# Patient Record
Sex: MALE | Race: WHITE | Employment: FULL TIME | URBAN - NONMETROPOLITAN AREA
[De-identification: names, ages, dates, MRNs, and addresses within clinical notes are randomized per-mention and may not be internally consistent; named-entity substitution may affect disease eponyms.]

---

## 2017-03-29 ENCOUNTER — OFFICE VISIT (OUTPATIENT)
Dept: PRIMARY CARE CLINIC | Age: 42
End: 2017-03-29
Payer: COMMERCIAL

## 2017-03-29 VITALS
TEMPERATURE: 98.3 F | DIASTOLIC BLOOD PRESSURE: 96 MMHG | SYSTOLIC BLOOD PRESSURE: 152 MMHG | HEIGHT: 72 IN | BODY MASS INDEX: 42.66 KG/M2 | OXYGEN SATURATION: 98 % | WEIGHT: 315 LBS | HEART RATE: 77 BPM

## 2017-03-29 DIAGNOSIS — E66.01 MORBID OBESITY DUE TO EXCESS CALORIES (HCC): ICD-10-CM

## 2017-03-29 DIAGNOSIS — Z00.00 WELL ADULT EXAM: Primary | ICD-10-CM

## 2017-03-29 DIAGNOSIS — H10.11 ALLERGIC CONJUNCTIVITIS, RIGHT: ICD-10-CM

## 2017-03-29 DIAGNOSIS — I10 ESSENTIAL HYPERTENSION: ICD-10-CM

## 2017-03-29 DIAGNOSIS — M17.0 PRIMARY OSTEOARTHRITIS OF BOTH KNEES: ICD-10-CM

## 2017-03-29 DIAGNOSIS — G47.33 OSA (OBSTRUCTIVE SLEEP APNEA): ICD-10-CM

## 2017-03-29 PROCEDURE — 99396 PREV VISIT EST AGE 40-64: CPT | Performed by: NURSE PRACTITIONER

## 2017-03-29 RX ORDER — DICLOFENAC SODIUM 75 MG/1
TABLET, DELAYED RELEASE ORAL
Qty: 60 TABLET | Refills: 3 | Status: SHIPPED | OUTPATIENT
Start: 2017-03-29 | End: 2018-10-11 | Stop reason: SDUPTHER

## 2017-03-29 RX ORDER — KETOTIFEN FUMARATE 0.35 MG/ML
1 SOLUTION/ DROPS OPHTHALMIC 2 TIMES DAILY
Qty: 1 ML | Refills: 0 | Status: SHIPPED | OUTPATIENT
Start: 2017-03-29 | End: 2017-04-08

## 2017-03-29 RX ORDER — LISINOPRIL 20 MG/1
20 TABLET ORAL DAILY
Qty: 90 TABLET | Refills: 3 | Status: SHIPPED | OUTPATIENT
Start: 2017-03-29 | End: 2018-03-21 | Stop reason: SDUPTHER

## 2017-03-29 ASSESSMENT — ENCOUNTER SYMPTOMS
NAUSEA: 0
DIARRHEA: 0
SHORTNESS OF BREATH: 0
EYE DISCHARGE: 1
VOMITING: 0
CONSTIPATION: 0
ABDOMINAL PAIN: 0
COUGH: 0
SORE THROAT: 0
EYE REDNESS: 1
RHINORRHEA: 0
TROUBLE SWALLOWING: 0

## 2018-03-21 DIAGNOSIS — I10 ESSENTIAL HYPERTENSION: ICD-10-CM

## 2018-03-21 RX ORDER — LISINOPRIL 20 MG/1
20 TABLET ORAL DAILY
Qty: 30 TABLET | Refills: 0 | Status: SHIPPED | OUTPATIENT
Start: 2018-03-21 | End: 2018-04-03 | Stop reason: SDUPTHER

## 2018-04-03 ENCOUNTER — OFFICE VISIT (OUTPATIENT)
Dept: PRIMARY CARE CLINIC | Age: 43
End: 2018-04-03
Payer: COMMERCIAL

## 2018-04-03 VITALS
WEIGHT: 315 LBS | SYSTOLIC BLOOD PRESSURE: 162 MMHG | BODY MASS INDEX: 42.66 KG/M2 | HEART RATE: 73 BPM | TEMPERATURE: 97 F | HEIGHT: 72 IN | OXYGEN SATURATION: 97 % | DIASTOLIC BLOOD PRESSURE: 91 MMHG

## 2018-04-03 DIAGNOSIS — Z00.00 ROUTINE PHYSICAL EXAMINATION: Primary | ICD-10-CM

## 2018-04-03 DIAGNOSIS — I10 ESSENTIAL HYPERTENSION: ICD-10-CM

## 2018-04-03 PROCEDURE — 99396 PREV VISIT EST AGE 40-64: CPT | Performed by: NURSE PRACTITIONER

## 2018-04-03 RX ORDER — LISINOPRIL 20 MG/1
20 TABLET ORAL DAILY
Qty: 30 TABLET | Refills: 11 | Status: SHIPPED | OUTPATIENT
Start: 2018-04-03 | End: 2019-04-10 | Stop reason: SDUPTHER

## 2018-04-03 RX ORDER — INFLUENZA A VIRUS A/SINGAPORE/GP1908/2015 IVR-180A (H1N1) ANTIGEN (PROPIOLACTONE INACTIVATED), INFLUENZA A VIRUS A/SINGAPORE/INFIMH-16-0019/2016 IVR-186 (H3N2) ANTIGEN (PROPIOLACTONE INACTIVATED) AND INFLUENZA B VIRUS B/MARYLAND/15/2016 ANTIGEN (PROPIOLACTONE INACTIVATED) 15; 15; 15 UG/.5ML; UG/.5ML; UG/.5ML
INJECTION, SUSPENSION INTRAMUSCULAR
COMMUNITY
Start: 2018-01-30 | End: 2018-04-03 | Stop reason: ALTCHOICE

## 2018-04-03 ASSESSMENT — ENCOUNTER SYMPTOMS
CONSTIPATION: 0
DIARRHEA: 0
SHORTNESS OF BREATH: 0
COUGH: 0
ABDOMINAL PAIN: 0
VOMITING: 0
NAUSEA: 0
SORE THROAT: 0
RHINORRHEA: 0
TROUBLE SWALLOWING: 0

## 2018-06-19 ENCOUNTER — TELEPHONE (OUTPATIENT)
Dept: PRIMARY CARE CLINIC | Age: 43
End: 2018-06-19

## 2018-06-19 DIAGNOSIS — Z00.00 ROUTINE GENERAL MEDICAL EXAMINATION AT A HEALTH CARE FACILITY: Primary | ICD-10-CM

## 2018-06-19 DIAGNOSIS — I10 ESSENTIAL HYPERTENSION: ICD-10-CM

## 2018-06-28 ENCOUNTER — OFFICE VISIT (OUTPATIENT)
Dept: PRIMARY CARE CLINIC | Age: 43
End: 2018-06-28
Payer: COMMERCIAL

## 2018-06-28 VITALS
WEIGHT: 315 LBS | TEMPERATURE: 98.1 F | SYSTOLIC BLOOD PRESSURE: 132 MMHG | HEIGHT: 72 IN | OXYGEN SATURATION: 96 % | DIASTOLIC BLOOD PRESSURE: 91 MMHG | BODY MASS INDEX: 42.66 KG/M2 | HEART RATE: 76 BPM

## 2018-06-28 DIAGNOSIS — Q82.8 ACCESSORY SKIN TAGS: ICD-10-CM

## 2018-06-28 DIAGNOSIS — I10 ESSENTIAL HYPERTENSION: Primary | ICD-10-CM

## 2018-06-28 PROCEDURE — 99214 OFFICE O/P EST MOD 30 MIN: CPT | Performed by: NURSE PRACTITIONER

## 2018-06-28 PROCEDURE — 11200 RMVL SKIN TAGS UP TO&INC 15: CPT | Performed by: NURSE PRACTITIONER

## 2018-06-28 ASSESSMENT — ENCOUNTER SYMPTOMS
RHINORRHEA: 0
ABDOMINAL PAIN: 0
CONSTIPATION: 0
NAUSEA: 0
TROUBLE SWALLOWING: 0
COUGH: 0
VOMITING: 0
SORE THROAT: 0
DIARRHEA: 0
SHORTNESS OF BREATH: 0

## 2018-06-28 ASSESSMENT — PATIENT HEALTH QUESTIONNAIRE - PHQ9
SUM OF ALL RESPONSES TO PHQ9 QUESTIONS 1 & 2: 0
2. FEELING DOWN, DEPRESSED OR HOPELESS: 0
SUM OF ALL RESPONSES TO PHQ QUESTIONS 1-9: 0
1. LITTLE INTEREST OR PLEASURE IN DOING THINGS: 0

## 2018-08-24 ENCOUNTER — TELEPHONE (OUTPATIENT)
Dept: PRIMARY CARE CLINIC | Age: 43
End: 2018-08-24

## 2018-08-24 DIAGNOSIS — Z00.00 ROUTINE GENERAL MEDICAL EXAMINATION AT A HEALTH CARE FACILITY: ICD-10-CM

## 2018-08-24 DIAGNOSIS — I10 ESSENTIAL HYPERTENSION: ICD-10-CM

## 2018-08-24 LAB
ALBUMIN SERPL-MCNC: 4.2 G/DL (ref 3.5–5.2)
ALP BLD-CCNC: 78 U/L (ref 40–130)
ALT SERPL-CCNC: 29 U/L (ref 5–41)
ANION GAP SERPL CALCULATED.3IONS-SCNC: 16 MMOL/L (ref 7–19)
AST SERPL-CCNC: 19 U/L (ref 5–40)
BASOPHILS ABSOLUTE: 0.1 K/UL (ref 0–0.2)
BASOPHILS RELATIVE PERCENT: 0.7 % (ref 0–1)
BILIRUB SERPL-MCNC: 0.4 MG/DL (ref 0.2–1.2)
BUN BLDV-MCNC: 14 MG/DL (ref 6–20)
CALCIUM SERPL-MCNC: 9.2 MG/DL (ref 8.6–10)
CHLORIDE BLD-SCNC: 101 MMOL/L (ref 98–111)
CHOLESTEROL, TOTAL: 129 MG/DL (ref 160–199)
CO2: 25 MMOL/L (ref 22–29)
CREAT SERPL-MCNC: 0.9 MG/DL (ref 0.5–1.2)
CREATININE URINE: 263.9 MG/DL (ref 4.2–622)
EOSINOPHILS ABSOLUTE: 0.4 K/UL (ref 0–0.6)
EOSINOPHILS RELATIVE PERCENT: 4.2 % (ref 0–5)
GFR NON-AFRICAN AMERICAN: >60
GLUCOSE BLD-MCNC: 112 MG/DL (ref 74–109)
HCT VFR BLD CALC: 45.4 % (ref 42–52)
HDLC SERPL-MCNC: 36 MG/DL (ref 55–121)
HEMOGLOBIN: 14.4 G/DL (ref 14–18)
LDL CHOLESTEROL CALCULATED: 66 MG/DL
LYMPHOCYTES ABSOLUTE: 1.6 K/UL (ref 1.1–4.5)
LYMPHOCYTES RELATIVE PERCENT: 17.6 % (ref 20–40)
MCH RBC QN AUTO: 27.6 PG (ref 27–31)
MCHC RBC AUTO-ENTMCNC: 31.7 G/DL (ref 33–37)
MCV RBC AUTO: 87 FL (ref 80–94)
MICROALBUMIN UR-MCNC: <1.2 MG/DL (ref 0–19)
MICROALBUMIN/CREAT UR-RTO: NORMAL MG/G
MONOCYTES ABSOLUTE: 0.7 K/UL (ref 0–0.9)
MONOCYTES RELATIVE PERCENT: 8.2 % (ref 0–10)
NEUTROPHILS ABSOLUTE: 6.2 K/UL (ref 1.5–7.5)
NEUTROPHILS RELATIVE PERCENT: 69 % (ref 50–65)
PDW BLD-RTO: 14.2 % (ref 11.5–14.5)
PLATELET # BLD: 236 K/UL (ref 130–400)
PMV BLD AUTO: 11.9 FL (ref 9.4–12.4)
POTASSIUM SERPL-SCNC: 4.2 MMOL/L (ref 3.5–5)
RBC # BLD: 5.22 M/UL (ref 4.7–6.1)
SODIUM BLD-SCNC: 142 MMOL/L (ref 136–145)
T4 FREE: 1.2 NG/DL (ref 0.9–1.7)
TOTAL PROTEIN: 7.4 G/DL (ref 6.6–8.7)
TRIGL SERPL-MCNC: 134 MG/DL (ref 0–149)
TSH SERPL DL<=0.05 MIU/L-ACNC: 1.53 UIU/ML (ref 0.27–4.2)
WBC # BLD: 9 K/UL (ref 4.8–10.8)

## 2018-08-27 ENCOUNTER — OFFICE VISIT (OUTPATIENT)
Dept: PRIMARY CARE CLINIC | Age: 43
End: 2018-08-27
Payer: COMMERCIAL

## 2018-08-27 VITALS
DIASTOLIC BLOOD PRESSURE: 81 MMHG | HEIGHT: 72 IN | TEMPERATURE: 98.7 F | HEART RATE: 84 BPM | SYSTOLIC BLOOD PRESSURE: 131 MMHG | BODY MASS INDEX: 42.66 KG/M2 | WEIGHT: 315 LBS | OXYGEN SATURATION: 97 %

## 2018-08-27 DIAGNOSIS — G47.33 OSA (OBSTRUCTIVE SLEEP APNEA): ICD-10-CM

## 2018-08-27 DIAGNOSIS — E66.01 CLASS 3 SEVERE OBESITY DUE TO EXCESS CALORIES WITHOUT SERIOUS COMORBIDITY WITH BODY MASS INDEX (BMI) OF 50.0 TO 59.9 IN ADULT (HCC): Primary | ICD-10-CM

## 2018-08-27 DIAGNOSIS — I10 ESSENTIAL HYPERTENSION: ICD-10-CM

## 2018-08-27 PROBLEM — E66.813 CLASS 3 SEVERE OBESITY DUE TO EXCESS CALORIES WITHOUT SERIOUS COMORBIDITY WITH BODY MASS INDEX (BMI) OF 50.0 TO 59.9 IN ADULT: Status: ACTIVE | Noted: 2018-08-27

## 2018-08-27 PROCEDURE — 99214 OFFICE O/P EST MOD 30 MIN: CPT | Performed by: NURSE PRACTITIONER

## 2018-08-27 ASSESSMENT — ENCOUNTER SYMPTOMS
RHINORRHEA: 0
ABDOMINAL PAIN: 0
VOMITING: 0
SHORTNESS OF BREATH: 0
DIARRHEA: 0
SORE THROAT: 0
TROUBLE SWALLOWING: 0
CONSTIPATION: 0
COUGH: 0
NAUSEA: 0

## 2018-08-27 NOTE — PROGRESS NOTES
injections daily 100 each 3    diclofenac (VOLTAREN) 75 MG EC tablet TAKE ONE TABLET BY MOUTH TWICE DAILY 60 tablet 3     No current facility-administered medications for this visit. Allergies   Allergen Reactions    Latex Swelling       Health Maintenance   Topic Date Due    HIV screen  05/03/1989    DTaP/Tdap/Td vaccine (1 - Tdap) 05/03/1993    Diabetes screen  05/03/2014    Flu vaccine (1) 09/01/2018    Potassium monitoring  08/24/2019    Creatinine monitoring  08/24/2019    Lipid screen  08/24/2023        Subjective:      Review of Systems   Constitutional: Negative for activity change, appetite change, fatigue, fever and unexpected weight change. HENT: Negative for ear pain, rhinorrhea, sore throat and trouble swallowing. Eyes: Negative for visual disturbance. Respiratory: Negative for cough and shortness of breath. Cardiovascular: Negative for chest pain, palpitations and leg swelling. Gastrointestinal: Negative for abdominal pain, constipation, diarrhea, nausea and vomiting. Genitourinary: Negative for flank pain. Musculoskeletal: Negative for arthralgias, myalgias, neck pain and neck stiffness. Neurological: Negative for headaches. Psychiatric/Behavioral: Negative for decreased concentration and sleep disturbance. The patient is not nervous/anxious. Objective:     Physical Exam   Constitutional: He is oriented to person, place, and time. He appears well-developed and well-nourished. HENT:   Head: Normocephalic and atraumatic. Eyes: No scleral icterus. Neck: Normal range of motion. Neck supple. No edema present. Cardiovascular: Normal rate, regular rhythm, normal heart sounds and intact distal pulses. No murmur heard. Pulmonary/Chest: Effort normal and breath sounds normal.   Abdominal: Soft. Normal appearance and bowel sounds are normal. He exhibits no distension. There is no hepatosplenomegaly. There is no tenderness. There is no rebound.

## 2018-10-11 DIAGNOSIS — M17.0 PRIMARY OSTEOARTHRITIS OF BOTH KNEES: ICD-10-CM

## 2018-10-11 RX ORDER — DICLOFENAC SODIUM 75 MG/1
TABLET, DELAYED RELEASE ORAL
Qty: 60 TABLET | Refills: 5 | Status: SHIPPED | OUTPATIENT
Start: 2018-10-11 | End: 2019-04-10 | Stop reason: SDUPTHER

## 2018-10-11 NOTE — TELEPHONE ENCOUNTER
From: Robyn Pham  Sent: 10/11/2018 8:59 AM CDT  Subject: Medication Renewal Request    Clifford Gypsy.  Ankit would like a refill of the following medications:     diclofenac (VOLTAREN) 75 MG EC tablet [Abelino Wolfe, APRN]    Preferred pharmacy: [facility] [address], [address] [phone] - F [phone]    Comment:

## 2019-04-10 DIAGNOSIS — I10 ESSENTIAL HYPERTENSION: ICD-10-CM

## 2019-04-10 DIAGNOSIS — M17.0 PRIMARY OSTEOARTHRITIS OF BOTH KNEES: ICD-10-CM

## 2019-04-10 RX ORDER — LISINOPRIL 20 MG/1
20 TABLET ORAL DAILY
Qty: 30 TABLET | Refills: 11 | Status: SHIPPED | OUTPATIENT
Start: 2019-04-10

## 2019-04-10 RX ORDER — DICLOFENAC SODIUM 75 MG/1
TABLET, DELAYED RELEASE ORAL
Qty: 60 TABLET | Refills: 5 | Status: SHIPPED | OUTPATIENT
Start: 2019-04-10 | End: 2020-10-05

## 2019-04-13 DIAGNOSIS — I10 ESSENTIAL HYPERTENSION: ICD-10-CM

## 2019-04-15 RX ORDER — LISINOPRIL 20 MG/1
20 TABLET ORAL DAILY
Qty: 30 TABLET | Refills: 11 | OUTPATIENT
Start: 2019-04-15

## 2020-01-02 ENCOUNTER — OFFICE VISIT (OUTPATIENT)
Dept: BARIATRICS/WEIGHT MGMT | Facility: CLINIC | Age: 45
End: 2020-01-02

## 2020-01-02 VITALS
DIASTOLIC BLOOD PRESSURE: 84 MMHG | TEMPERATURE: 98.2 F | SYSTOLIC BLOOD PRESSURE: 143 MMHG | WEIGHT: 315 LBS | HEART RATE: 64 BPM | BODY MASS INDEX: 42.66 KG/M2 | HEIGHT: 72 IN | OXYGEN SATURATION: 98 %

## 2020-01-02 DIAGNOSIS — G47.33 OBSTRUCTIVE SLEEP APNEA SYNDROME: ICD-10-CM

## 2020-01-02 DIAGNOSIS — I10 ESSENTIAL HYPERTENSION: ICD-10-CM

## 2020-01-02 DIAGNOSIS — E66.2 CLASS 3 OBESITY WITH ALVEOLAR HYPOVENTILATION, SERIOUS COMORBIDITY, AND BODY MASS INDEX (BMI) OF 50.0 TO 59.9 IN ADULT (HCC): Primary | ICD-10-CM

## 2020-01-02 PROBLEM — E66.813 CLASS 3 OBESITY WITH ALVEOLAR HYPOVENTILATION, SERIOUS COMORBIDITY, AND BODY MASS INDEX (BMI) OF 50.0 TO 59.9 IN ADULT: Status: ACTIVE | Noted: 2020-01-02

## 2020-01-02 PROBLEM — G47.30 SLEEP APNEA: Status: ACTIVE | Noted: 2020-01-02

## 2020-01-02 PROCEDURE — 99204 OFFICE O/P NEW MOD 45 MIN: CPT | Performed by: SURGERY

## 2020-01-02 RX ORDER — LISINOPRIL 20 MG/1
20 TABLET ORAL DAILY
Status: ON HOLD | COMMUNITY

## 2020-01-02 RX ORDER — DICLOFENAC SODIUM AND MISOPROSTOL 75; 200 MG/1; UG/1
1 TABLET, DELAYED RELEASE ORAL 2 TIMES DAILY
COMMUNITY
End: 2022-12-19 | Stop reason: DRUGHIGH

## 2020-01-02 NOTE — PROGRESS NOTES
Patient Care Team:  Angelo Mckinnon APRN as PCP - General (Family Medicine)    Reason for Visit:  Surgical Weight loss    Subjective     Patient is a 44 y.o. male presents with morbid obesity and his Body mass index is 57.64 kg/m².     He is here for discussion of surgical weight loss options.  He stated he has been with the disease of obesity for year(s).  He stated he suffers from sleep apnea, hypertension and morbid obesity due to his weight gain.  He stated that weight loss helps alleviate these symptoms.   He stated that he has tried medications as well as the Atkins, low-carb diets, high-protein diets and self applied dietary regimen to help with weight loss.  He stated that he has attempted these conservative methods for weight loss without maintaining long term success.  Today he would like to discuss surgical weight loss options such as the Laparoscopic Sleeve Gastrectomy or the Laparoscopic R - Y Gastric Bypass.     Review of Systems  Negative except the below listed  General ROS: positive for  - fatigue and weight gain  ENT ROS: positive for - nasal discharge  Musculoskeletal ROS: positive for - joint pain    History  Past Medical History:   Diagnosis Date   • Hypertension    • Pain     back and joint   • Sleep apnea     c pap     Past Surgical History:   Procedure Laterality Date   • HIP SURGERY Left      Family History   Problem Relation Age of Onset   • Arthritis Mother    • Hypertension Mother    • Obesity Mother    • Arthritis Father    • Diabetes Father    • Hypertension Father    • Obesity Father    • Arthritis Brother    • Obesity Brother    • Arthritis Maternal Grandmother    • Hypertension Maternal Grandmother    • Cancer Maternal Grandfather    • Hypertension Maternal Grandfather    • Arthritis Paternal Grandmother    • Hypertension Paternal Grandmother    • Obesity Paternal Grandmother    • Hypertension Paternal Grandfather    • Stroke Paternal Grandfather    • Obesity Paternal  Grandfather      Social History     Tobacco Use   • Smoking status: Never Smoker   • Smokeless tobacco: Never Used   Substance Use Topics   • Alcohol use: Never     Frequency: Never   • Drug use: Not on file       (Not in a hospital admission)  Allergies:  Latex and Adhesive tape      Current Outpatient Medications:   •  diclofenac-miSOPROStol (ARTHROTEC 75) 75-0.2 MG EC tablet, Take 1 tablet by mouth 2 (Two) Times a Day., Disp: , Rfl:   •  lisinopril (PRINIVIL,ZESTRIL) 20 MG tablet, Take 20 mg by mouth Daily., Disp: , Rfl:     Objective     Vital Signs  Temp:  [98.2 °F (36.8 °C)] 98.2 °F (36.8 °C)  Heart Rate:  [64] 64  BP: (143)/(84) 143/84  Body mass index is 57.64 kg/m².      01/02/20  0910   Weight: (!) 193 kg (425 lb)       Physical Exam:      General Appearance alert, appears stated age and cooperative  Head normocephalic, without obvious abnormality and atraumatic  Lungs clear to auscultation, respirations regular, respirations even and respirations unlabored  Heart regular rhythm & normal rate, normal S1, S2, no murmur, no gallop, no rub and no click  Abdomen normal bowel sounds, no masses, no hepatomegaly, no splenomegaly, soft non-tender, no guarding, no rebound tenderness and Obese  Extremities moves extremities well, no edema, no cyanosis and no redness     Results Review:   None        Assessment/Plan   Encounter Diagnoses   Name Primary?   • Class 3 obesity with alveolar hypoventilation, serious comorbidity, and body mass index (BMI) of 50.0 to 59.9 in adult (CMS/HCC) Yes   • Essential hypertension    • Obstructive sleep apnea syndrome        I believe this patient will be a good candidate for weight loss surgery.    He has chosen laparoscopic sleeve gastrectomy. I agree with this decision.  I have discussed the Munira - Y Gastric Bypass, laparoscopic sleeve gastrectomy and the Laparoscopic Gastric Band procedures to provide the alternatives which also includes non surgical weight loss options as well.   "We discussed the benefits of the surgeries including the benefit of weight loss and the possible reversal of co-morbid conditions associated with morbid obesity. I explained to him that prior to making a definitive decision on the type of surgery he will require a study of the upper GI system.  I explained to him why the EGD is recommended.  He has been provided a structured dietary regimen based off of his behavior.  I discussed with the patient the etiology of the disease of obesity and the potential comorbid conditions associated with this disease.  He was instructed to follow the dietary regimen and follow-up with our program in 1 month's time with any additional questions as they may arise during this time.  We emphasized on focusing on proteins and meals high in fiber as well as adequate hydration that exceed 64 ounces of water daily.    I explained that I anticipate the patient to lose 8 pounds prior to his next monthly visit.  I have also explained that they need to record or document when they are going to have the \"cheat day\".  The patient reports that his hypertension and sleep apnea have remained stable on his current treatment regimen.  I anticipate improvement of these comorbid conditions with reversal/improvement of his morbid obesity.  I discussed the patient's findings and my recommendations with patient.     I have also recommended that he obtain completion of a medically supervised weight loss program prior to surgery consideration.    Dr. Solitario Luo MD FACS    01/02/20  9:57 AM  Patient Care Team:  Angelo Mckinnon APRN as PCP - General (Family Medicine)    "

## 2020-01-08 RX ORDER — DICLOFENAC SODIUM 75 MG/1
TABLET, DELAYED RELEASE ORAL
Qty: 60 TABLET | Refills: 0 | OUTPATIENT
Start: 2020-01-08

## 2020-01-08 NOTE — TELEPHONE ENCOUNTER
Received fax from pharmacy requesting refill on pts medication(s). Pt was last seen in office on 8/27/2018  and has a follow up scheduled for Visit date not found. Will send request to  Dr. Garrett Carney  for patient.      Requested Prescriptions     Pending Prescriptions Disp Refills    diclofenac (VOLTAREN) 75 MG EC tablet [Pharmacy Med Name: Diclofenac Sodium 75 MG Oral Tablet Delayed Release] 60 tablet 0     Sig: TAKE 1 TABLET BY MOUTH TWICE DAILY

## 2020-01-14 RX ORDER — DICLOFENAC SODIUM 75 MG/1
TABLET, DELAYED RELEASE ORAL
Qty: 60 TABLET | Refills: 5 | OUTPATIENT
Start: 2020-01-14

## 2020-01-14 NOTE — TELEPHONE ENCOUNTER
Received fax from pharmacy requesting refill on pts medication(s). Pt was last seen in office on 8/27/2018  and has a follow up scheduled for Visit date not found. Will send request to  Dr. Garrett Carney  for patient.      Requested Prescriptions     Pending Prescriptions Disp Refills    diclofenac (VOLTAREN) 75 MG EC tablet 60 tablet 5     Sig: TAKE ONE TABLET BY MOUTH TWICE DAILY

## 2020-02-24 ENCOUNTER — OFFICE VISIT (OUTPATIENT)
Dept: BARIATRICS/WEIGHT MGMT | Facility: CLINIC | Age: 45
End: 2020-02-24

## 2020-02-24 VITALS
DIASTOLIC BLOOD PRESSURE: 87 MMHG | TEMPERATURE: 98.7 F | WEIGHT: 315 LBS | BODY MASS INDEX: 42.66 KG/M2 | HEIGHT: 72 IN | HEART RATE: 74 BPM | SYSTOLIC BLOOD PRESSURE: 141 MMHG | OXYGEN SATURATION: 97 %

## 2020-02-24 DIAGNOSIS — I10 ESSENTIAL HYPERTENSION: ICD-10-CM

## 2020-02-24 DIAGNOSIS — G47.33 OBSTRUCTIVE SLEEP APNEA SYNDROME: ICD-10-CM

## 2020-02-24 DIAGNOSIS — E66.2 CLASS 3 OBESITY WITH ALVEOLAR HYPOVENTILATION, SERIOUS COMORBIDITY, AND BODY MASS INDEX (BMI) OF 50.0 TO 59.9 IN ADULT (HCC): Primary | ICD-10-CM

## 2020-02-24 PROCEDURE — 99214 OFFICE O/P EST MOD 30 MIN: CPT | Performed by: NURSE PRACTITIONER

## 2020-02-24 NOTE — PROGRESS NOTES
Patient Care Team:  Angelo Mckinnon APRN as PCP - General (Family Medicine)    Reason for Visit:  Medical Weight Loss    Subjective        Santiago Mcgraw is a 44 y.o. year old male who is here for follow-up and continued medical management of morbid obesity. At this time he would like to be MWL as he is unsure about pursuing surgical intervention but states he is keeping an open mind. His current Body mass index is 57.97 kg/m². He states he suffers from high blood pressure and morbid obesity due to weight gain. He is currently following the 4 meals/day diet prescription. Santiago Mcgraw previously agreed to incorporate the prescription as provided, while also increasing physical exercise. Patient states he has not been successful at following the provided dietary and behavior modifications as suggested due to working out of town. He does admit to drinking sodas daily. He has been exercising by walking rarely. Santiago Mcgraw also states he has been drinking 16 ounces of water and is unsure on grams of protein intake per day. He has gained 2 lbs of weight since his last visit.    Review of Systems  Negative except the below listed  General ROS: positive for  - sleep disturbance  Musculoskeletal ROS: positive for - pain in knee    History  Past Medical History:   Diagnosis Date   • Hypertension    • Pain     back and joint   • Sleep apnea     c pap     Past Surgical History:   Procedure Laterality Date   • HIP SURGERY Left      Family History   Problem Relation Age of Onset   • Arthritis Mother    • Hypertension Mother    • Obesity Mother    • Arthritis Father    • Diabetes Father    • Hypertension Father    • Obesity Father    • Arthritis Brother    • Obesity Brother    • Arthritis Maternal Grandmother    • Hypertension Maternal Grandmother    • Cancer Maternal Grandfather    • Hypertension Maternal Grandfather    • Arthritis Paternal Grandmother    • Hypertension Paternal Grandmother    • Obesity Paternal  Grandmother    • Hypertension Paternal Grandfather    • Stroke Paternal Grandfather    • Obesity Paternal Grandfather      Social History     Tobacco Use   • Smoking status: Never Smoker   • Smokeless tobacco: Never Used   Substance Use Topics   • Alcohol use: Never     Frequency: Never   • Drug use: Not on file       (Not in a hospital admission)  Allergies:  Latex and Adhesive tape      Current Outpatient Medications:   •  diclofenac-miSOPROStol (ARTHROTEC 75) 75-0.2 MG EC tablet, Take 1 tablet by mouth 2 (Two) Times a Day., Disp: , Rfl:   •  lisinopril (PRINIVIL,ZESTRIL) 20 MG tablet, Take 20 mg by mouth Daily., Disp: , Rfl:     Objective     Vital Signs  Temp:  [98.7 °F (37.1 °C)] 98.7 °F (37.1 °C)  Heart Rate:  [74] 74  BP: (141)/(87) 141/87  Body mass index is 57.97 kg/m².      02/24/20  1518   Weight: (!) 194 kg (427 lb 6.4 oz)       Physical Exam:  HEENT: extra ocular movement intact  Respiratory:appears well, vitals normal, no respiratory distress, acyanotic, normal RR, chest clear, no wheezing, crepitations, rhonchi, normal symmetric air entry  Cardiovascular: regular rate and rhythm  GI: Soft, non-tender, normal bowel sounds; no bruits, organomegaly or masses. Abnormal shape: Obese  Musculoskeletal: inspection - no abnormality, range of motion normal  Neurologic: alert, oriented, normal speech, no focal findings or movement disorder noted       Results Review:   None        Assessment/Plan   Encounter Diagnoses   Name Primary?   • Class 3 obesity with alveolar hypoventilation, serious comorbidity, and body mass index (BMI) of 50.0 to 59.9 in adult (CMS/[facility]) Yes   • Essential hypertension    • Obstructive sleep apnea syndrome          1. Santiago Mcgraw was seen today for follow-up, obesity, nutrition counseling and weight loss. He has gained 2 lbs of weight since his last visit, making his Body mass index is 57.97 kg/m².. Today we discussed consistency with healthy changes in lifestyle, diet, and exercise for  long term success. Santiago Mcgraw had received handouts to him explaining the recommendation on portion sizes/appetite control/reading nutrition labels. Intensive behavioral therapy for obesity was done today as well.    Goals for this month are: follow the meal prescription as provided focusing on eating 4 meals/day with vegetables at every meal, eliminating carbohydrates after lunch, and getting adequate water and protein intake. Patient encouraged to call with questions and/or struggles as they may arise prior to next scheduled appointment.    2. Current comorbid conditions of hypertension and PAPITO associated with his morbid obesity are reported to be stable on his current treatment regimen and medications. We anticipate the comorbid conditions to improve as we address his morbid obesity.    Follow up in 1 month for a weight recheck.    HILDA Tapia    02/24/20  3:34 PM  Patient Care Team:  Angelo Mckinnon APRN as PCP - General (Family Medicine)

## 2020-03-30 ENCOUNTER — TELEPHONE (OUTPATIENT)
Dept: BARIATRICS/WEIGHT MGMT | Facility: CLINIC | Age: 45
End: 2020-03-30

## 2020-03-30 NOTE — TELEPHONE ENCOUNTER
LVM for the patient call me back regarding his appointment for tomorrow         Santiago returned my call and we review the steps for a [employer] video appt.

## 2020-03-31 ENCOUNTER — TELEPHONE (OUTPATIENT)
Dept: BARIATRICS/WEIGHT MGMT | Facility: CLINIC | Age: 45
End: 2020-03-31

## 2020-04-27 ENCOUNTER — TELEMEDICINE (OUTPATIENT)
Dept: BARIATRICS/WEIGHT MGMT | Facility: CLINIC | Age: 45
End: 2020-04-27

## 2020-04-27 VITALS — HEIGHT: 72 IN | WEIGHT: 315 LBS | BODY MASS INDEX: 42.66 KG/M2

## 2020-04-27 DIAGNOSIS — G47.33 OBSTRUCTIVE SLEEP APNEA SYNDROME: ICD-10-CM

## 2020-04-27 DIAGNOSIS — I10 ESSENTIAL HYPERTENSION: ICD-10-CM

## 2020-04-27 DIAGNOSIS — E66.2 CLASS 3 OBESITY WITH ALVEOLAR HYPOVENTILATION, SERIOUS COMORBIDITY, AND BODY MASS INDEX (BMI) OF 50.0 TO 59.9 IN ADULT (HCC): Primary | ICD-10-CM

## 2020-04-27 PROBLEM — E66.813 CLASS 3 OBESITY WITH ALVEOLAR HYPOVENTILATION, SERIOUS COMORBIDITY, AND BODY MASS INDEX (BMI) OF 50.0 TO 59.9 IN ADULT: Status: RESOLVED | Noted: 2020-01-02 | Resolved: 2020-04-27

## 2020-04-27 PROCEDURE — 99422 OL DIG E/M SVC 11-20 MIN: CPT | Performed by: NURSE PRACTITIONER

## 2020-04-27 NOTE — PROGRESS NOTES
Patient Care Team:  Angelo Mckinnon APRN as PCP - General (Family Medicine)    Reason for Visit:   Surgical Weight Loss    Type of Visit: Video Visit  Provider Location: [facility]  Patient Location: Home    Subjective        Santiago Mcgraw is a 44 y.o. year old male who is attending a video visit for follow-up and continued medical management of morbid obesity. His current Body mass index is 55.61 kg/m². He states he suffers from high blood pressure, sleep apnea, and morbid obesity due to weight gain. He is currently following the 4 meals/day diet prescription. Santiago Mcgraw previously agreed to incorporate the prescription as provided, while also increasing physical exercise. Patient states he has not been successful at following the provided dietary and behavior modifications as suggested due to the Covid 19 pandemic. She does state he has been successful at eating 4 meals/day and limiting carbohydrates after lunch but has struggled with drinking sodas and eating vegetables with every meal. He has not been exercising. Santiago Mcgraw also states he has been drinking 64 ounces of water and is unsure on grams of protein intake per day. He has lost 4 lbs of weight since his last visit.     Review of Systems  Negative except the below listed  Musculoskeletal ROS: positive for - joint pain    History  Past Medical History:   Diagnosis Date   • Hypertension    • Pain     back and joint   • Sleep apnea     c pap     Past Surgical History:   Procedure Laterality Date   • HIP SURGERY Left      Family History   Problem Relation Age of Onset   • Arthritis Mother    • Hypertension Mother    • Obesity Mother    • Arthritis Father    • Diabetes Father    • Hypertension Father    • Obesity Father    • Arthritis Brother    • Obesity Brother    • Arthritis Maternal Grandmother    • Hypertension Maternal Grandmother    • Cancer Maternal Grandfather    • Hypertension Maternal Grandfather    • Arthritis Paternal  Grandmother    • Hypertension Paternal Grandmother    • Obesity Paternal Grandmother    • Hypertension Paternal Grandfather    • Stroke Paternal Grandfather    • Obesity Paternal Grandfather      Social History     Tobacco Use   • Smoking status: Never Smoker   • Smokeless tobacco: Never Used   Substance Use Topics   • Alcohol use: Never     Frequency: Never   • Drug use: Not on file       (Not in a hospital admission)  Allergies:  Latex and Adhesive tape      Current Outpatient Medications:   •  diclofenac-miSOPROStol (ARTHROTEC 75) 75-0.2 MG EC tablet, Take 1 tablet by mouth 2 (Two) Times a Day., Disp: , Rfl:   •  lisinopril (PRINIVIL,ZESTRIL) 20 MG tablet, Take 20 mg by mouth Daily., Disp: , Rfl:     Objective     Vital Signs     Body mass index is 55.61 kg/m².      04/27/20  0907   Weight: (!) 186 kg (410 lb)       Physical Exam:  HEENT: extra ocular movement intact  Respiratory:appears well, no respiratory distress, acyanotic, normal RR  GI: Abnormal shape: obese  Musculoskeletal: range of motion normal  Neurologic: alert, oriented, normal speech, no focal findings or movement disorder noted       Results Review:   None        Assessment/Plan   Encounter Diagnoses   Name Primary?   • Class 3 obesity with alveolar hypoventilation, serious comorbidity, and body mass index (BMI) of 50.0 to 59.9 in adult (CMS/HCC) Yes   • Essential hypertension    • Obstructive sleep apnea syndrome          1. Santiago Mcgraw was seen today via video visit for follow-up, obesity, nutrition counseling and weight loss. He has lost 4 lbs of weight since his last visit, making his Body mass index is 55.61 kg/m².. Today we discussed consistency with healthy changes in lifestyle, diet, and exercise for long term success. Santiago Mcgraw had received handouts to him explaining the recommendation on portion sizes/appetite control/reading nutrition labels. Intensive behavioral therapy for obesity was done today as well.    Goals for this month are:  return to following the meal prescription as provided focusing on eating 4 meals/day with vegetables at every meal, eliminating carbohydrates after lunch, and getting adequate water and protein intake. Patient encouraged to call with questions and/or struggles as they may arise prior to next scheduled appointment.    2. Current comorbid conditions of hypertension and PAPITO associated with his morbid obesity are reported to be stable on his current treatment regimen and medications. We anticipate the comorbid conditions to improve as we address his morbid obesity.    A total of 15 minutes was spent face to face with this patient on video and over half of the time was spent on counseling and coordination of care for the disease of obesity. We specifically reviewed the dietary prescription and I made recommendations toward increasing exercise as tolerated as well as focusing on training their behavior toward storing less.    Follow up in 1 month for a weight recheck.    HILDA Tapia    04/27/20  09:28  Patient Care Team:  Angelo Mckinnon APRN as PCP - General (Family Medicine)

## 2020-04-28 ENCOUNTER — TELEPHONE (OUTPATIENT)
Dept: NUTRITION | Facility: HOSPITAL | Age: 45
End: 2020-04-28

## 2020-04-28 NOTE — PROGRESS NOTES
Nutrition Services    Patient Name:  Santiago Mcgraw  YOB: 1975  MRN: [id number]  Admit Date:  (Not on file)    Left message.    Electronically signed by:  Cortney Jane  04/28/20 11:50

## 2020-05-01 ENCOUNTER — TELEPHONE (OUTPATIENT)
Dept: BARIATRICS/WEIGHT MGMT | Facility: CLINIC | Age: 45
End: 2020-05-01

## 2020-05-27 ENCOUNTER — TELEPHONE (OUTPATIENT)
Dept: BARIATRICS/WEIGHT MGMT | Facility: CLINIC | Age: 45
End: 2020-05-27

## 2020-05-28 ENCOUNTER — TELEMEDICINE (OUTPATIENT)
Dept: BARIATRICS/WEIGHT MGMT | Facility: CLINIC | Age: 45
End: 2020-05-28

## 2020-05-28 VITALS — WEIGHT: 315 LBS | BODY MASS INDEX: 42.66 KG/M2 | HEIGHT: 72 IN

## 2020-05-28 DIAGNOSIS — G47.33 OBSTRUCTIVE SLEEP APNEA SYNDROME: ICD-10-CM

## 2020-05-28 DIAGNOSIS — E66.2 CLASS 3 OBESITY WITH ALVEOLAR HYPOVENTILATION, SERIOUS COMORBIDITY, AND BODY MASS INDEX (BMI) OF 50.0 TO 59.9 IN ADULT (HCC): Primary | ICD-10-CM

## 2020-05-28 DIAGNOSIS — I10 ESSENTIAL HYPERTENSION: ICD-10-CM

## 2020-05-28 PROCEDURE — 99422 OL DIG E/M SVC 11-20 MIN: CPT | Performed by: NURSE PRACTITIONER

## 2020-05-28 NOTE — PROGRESS NOTES
Patient Care Team:  Angelo Mckinnon APRN as PCP - General (Family Medicine)    Reason for Visit: Medical vs Surgical Weight Loss (Visit 4)    Type of Visit: Video Visit  Provider Location: [facility] [place] [facility]  Patient Location: At work in a private location    Subjective        Santiago Mcgraw is a 45 y.o. year old male who is attending a video visit for follow-up and continued medical management of morbid obesity. His current Body mass index is 56.28 kg/m². He states he suffers from high blood pressure, sleep apnea, and morbid obesity due to weight gain. He is currently following the 4 meals/day diet prescription. Santiago Mcgraw previously agreed to incorporate the prescription as provided, while also increasing physical exercise. Patient states he has not been successful at following the provided dietary and behavior modifications as suggested due staying at a hotel due work. He does state he has been successful at eating 3-4 meals/day and has been trying to increase vegetable intake but has struggled with drinking sodas. He has not been exercising. Santiago Mcgraw also states he has been drinking 64 ounces of water and is unsure on grams of protein intake per day. He has gained 5 lbs of weight since his last visit.     Review of Systems  Negative except the below listed  Musculoskeletal ROS: positive for - joint pain    History  Past Medical History:   Diagnosis Date   • Hypertension    • Pain     back and joint   • Sleep apnea     c pap     Past Surgical History:   Procedure Laterality Date   • HIP SURGERY Left      Family History   Problem Relation Age of Onset   • Arthritis Mother    • Hypertension Mother    • Obesity Mother    • Arthritis Father    • Diabetes Father    • Hypertension Father    • Obesity Father    • Arthritis Brother    • Obesity Brother    • Arthritis Maternal Grandmother    • Hypertension Maternal Grandmother    • Cancer Maternal Grandfather    • Hypertension Maternal Grandfather    •  Arthritis Paternal Grandmother    • Hypertension Paternal Grandmother    • Obesity Paternal Grandmother    • Hypertension Paternal Grandfather    • Stroke Paternal Grandfather    • Obesity Paternal Grandfather      Social History     Tobacco Use   • Smoking status: Never Smoker   • Smokeless tobacco: Never Used   Substance Use Topics   • Alcohol use: Never     Frequency: Never   • Drug use: Not on file       (Not in a hospital admission)  Allergies:  Latex and Adhesive tape      Current Outpatient Medications:   •  diclofenac-miSOPROStol (ARTHROTEC 75) 75-0.2 MG EC tablet, Take 1 tablet by mouth 2 (Two) Times a Day., Disp: , Rfl:   •  lisinopril (PRINIVIL,ZESTRIL) 20 MG tablet, Take 20 mg by mouth Daily., Disp: , Rfl:     Objective     Vital Signs     Body mass index is 56.28 kg/m².      05/28/20  1126   Weight: (!) 188 kg (415 lb)       Physical Exam   Constitutional: He appears well-developed and well-nourished.   HENT:   Head: Normocephalic and atraumatic.   Eyes: Pupils are equal, round, and reactive to light. EOM are normal.   Neck: Neck normal appearance.  Pulmonary/Chest: Effort normal.   Abdominal:   Obese   Musculoskeletal: Normal range of motion.   Neurological: He is alert.   Skin: Skin is dry.   Psychiatric: He has a normal mood and affect.          Results Review:   None      Assessment/Plan   Encounter Diagnoses   Name Primary?   • Class 3 obesity with alveolar hypoventilation, serious comorbidity, and body mass index (BMI) of 50.0 to 59.9 in adult (CMS/[facility]) Yes   • Essential hypertension    • Obstructive sleep apnea syndrome          1. Santiago Mcgraw was seen today via video visit for follow-up, obesity, nutrition counseling and weight loss. He has lost 5 lbs of weight since his last visit, making his Body mass index is 56.28 kg/m².. Today we discussed consistency with healthy changes in lifestyle, diet, and exercise for long term success. Santiago Mcgraw had received handouts to him explaining the  recommendation on portion sizes/appetite control/reading nutrition labels. Intensive behavioral therapy for obesity was done today as well.    Goals for this month are: return to following the meal prescription as provided focusing on eating 4 meals/day with vegetables at every meal, eliminating carbohydrates after lunch, and getting adequate water and protein intake. Patient encouraged to call with questions and/or struggles as they may arise prior to next scheduled appointment.    2. Current comorbid conditions of hypertension and PAPITO associated with his morbid obesity are reported to be stable on his current treatment regimen and medications. We anticipate the comorbid conditions to improve as we address his morbid obesity.    A total of 15 minutes was spent face to face with this patient on video and over half of the time was spent on counseling and coordination of care for the disease of obesity. We specifically reviewed the dietary prescription and I made recommendations toward increasing exercise as tolerated as well as focusing on training their behavior toward storing less.    Follow up in 1 month for a weight recheck.    HILDA Tapia    05/28/20  11:58  Patient Care Team:  Angelo Mckinnon APRN as PCP - General (Family Medicine)

## 2020-06-16 ENCOUNTER — TELEPHONE (OUTPATIENT)
Dept: VASCULAR SURGERY | Facility: CLINIC | Age: 45
End: 2020-06-16

## 2020-06-18 ENCOUNTER — TELEMEDICINE (OUTPATIENT)
Dept: BARIATRICS/WEIGHT MGMT | Facility: CLINIC | Age: 45
End: 2020-06-18

## 2020-06-18 DIAGNOSIS — I10 ESSENTIAL HYPERTENSION: ICD-10-CM

## 2020-06-18 DIAGNOSIS — G47.33 OBSTRUCTIVE SLEEP APNEA SYNDROME: ICD-10-CM

## 2020-06-18 DIAGNOSIS — E66.2 CLASS 3 OBESITY WITH ALVEOLAR HYPOVENTILATION, SERIOUS COMORBIDITY, AND BODY MASS INDEX (BMI) OF 50.0 TO 59.9 IN ADULT (HCC): Primary | ICD-10-CM

## 2020-06-18 PROCEDURE — 99442 PR PHYS/QHP TELEPHONE EVALUATION 11-20 MIN: CPT | Performed by: NURSE PRACTITIONER

## 2020-06-18 NOTE — PROGRESS NOTES
Patient Care Team:  Angelo Mckinnon APRN as PCP - General (Family Medicine)    Reason for Visit: Medical vs Surgical Weight Loss (Visit 5)    Type of Visit: Video Visit  Provider Location: [facility]  Patient Location: At work in a private location     Subjective        Santiago Mcgraw is a 45 y.o. year old male who is attending a video visit for follow-up and continued medical management of morbid obesity. He does not have access to a scale currently. He states he suffers from high blood pressure, sleep apnea, and morbid obesity due to weight gain. He is currently following the 4 meals/day diet prescription. Santiago Mcgraw previously agreed to incorporate the prescription as provided, while also increasing physical exercise. Patient states he for the last two weeks he has been back on track and successful at eating 4 meals/day, vegetables with every meal, and limiting carbohydrates after lunch but has struggled with drinking diet sodas every few days. He has not been exercising. Santiago Mcgraw also states he has been drinking  ounces of water and is unsure on grams of protein intake per day. He feels that he has lost weight since his last visit.     Review of Systems   Constitutional: Negative.    HENT: Negative.    Eyes: Negative.    Respiratory: Negative.    Cardiovascular: Negative.    Gastrointestinal: Negative.    Endocrine: Negative.    Musculoskeletal: Positive for arthralgias.   Skin: Negative.    Neurological: Negative.    Hematological: Negative.    Psychiatric/Behavioral: Negative.        History  Past Medical History:   Diagnosis Date   • Hypertension    • Pain     back and joint   • Sleep apnea     c pap     Past Surgical History:   Procedure Laterality Date   • HIP SURGERY Left      Family History   Problem Relation Age of Onset   • Arthritis Mother    • Hypertension Mother    • Obesity Mother    • Arthritis Father    • Diabetes Father    • Hypertension Father    • Obesity Father     • Arthritis Brother    • Obesity Brother    • Arthritis Maternal Grandmother    • Hypertension Maternal Grandmother    • Cancer Maternal Grandfather    • Hypertension Maternal Grandfather    • Arthritis Paternal Grandmother    • Hypertension Paternal Grandmother    • Obesity Paternal Grandmother    • Hypertension Paternal Grandfather    • Stroke Paternal Grandfather    • Obesity Paternal Grandfather      Social History     Tobacco Use   • Smoking status: Never Smoker   • Smokeless tobacco: Never Used   Substance Use Topics   • Alcohol use: Never     Frequency: Never   • Drug use: Not on file       (Not in a hospital admission)  Allergies:  Latex and Adhesive tape      Current Outpatient Medications:   •  diclofenac-miSOPROStol (ARTHROTEC 75) 75-0.2 MG EC tablet, Take 1 tablet by mouth 2 (Two) Times a Day., Disp: , Rfl:   •  lisinopril (PRINIVIL,ZESTRIL) 20 MG tablet, Take 20 mg by mouth Daily., Disp: , Rfl:     Objective     Vital Signs     There is no height or weight on file to calculate BMI.      06/18/20  1300   Weight: (No Scale)     Physical Exam   Constitutional: He appears well-developed and well-nourished.   HENT:   Head: Normocephalic and atraumatic.   Eyes: Conjunctivae and EOM are normal.   Neck: Neck normal appearance.  Pulmonary/Chest: Effort normal.   Abdominal:   Obese   Musculoskeletal: Normal range of motion.   Neurological: He is alert.   Skin: Skin is dry.   Psychiatric: He has a normal mood and affect.          Results Review:   None      Assessment/Plan   Encounter Diagnoses   Name Primary?   • Class 3 obesity with alveolar hypoventilation, serious comorbidity, and body mass index (BMI) of 50.0 to 59.9 in adult (CMS/[facility]) Yes   • Essential hypertension    • Obstructive sleep apnea syndrome          1. Santiago Mcgraw was seen today via video visit for follow-up, obesity, nutrition counseling and weight loss.Today we discussed consistency with healthy changes in lifestyle, diet, and exercise for  long term success. Santiago Mcgraw had received handouts to him explaining the recommendation on portion sizes/appetite control/reading nutrition labels. Intensive behavioral therapy for obesity was done today as well.    Goals for this month are: continue to follow the meal prescription as provided focusing on eating 4 meals/day with vegetables at every meal, eliminating carbohydrates after lunch, and getting adequate water and protein intake. Patient encouraged to call with questions and/or struggles as they may arise prior to next scheduled appointment.    2. Current comorbid conditions of hypertension and PAPITO associated with his morbid obesity are reported to be stable on his current treatment regimen and medications. We anticipate the comorbid conditions to improve as we address his morbid obesity.    A total of 15 minutes was spent face to face with this patient on video and over half of the time was spent on counseling and coordination of care for the disease of obesity. We specifically reviewed the dietary prescription and I made recommendations toward increasing exercise as tolerated as well as focusing on training their behavior toward storing less.    Follow up in 1 month for a weight recheck.    HILDA Tapia    06/29/20  09:13  Patient Care Team:  Angelo Mckinnon APRN as PCP - General (Family Medicine)

## 2020-10-05 RX ORDER — DICLOFENAC SODIUM 75 MG/1
TABLET, DELAYED RELEASE ORAL
Qty: 180 TABLET | Refills: 2 | Status: SHIPPED | OUTPATIENT
Start: 2020-10-05

## 2021-08-06 ENCOUNTER — TELEMEDICINE (OUTPATIENT)
Dept: BARIATRICS/WEIGHT MGMT | Facility: CLINIC | Age: 46
End: 2021-08-06

## 2021-08-06 DIAGNOSIS — G47.33 OBSTRUCTIVE SLEEP APNEA SYNDROME: ICD-10-CM

## 2021-08-06 DIAGNOSIS — E66.2 CLASS 3 OBESITY WITH ALVEOLAR HYPOVENTILATION, SERIOUS COMORBIDITY, AND BODY MASS INDEX (BMI) OF 50.0 TO 59.9 IN ADULT (HCC): Primary | ICD-10-CM

## 2021-08-06 DIAGNOSIS — I10 ESSENTIAL HYPERTENSION: ICD-10-CM

## 2021-08-06 PROCEDURE — 99213 OFFICE O/P EST LOW 20 MIN: CPT | Performed by: NURSE PRACTITIONER

## 2021-08-06 NOTE — PROGRESS NOTES
Patient Care Team:  Angelo Mckinnon APRN as PCP - General (Family Medicine)    Reason for Visit:  Surgical Weight loss    You have chosen to receive care through a telehealth visit.  Do you consent to use a video/audio connection for your medical care today? Yes    Type of Visit: Video Visit  Location of Patient: HOME  Location of Provider: Home    Subjective   Santiago Mcgraw is a 46 y.o. male.     Santiago is here for follow-up and continued medical management of his morbid obesity.  He is currently on a prescription diet.  Santiago previously was to apply dietary changes such as following the meal plan as directed.  He admits to eating multiple times per day and grazing.  As a result he gained weight since his last visit.  Patient was with our program and ended in 3/2020 and states he is back and would like to re start our program and have bariatric surgery.     Review Of Systems:  Review of Systems   Constitutional: Negative.    Respiratory: Negative.    Cardiovascular: Negative.    Gastrointestinal: Negative.    Endocrine: Negative.    Musculoskeletal: Positive for arthralgias.   Psychiatric/Behavioral: Negative.          The following portions of the patient's history were reviewed and updated as appropriate: allergies, current medications, past family history, past medical history, past social history, past surgical history and problem list.    Objective   There were no vitals taken for this visit.  There were no vitals filed for this visit.    Physical Exam  Neurological:      Mental Status: He is alert and oriented to person, place, and time.         Patient's There is no height or weight on file to calculate BMI. indicating that he is morbidly obese (BMI > 40 or > 35 with obesity - related health condition). Obesity-related health conditions include the following: obstructive sleep apnea and hypertension. Obesity is newly identified. BMI is is above average; BMI management plan is completed. We discussed  "portion control and increasing exercise..      Assessment/Plan     Encounter Diagnoses   Name Primary?   • Class 3 obesity with alveolar hypoventilation, serious comorbidity, and body mass index (BMI) of 50.0 to 59.9 in adult (CMS/[facility]) Yes    Comment: Nutrition counseling and prescription meal plan discussed and ordered   • Essential hypertension     Comment: Advised to check daily. Nutriton counseling provided.    • Obstructive sleep apnea syndrome        Santiago Mcgraw was seen today for follow-up, obesity, nutrition counseling and weight loss.  He has gained weight since his last visit.  Today we discussed healthy changes in lifestyle, diet, and exercise. Dietician consultation obtained.  Santiago Mcgraw had received handouts to him explaining the recommendation on portion sizes/appetite control/reading nutrition labels.   Intensive behavioral therapy for obesity was done today as well.     He has been provided a structured dietary regimen based off of his behavior.  I discussed with the patient the etiology of the disease of obesity and the potential comorbid conditions associated with this disease.  He was instructed to follow the dietary regimen and follow-up with our program in 1 month's time with any additional questions as they may arise during this time.  We emphasized on focusing on proteins and meals high in fiber as well as adequate hydration that exceed 64 ounces of water daily.    I explained that I anticipate the patient to lose weight prior to his next monthly visit.  I have also explained that they need to record or document when they are going to have the \"cheat day\".      Goals for this month are:   1. Begin Prescription meal plan.   2. Discussed setting timers in order to remind patient to eat 4 meals per day   3. See dietitian in 1 week   4. Advised to monitor BP levels as he begins to lose weight. F/U with PCP for medication adjustments     Follow up in one month for a weight recheck.  "

## 2021-08-09 ENCOUNTER — APPOINTMENT (OUTPATIENT)
Dept: CT IMAGING | Age: 46
End: 2021-08-09
Payer: COMMERCIAL

## 2021-08-09 ENCOUNTER — HOSPITAL ENCOUNTER (EMERGENCY)
Age: 46
Discharge: HOME | End: 2021-08-09
Payer: COMMERCIAL

## 2021-08-09 VITALS
BODY MASS INDEX: 42.66 KG/M2 | HEART RATE: 77 BPM | HEIGHT: 72 IN | OXYGEN SATURATION: 98 % | SYSTOLIC BLOOD PRESSURE: 151 MMHG | RESPIRATION RATE: 17 BRPM | DIASTOLIC BLOOD PRESSURE: 81 MMHG | TEMPERATURE: 98.7 F | WEIGHT: 315 LBS

## 2021-08-09 DIAGNOSIS — K61.1 PERIRECTAL ABSCESS: Primary | ICD-10-CM

## 2021-08-09 LAB
ALBUMIN SERPL-MCNC: 4.1 G/DL (ref 3.5–5.2)
ALP BLD-CCNC: 98 U/L (ref 40–130)
ALT SERPL-CCNC: 12 U/L (ref 5–41)
ANION GAP SERPL CALCULATED.3IONS-SCNC: 13 MMOL/L (ref 7–19)
AST SERPL-CCNC: 16 U/L (ref 5–40)
BASOPHILS ABSOLUTE: 0.1 K/UL (ref 0–0.2)
BASOPHILS RELATIVE PERCENT: 0.7 % (ref 0–1)
BILIRUB SERPL-MCNC: 0.6 MG/DL (ref 0.2–1.2)
BUN BLDV-MCNC: 14 MG/DL (ref 6–20)
CALCIUM SERPL-MCNC: 9.4 MG/DL (ref 8.6–10)
CHLORIDE BLD-SCNC: 101 MMOL/L (ref 98–111)
CO2: 27 MMOL/L (ref 22–29)
CREAT SERPL-MCNC: 0.9 MG/DL (ref 0.5–1.2)
EOSINOPHILS ABSOLUTE: 0.2 K/UL (ref 0–0.6)
EOSINOPHILS RELATIVE PERCENT: 1.3 % (ref 0–5)
GFR AFRICAN AMERICAN: >59
GFR NON-AFRICAN AMERICAN: >60
GLUCOSE BLD-MCNC: 127 MG/DL (ref 74–109)
HCT VFR BLD CALC: 44.9 % (ref 42–52)
HEMOGLOBIN: 14.8 G/DL (ref 14–18)
IMMATURE GRANULOCYTES #: 0.1 K/UL
LACTIC ACID: 1.2 MMOL/L (ref 0.5–1.9)
LYMPHOCYTES ABSOLUTE: 1.5 K/UL (ref 1.1–4.5)
LYMPHOCYTES RELATIVE PERCENT: 11.6 % (ref 20–40)
MCH RBC QN AUTO: 27.7 PG (ref 27–31)
MCHC RBC AUTO-ENTMCNC: 33 G/DL (ref 33–37)
MCV RBC AUTO: 84.1 FL (ref 80–94)
MONOCYTES ABSOLUTE: 1.2 K/UL (ref 0–0.9)
MONOCYTES RELATIVE PERCENT: 9 % (ref 0–10)
NEUTROPHILS ABSOLUTE: 9.8 K/UL (ref 1.5–7.5)
NEUTROPHILS RELATIVE PERCENT: 77 % (ref 50–65)
PDW BLD-RTO: 14 % (ref 11.5–14.5)
PLATELET # BLD: 263 K/UL (ref 130–400)
PMV BLD AUTO: 10.8 FL (ref 9.4–12.4)
POTASSIUM REFLEX MAGNESIUM: 3.8 MMOL/L (ref 3.5–5)
RBC # BLD: 5.34 M/UL (ref 4.7–6.1)
SARS-COV-2, NAAT: NOT DETECTED
SODIUM BLD-SCNC: 141 MMOL/L (ref 136–145)
TOTAL PROTEIN: 7.7 G/DL (ref 6.6–8.7)
WBC # BLD: 12.8 K/UL (ref 4.8–10.8)

## 2021-08-09 PROCEDURE — 96367 TX/PROPH/DG ADDL SEQ IV INF: CPT

## 2021-08-09 PROCEDURE — 74177 CT ABD & PELVIS W/CONTRAST: CPT

## 2021-08-09 PROCEDURE — 99283 EMERGENCY DEPT VISIT LOW MDM: CPT

## 2021-08-09 PROCEDURE — 96365 THER/PROPH/DIAG IV INF INIT: CPT

## 2021-08-09 PROCEDURE — 80053 COMPREHEN METABOLIC PANEL: CPT

## 2021-08-09 PROCEDURE — 87635 SARS-COV-2 COVID-19 AMP PRB: CPT

## 2021-08-09 PROCEDURE — 2500000003 HC RX 250 WO HCPCS: Performed by: NURSE PRACTITIONER

## 2021-08-09 PROCEDURE — 87040 BLOOD CULTURE FOR BACTERIA: CPT

## 2021-08-09 PROCEDURE — 36415 COLL VENOUS BLD VENIPUNCTURE: CPT

## 2021-08-09 PROCEDURE — 87070 CULTURE OTHR SPECIMN AEROBIC: CPT

## 2021-08-09 PROCEDURE — 83605 ASSAY OF LACTIC ACID: CPT

## 2021-08-09 PROCEDURE — 2580000003 HC RX 258: Performed by: NURSE PRACTITIONER

## 2021-08-09 PROCEDURE — 6360000004 HC RX CONTRAST MEDICATION: Performed by: NURSE PRACTITIONER

## 2021-08-09 PROCEDURE — 87205 SMEAR GRAM STAIN: CPT

## 2021-08-09 PROCEDURE — 6360000002 HC RX W HCPCS: Performed by: NURSE PRACTITIONER

## 2021-08-09 PROCEDURE — 87186 SC STD MICRODIL/AGAR DIL: CPT

## 2021-08-09 PROCEDURE — 85025 COMPLETE CBC W/AUTO DIFF WBC: CPT

## 2021-08-09 PROCEDURE — 87075 CULTR BACTERIA EXCEPT BLOOD: CPT

## 2021-08-09 RX ORDER — 0.9 % SODIUM CHLORIDE 0.9 %
1000 INTRAVENOUS SOLUTION INTRAVENOUS ONCE
Status: COMPLETED | OUTPATIENT
Start: 2021-08-09 | End: 2021-08-09

## 2021-08-09 RX ORDER — AMOXICILLIN AND CLAVULANATE POTASSIUM 875; 125 MG/1; MG/1
1 TABLET, FILM COATED ORAL 2 TIMES DAILY
Qty: 20 TABLET | Refills: 0 | Status: SHIPPED | OUTPATIENT
Start: 2021-08-09 | End: 2021-08-19

## 2021-08-09 RX ORDER — HYDROCODONE BITARTRATE AND ACETAMINOPHEN 5; 325 MG/1; MG/1
1 TABLET ORAL EVERY 6 HOURS PRN
Qty: 12 TABLET | Refills: 0 | Status: SHIPPED | OUTPATIENT
Start: 2021-08-09 | End: 2021-08-12

## 2021-08-09 RX ORDER — DOCUSATE SODIUM 100 MG/1
100 CAPSULE, LIQUID FILLED ORAL 2 TIMES DAILY
Qty: 20 CAPSULE | Refills: 0 | Status: SHIPPED | OUTPATIENT
Start: 2021-08-09 | End: 2021-08-19

## 2021-08-09 RX ADMIN — IOPAMIDOL 90 ML: 755 INJECTION, SOLUTION INTRAVENOUS at 18:45

## 2021-08-09 RX ADMIN — METRONIDAZOLE 500 MG: 500 INJECTION, SOLUTION INTRAVENOUS at 16:36

## 2021-08-09 RX ADMIN — SODIUM CHLORIDE 1000 ML: 9 INJECTION, SOLUTION INTRAVENOUS at 16:36

## 2021-08-09 RX ADMIN — PIPERACILLIN SODIUM AND TAZOBACTAM SODIUM 4500 MG: 4; .5 INJECTION, POWDER, LYOPHILIZED, FOR SOLUTION INTRAVENOUS at 17:17

## 2021-08-09 ASSESSMENT — ENCOUNTER SYMPTOMS
RECTAL PAIN: 1
ABDOMINAL PAIN: 1
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
## 2021-08-11 LAB
ANAEROBIC CULTURE: ABNORMAL
GRAM STAIN RESULT: ABNORMAL
ORGANISM: ABNORMAL
WOUND/ABSCESS: ABNORMAL
WOUND/ABSCESS: ABNORMAL

## 2021-08-12 ENCOUNTER — NUTRITION (OUTPATIENT)
Dept: BARIATRICS/WEIGHT MGMT | Facility: HOSPITAL | Age: 46
End: 2021-08-12

## 2021-08-12 VITALS — BODY MASS INDEX: 42.66 KG/M2 | HEIGHT: 72 IN | WEIGHT: 315 LBS

## 2021-08-12 NOTE — PROGRESS NOTES
Nutrition Services    Patient Name:  Santiago Mcgraw  YOB: 1975  MRN: [id number]  Admit Date:  (Not on file)    NUTRITION BARIATRIC/M[facility] NOTE     Visit 2  Initial Assessment   BA#   M[facility]    Anthropometrics   Height: 72 in  Weight: 411 lbs 3.2 oz  BMI: 55.77    Nutrition Recall  Eating ___2___ meals daily   Snacking  Excessive sweet intake  Drinking carbonated beverages  Drinking greater to or equal to 64 fluid ounces-goes in spurts    Education    Goal Setting and Information Packet  4 meal per day diet plan   4 meal per day sample menu  Reinforce Nutritional Needs for Surgery  Reinforce Nutritional Needs for MWL    Nutrition Goals   Continue diet changes  Eat _4____ meals per day with protein  Protein goal: 80 gms per day   discussed protein guidelines for shakes and bar  Eliminate snacks  Healthier food choices  Portion control / Use smaller plate or measuring cup   Eliminate soda  Increase fluid intake to 64 ounces per day        Electronically signed by:  Cortney Jane  08/12/21 10:34 CDT

## 2021-08-14 LAB
BLOOD CULTURE, ROUTINE: NORMAL
CULTURE, BLOOD 2: NORMAL

## 2022-02-09 ENCOUNTER — OFFICE VISIT (OUTPATIENT)
Dept: BARIATRICS/WEIGHT MGMT | Facility: CLINIC | Age: 47
End: 2022-02-09

## 2022-02-09 VITALS
HEIGHT: 72 IN | SYSTOLIC BLOOD PRESSURE: 168 MMHG | WEIGHT: 315 LBS | OXYGEN SATURATION: 98 % | HEART RATE: 79 BPM | BODY MASS INDEX: 42.66 KG/M2 | TEMPERATURE: 97.7 F | DIASTOLIC BLOOD PRESSURE: 92 MMHG

## 2022-02-09 DIAGNOSIS — G47.33 OBSTRUCTIVE SLEEP APNEA SYNDROME: ICD-10-CM

## 2022-02-09 DIAGNOSIS — E66.2 CLASS 3 OBESITY WITH ALVEOLAR HYPOVENTILATION, SERIOUS COMORBIDITY, AND BODY MASS INDEX (BMI) OF 50.0 TO 59.9 IN ADULT: Primary | ICD-10-CM

## 2022-02-09 DIAGNOSIS — I10 PRIMARY HYPERTENSION: ICD-10-CM

## 2022-02-09 PROCEDURE — 99214 OFFICE O/P EST MOD 30 MIN: CPT | Performed by: NURSE PRACTITIONER

## 2022-02-09 NOTE — PROGRESS NOTES
Patient Care Team:  Angelo Mckinnon APRN as PCP - General (Family Medicine)      Subjective     Patient is a 46 y.o. male presents with morbid obesity and his Body mass index is 58.05 kg/m².     He is here for discussion of surgical weight loss options.  He stated he has been with the disease of obesity for year(s).  He stated he suffers from PAPITO, HTN and morbid obesity due to his weight gain.  He stated that weight loss helps alleviate these symptoms.   He stated that he has tried other diet regimens including Atkins, low carb to help with weight loss.  He stated that he has attempted these conservative methods for weight loss without maintaining long term success.  Today he would like to discuss surgical weight loss options such as the Laparoscopic Sleeve Gastrectomy or the Laparoscopic R - Y Gastric Bypass.         Review of Systems   Constitutional: Negative.    Respiratory: Negative.    Cardiovascular: Negative.    Gastrointestinal: Negative.    Endocrine: Negative.    Musculoskeletal: Negative.    Psychiatric/Behavioral: Negative.         History  Past Medical History:   Diagnosis Date   • Hypertension    • Pain     back and joint   • Sleep apnea     c pap      Past Surgical History:   Procedure Laterality Date   • HIP SURGERY Left       Social History     Socioeconomic History   • Marital status:    Tobacco Use   • Smoking status: Never Smoker   • Smokeless tobacco: Never Used   Substance and Sexual Activity   • Alcohol use: Never      Family History   Problem Relation Age of Onset   • Arthritis Mother    • Hypertension Mother    • Obesity Mother    • Arthritis Father    • Diabetes Father    • Hypertension Father    • Obesity Father    • Arthritis Brother    • Obesity Brother    • Arthritis Maternal Grandmother    • Hypertension Maternal Grandmother    • Cancer Maternal Grandfather    • Hypertension Maternal Grandfather    • Arthritis Paternal Grandmother    • Hypertension Paternal  Grandmother    • Obesity Paternal Grandmother    • Hypertension Paternal Grandfather    • Stroke Paternal Grandfather    • Obesity Paternal Grandfather       Allergies   Allergen Reactions   • Latex Swelling and Rash   • Adhesive Tape Rash     Latex based tape          Current Outpatient Medications:   •  diclofenac-miSOPROStol (ARTHROTEC 75) 75-0.2 MG EC tablet, Take 1 tablet by mouth 2 (Two) Times a Day., Disp: , Rfl:   •  lisinopril (PRINIVIL,ZESTRIL) 20 MG tablet, Take 20 mg by mouth Daily., Disp: , Rfl:     Objective     Vital Signs  Temp:  [97.7 °F (36.5 °C)] 97.7 °F (36.5 °C)  Heart Rate:  [79] 79  BP: (168)/(92) 168/92  Body mass index is 58.05 kg/m².      02/09/22  1333   Weight: (!) 194 kg (428 lb)       Physical Exam  Vitals reviewed.   Constitutional:       Appearance: He is obese.   Eyes:      Pupils: Pupils are equal, round, and reactive to light.   Cardiovascular:      Rate and Rhythm: Normal rate and regular rhythm.   Pulmonary:      Effort: Pulmonary effort is normal.   Abdominal:      General: Bowel sounds are normal.      Palpations: Abdomen is soft.   Musculoskeletal:         General: Normal range of motion.   Skin:     General: Skin is warm and dry.   Neurological:      Mental Status: He is alert and oriented to person, place, and time.   Psychiatric:         Mood and Affect: Mood normal.         Behavior: Behavior normal.            Results Review:   I reviewed the patient's new clinical results.      Patient's Body mass index is 58.05 kg/m². indicating that he is obese (BMI >30). Obesity-related health conditions include the following: obstructive sleep apnea and hypertension. Obesity is improving with treatment. BMI is is above average; BMI management plan is completed. We discussed portion control and increasing exercise..      Assessment/Plan   Diagnoses and all orders for this visit:    1. Class 3 obesity with alveolar hypoventilation, serious comorbidity, and body mass index (BMI) of 50.0  to 59.9 in adult (HCC) (Primary)    2. Primary hypertension  Comments:  Monitor blood pressure level.  Blood pressure elevated today.  Contact PCP for medication adjustments if needed.    3. Obstructive sleep apnea syndrome  Comments:  Continue CPAP.  Overview:  c pap      He has been provided a structured dietary regimen based off of his behavior.  I discussed with the patient the etiology of the disease of obesity and the potential comorbid conditions associated with this disease.  He was instructed to follow the dietary regimen and follow-up with our program in 1 month's time with any additional questions as they may arise during this time.  We emphasized on focusing on proteins and meals high in fiber as well as adequate hydration that exceed 64 ounces of water daily.    I explained that I anticipate the patient to lose weight prior to his next monthly visit.       I discussed the patient's findings and my recommendations with patient.     I have also recommended that he obtain a cardiac risk assessment and psychiatric evaluation  prior to surgery consideration.    1. Patient is a 46 y.o. male who has morbid obesity with Body mass index is 58.05 kg/m². and desires surgical weight loss. Patient has been advised that this surgery is considered to be elective major surgery that is typically done laparoscopically. Patient is a potentially good surgical candidate. Patient will need to meet with the Bariatric Surgeon to further discuss surgical options. Patient has been advised that they will need to have a work-up prior to surgery. This work-up will include but is not limited to an EKG, an EGD to assess for H. Pylori and Conley's esophagus, and a psychological evaluation, with additional testing as necessary. Pre-op testing will be ordered at next visit. Today the patient  received the 4 meals/day diet prescription, which was explained to patient.  Patient will see the dietitian today to further discuss goals for  diet, exercise, and lifestyle. Patient has received intensive behavioral therapy for obesity today. I explained the pathophysiology of the disease and its storage component. We also discussed Dr. Luo' pearls of the program. Nutrition counseling ordered today.     2. Current comorbid condition of HTN, PAPITO, associated with his morbid obesity is reported to be stable on his current treatment regimen and medications. We anticipate the comorbid condition to improve as we address his morbid obesity.       Pre-op testing:     Seminar - Completed  EGD - Needed, but not yet ordered  H. Pylori - Will be done with EGD   H. Pylori Stool -  N/A    Psychological Evaluation - Needed, but not yet ordered    EKG - Needed, but not yet ordered    Cardiology - If EKG abnormal, cardiology will be ordered     TSH - Needed, but not yet ordered  Nicotine - N/A    Pulmonary Clearance - N/A   Drug Tests - N/A    Dietitian - NEEDS    Francie Cabrera, APRN     02/09/22  15:15 CST

## 2022-04-09 DIAGNOSIS — I10 ESSENTIAL HYPERTENSION: ICD-10-CM

## 2022-04-11 RX ORDER — LISINOPRIL 20 MG/1
TABLET ORAL
Qty: 90 TABLET | Refills: 3 | OUTPATIENT
Start: 2022-04-11

## 2022-10-10 ENCOUNTER — OFFICE VISIT (OUTPATIENT)
Dept: BARIATRICS/WEIGHT MGMT | Facility: CLINIC | Age: 47
End: 2022-10-10

## 2022-10-10 VITALS
BODY MASS INDEX: 42.66 KG/M2 | OXYGEN SATURATION: 98 % | WEIGHT: 315 LBS | HEART RATE: 85 BPM | TEMPERATURE: 98.4 F | DIASTOLIC BLOOD PRESSURE: 80 MMHG | SYSTOLIC BLOOD PRESSURE: 152 MMHG | HEIGHT: 72 IN

## 2022-10-10 DIAGNOSIS — G47.33 OBSTRUCTIVE SLEEP APNEA SYNDROME: ICD-10-CM

## 2022-10-10 DIAGNOSIS — I10 PRIMARY HYPERTENSION: ICD-10-CM

## 2022-10-10 DIAGNOSIS — E66.01 CLASS 3 SEVERE OBESITY DUE TO EXCESS CALORIES WITH SERIOUS COMORBIDITY AND BODY MASS INDEX (BMI) OF 50.0 TO 59.9 IN ADULT: Primary | ICD-10-CM

## 2022-10-10 PROCEDURE — 99213 OFFICE O/P EST LOW 20 MIN: CPT | Performed by: NURSE PRACTITIONER

## 2022-10-10 RX ORDER — BUPROPION HYDROCHLORIDE 150 MG/1
150 TABLET, EXTENDED RELEASE ORAL DAILY
COMMUNITY
End: 2022-12-19 | Stop reason: DRUGHIGH

## 2022-10-10 RX ORDER — UBIDECARENONE 100 MG
100 CAPSULE ORAL DAILY
COMMUNITY
End: 2023-02-24 | Stop reason: HOSPADM

## 2022-10-10 RX ORDER — MULTIPLE VITAMINS W/ MINERALS TAB 9MG-400MCG
1 TAB ORAL DAILY
COMMUNITY
End: 2023-02-24 | Stop reason: HOSPADM

## 2022-10-10 NOTE — PROGRESS NOTES
"Metabolic and Bariatric Surgery Adult Nutrition Assessment    Patient Name: Santiago Mcgraw   YOB: 1975   MRN: [id number]     Assessment Date:  10/10/2022     Reason for Visit: Initial Nutrition Assessment     Treatment Pathway: Preoperative Bariatric Surgery, Visit 1    Assessment    Anthropometrics   Wt Readings from Last 1 Encounters:   10/10/22 (!) 190 kg (418 lb 12.8 oz)     Ht Readings from Last 1 Encounters:   10/10/22 182.9 cm (72\")     BMI Readings from Last 1 Encounters:   10/10/22 56.80 kg/m²        Initial Weight/Date: 318.8 lbs (Oct 2022)  Weight Changes since last visit: n/a  Net Weight Change: n/a    Past Medical History:   Diagnosis Date   • Hypertension    • Pain     back and joint   • Sleep apnea     c pap      Past Surgical History:   Procedure Laterality Date   • HIP SURGERY Left       Current Outpatient Medications   Medication Sig Dispense Refill   • buPROPion SR (WELLBUTRIN SR) 150 MG 12 hr tablet Take 1 tablet by mouth Daily.     • coenzyme Q10 100 MG capsule Take 1 capsule by mouth Daily.     • diclofenac-miSOPROStol (ARTHROTEC 75) 75-0.2 MG EC tablet Take 1 tablet by mouth 2 (Two) Times a Day.     • lisinopril (PRINIVIL,ZESTRIL) 20 MG tablet Take 20 mg by mouth Daily.     • multivitamin with minerals (MULTIVITAMIN ADULT PO) Take 1 tablet by mouth Daily.       No current facility-administered medications for this visit.      Allergies   Allergen Reactions   • Latex Swelling and Rash   • Adhesive Tape Rash     Latex based tape          Motivation for weight loss includes: would like to have knee replacement. Improve quality of life. Ride roller coasters at [employer].     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     Nutrition Recall  Eating 2 meals daily + grazing.   24 hr recall  (M1) no brkfst yesterday  (M2) slice of pizza on the way to work.   (M3) n/a  (M4) n/a  Snacking - cheese, crackers, potato chips, snickers, "   Monitoring portions- not currently  Calculating Protein- not currently  Drinking sugary/carbonated beverages- sodas (5+ cans daily).   Fluid Intake- water 64 ounces+     Exercise: minimal d/t knees.   Recommended increasing physical activity, beyond normal daily habits, gradually working to reach ~30 minutes daily.     Nutrition Intervention  Nutrition education and nutrition coaching for behavior change provided.  Strategies used included Comprehensive education, Motivational Interviewing , Problem Solving, Skill Development for meal planning and Provided sample menus  Review of medical weight loss prescription 4 meal/day plan and reviewed nutritional needs for Preoperative Bariatric Surgery, Visit 1.  Self-monitoring strategies such as keeping a food journal (on paper or electronically) and calculating fluid/protein intake were discussed.    Recommended Diet Changes  Eat 4 meals per day with protein and vegetables at each meal, no carbs after meal 2., Protein goal: 80gms., Eat vegetables first at each meal., Discussed protein guidelines for shakes and bars., Reduce snacking -use foods from free foods list only., Reduce fat, sugar, and/or salt in food choices., Choose more nutrient dense foods., Choose foods with increased fiber., Monitor portion sizes using a food scale and/or measuring cup., Eliminate soda and sugar-sweetened beverages and Increase fluid intake to 64 ounces per day      Goals  1. Schedule 4 meals daily.   2. Spend 10-15 min each night to prepare/plan for meals for the next day.   3. Measure portions.     Monitoring/Evaluation Plan  Anticipate follow up per program protocol. Continue collaboration of care with physician and treatment team.     Electronically signed by  Catalina Proctor RDN, LD  10/10/2022 11:33 CDT.

## 2022-11-07 ENCOUNTER — OFFICE VISIT (OUTPATIENT)
Dept: BARIATRICS/WEIGHT MGMT | Facility: CLINIC | Age: 47
End: 2022-11-07

## 2022-11-07 VITALS
TEMPERATURE: 98.4 F | WEIGHT: 315 LBS | BODY MASS INDEX: 42.66 KG/M2 | OXYGEN SATURATION: 98 % | HEART RATE: 66 BPM | HEIGHT: 72 IN | DIASTOLIC BLOOD PRESSURE: 79 MMHG | SYSTOLIC BLOOD PRESSURE: 126 MMHG

## 2022-11-07 DIAGNOSIS — G47.33 OBSTRUCTIVE SLEEP APNEA SYNDROME: ICD-10-CM

## 2022-11-07 DIAGNOSIS — I10 PRIMARY HYPERTENSION: ICD-10-CM

## 2022-11-07 DIAGNOSIS — E66.01 CLASS 3 SEVERE OBESITY DUE TO EXCESS CALORIES WITH SERIOUS COMORBIDITY AND BODY MASS INDEX (BMI) OF 50.0 TO 59.9 IN ADULT: Primary | ICD-10-CM

## 2022-11-07 PROCEDURE — 99214 OFFICE O/P EST MOD 30 MIN: CPT | Performed by: NURSE PRACTITIONER

## 2022-11-07 RX ORDER — TRAMADOL HYDROCHLORIDE 50 MG/1
50 TABLET ORAL EVERY 6 HOURS PRN
COMMUNITY
End: 2023-02-24 | Stop reason: HOSPADM

## 2022-11-07 NOTE — PROGRESS NOTES
"Metabolic and Bariatric Surgery Adult Nutrition Assessment    Patient Name: Santiago Mcgraw   YOB: 1975   MRN: [id number]     Assessment Date:  11/07/2022     Reason for Visit: Follow-up Nutrition Assessment     Treatment Pathway: Preoperative Bariatric Surgery, Visit 2    Assessment    Anthropometrics   Wt Readings from Last 1 Encounters:   11/07/22 (!) 188 kg (415 lb)     Ht Readings from Last 1 Encounters:   11/07/22 182.9 cm (72\")     BMI Readings from Last 1 Encounters:   11/07/22 56.28 kg/m²        Initial Weight/Date: 418.8 lbs (Oct 10, 2022)  Weight Changes since last visit: -3.8 lbs  Net Weight Change: -3.8 lbs    Past Medical History:   Diagnosis Date   • Hypertension    • Pain     back and joint   • Sleep apnea     c pap      Past Surgical History:   Procedure Laterality Date   • HIP SURGERY Left       Current Outpatient Medications   Medication Sig Dispense Refill   • buPROPion SR (WELLBUTRIN SR) 150 MG 12 hr tablet Take 1 tablet by mouth Daily.     • coenzyme Q10 100 MG capsule Take 1 capsule by mouth Daily.     • diclofenac-miSOPROStol (ARTHROTEC 75) 75-0.2 MG EC tablet Take 1 tablet by mouth 2 (Two) Times a Day.     • lisinopril (PRINIVIL,ZESTRIL) 20 MG tablet Take 20 mg by mouth Daily.     • multivitamin with minerals tablet tablet Take 1 tablet by mouth Daily.     • traMADol (ULTRAM) 50 MG tablet Take 1 tablet by mouth Every 6 (Six) Hours As Needed for Moderate Pain.       No current facility-administered medications for this visit.      Allergies   Allergen Reactions   • Latex Swelling and Rash   • Adhesive Tape Rash     Latex based tape        Pertinent Social/Behavior/Environmental History: Wife is very supportive, helped make menus.     Nutrition Recall  Eating 3-4 meals daily   (M1) 2 eggs with piece of turkey sausage OR natural peanut butter. 2 cup raw spinach. 1/2 banana to full banana.   (M2) usually at work- chopped sirloin steak or chicken with onion on flat top grill. Sometimes " a slice of bread or apple for Carb   (M3) can of green beans and meat.   (M4) similar to above.   Snacking - minimal to none.  eli  Monitoring portions- Is weighing meat portions. Is using measuring cups for vegetables   Calculating Protein- 68-80 grams/day.   Drinking sugary/carbonated beverages- 1 can diet coke/d   Fluid Intake- 100+ ounces.      Success this Month: Has really reduced beef intake. Reduced overall snacking. No binge eating.   Barriers:  Family has been down to 1 vehicle d/t wife in car wreck and issues with Santiago's truck. Family/friends are bringing meals to home and choices are based on those.      Exercise: 15-20 minutes x3 days/wk.   Recommended increasing physical activity, beyond normal daily habits, gradually working to reach ~30 minutes daily.     Nutrition Intervention  Nutrition education and nutrition coaching for behavior change provided.  Strategies used included Motivational Interviewing , Problem Solving and Ongoing reinforcement  Review of medical weight loss prescription 4 meal/day plan and reviewed nutritional needs for Preoperative Bariatric Surgery, Visit 2.  Self-monitoring strategies such as keeping a food journal (on paper or electronically) and calculating fluid/protein intake were discussed.    Recommended Diet Changes  Eat 4 meals per day with protein and vegetables at each meal, no carbs after meal 2., Protein goal: 80 gms., Eat vegetables first at each meal., Reduce snacking -use foods from free foods list only., Reduce fat, sugar, and/or salt in food choices., Choose more nutrient dense foods., Choose foods with increased fiber., Monitor portion sizes using a food scale and/or measuring cup. and Eliminate soda and sugar-sweetened beverages      Goals  1. Make a weekly meal plan with spouse.   2. 15-20 minutes each night to preplan for the next day.   3. Completely eliminate soda.     Monitoring/Evaluation Plan  Anticipate follow up per program protocol. Continue  collaboration of care with physician and treatment team.     Electronically signed by  Catalina Proctor RDN, LD  11/07/2022 09:19 CST.

## 2022-11-07 NOTE — ASSESSMENT & PLAN NOTE
Patient's (Body mass index is 56.28 kg/m².) indicates that they are morbidly obese (BMI > 40 or > 35 with obesity - related health condition) with health conditions that include obstructive sleep apnea and hypertension . Weight is improving with treatment. BMI is is above average; BMI management plan is completed. We discussed portion control and increasing exercise.

## 2022-11-07 NOTE — PROGRESS NOTES
"Patient Care Team:  Angelo Mckinnon APRN as PCP - General (Family Medicine)    Reason for Visit:  Surgical Weight loss, V2    Subjective   Santiago Mcgraw is a 47 y.o. male.     Santiago is here for follow-up and continued medical management of his morbid obesity.  He is currently on a prescription diet.  Santiago previously was to apply dietary changes such as following the meal plan as directed.  He admits to eating 3-4 meals per day.  As a result he lost weight since his last visit.    Review Of Systems:  Review of Systems   Constitutional: Negative.    Respiratory: Negative.    Cardiovascular: Negative.    Gastrointestinal: Negative.    Endocrine: Negative.    Musculoskeletal: Negative.    Psychiatric/Behavioral: Negative.          The following portions of the patient's history were reviewed and updated as appropriate: allergies, current medications, past family history, past medical history, past social history, past surgical history, and problem list.    Objective   /79 (BP Location: Right arm, Patient Position: Sitting, Cuff Size: Adult)   Pulse 66   Temp 98.4 °F (36.9 °C)   Ht 182.9 cm (72\")   Wt (!) 188 kg (415 lb)   SpO2 98%   BMI 56.28 kg/m²       11/07/22  0907   Weight: (!) 188 kg (415 lb)       Physical Exam  Vitals reviewed.   Constitutional:       Appearance: He is obese.   Cardiovascular:      Rate and Rhythm: Normal rate and regular rhythm.   Pulmonary:      Effort: Pulmonary effort is normal.   Musculoskeletal:         General: Normal range of motion.   Skin:     General: Skin is warm and dry.   Neurological:      Mental Status: He is alert and oriented to person, place, and time.   Psychiatric:         Mood and Affect: Mood normal.         Behavior: Behavior normal.         Class 3 Severe Obesity (BMI >=40). Obesity-related health conditions include the following: obstructive sleep apnea and hypertension. Obesity is improving with treatment. BMI is is above average; BMI management " plan is completed. We discussed portion control and increasing exercise.     Assessment & Plan   Diagnoses and all orders for this visit:    1. Class 3 severe obesity due to excess calories with serious comorbidity and body mass index (BMI) of 50.0 to 59.9 in adult (HCC) (Primary)  Assessment & Plan:  Patient's (Body mass index is 56.28 kg/m².) indicates that they are morbidly obese (BMI > 40 or > 35 with obesity - related health condition) with health conditions that include obstructive sleep apnea and hypertension . Weight is improving with treatment. BMI is is above average; BMI management plan is completed. We discussed portion control and increasing exercise.       2. Obstructive sleep apnea syndrome  Comments:  Wears CPAP nightly  Overview:  c pap      3. Primary hypertension  Comments:  Continue current regimen.  Orders:  -     ECG 12 Lead; Future       Santiago Mcgraw was seen today for follow-up, obesity, nutrition counseling and weight loss.  He has lost weight since his last visit.  Today we discussed healthy changes in lifestyle, diet, and exercise. Dietician consultation obtained.  Santiago Mcgraw had received handouts to him explaining the recommendation on portion sizes/appetite control/reading nutrition labels.   Intensive behavioral therapy for obesity was done today as well.     Goals for this month are:   1. Sees psychiatry at Dr Blanco's office and will request a letter himself.   2. EKG ordered   3. Has PAPITO, no sleep study ordered.     Follow up in one month for a weight recheck.  Patient has been encouraged to continue working with spouse and planning ahead and meal prepping.  He is agreeable with plan.  Patient also states that he plans to attend support group this month.

## 2022-11-11 ENCOUNTER — HOSPITAL ENCOUNTER (OUTPATIENT)
Dept: CARDIOLOGY | Facility: HOSPITAL | Age: 47
Discharge: HOME | End: 2022-11-11
Admitting: NURSE PRACTITIONER

## 2022-11-11 DIAGNOSIS — G47.33 OBSTRUCTIVE SLEEP APNEA SYNDROME: ICD-10-CM

## 2022-11-11 DIAGNOSIS — I10 PRIMARY HYPERTENSION: ICD-10-CM

## 2022-11-11 DIAGNOSIS — E66.01 CLASS 3 SEVERE OBESITY DUE TO EXCESS CALORIES WITH SERIOUS COMORBIDITY AND BODY MASS INDEX (BMI) OF 50.0 TO 59.9 IN ADULT: ICD-10-CM

## 2022-11-11 PROCEDURE — 93010 ELECTROCARDIOGRAM REPORT: CPT | Performed by: INTERNAL MEDICINE

## 2022-11-11 PROCEDURE — 93005 ELECTROCARDIOGRAM TRACING: CPT | Performed by: NURSE PRACTITIONER

## 2022-11-15 LAB
QT INTERVAL: 420 MS
QTC INTERVAL: 422 MS

## 2022-12-19 ENCOUNTER — OFFICE VISIT (OUTPATIENT)
Dept: BARIATRICS/WEIGHT MGMT | Facility: CLINIC | Age: 47
End: 2022-12-19

## 2022-12-19 VITALS
HEART RATE: 84 BPM | SYSTOLIC BLOOD PRESSURE: 132 MMHG | TEMPERATURE: 98.2 F | OXYGEN SATURATION: 98 % | WEIGHT: 315 LBS | BODY MASS INDEX: 42.66 KG/M2 | DIASTOLIC BLOOD PRESSURE: 84 MMHG | HEIGHT: 72 IN

## 2022-12-19 DIAGNOSIS — I10 PRIMARY HYPERTENSION: ICD-10-CM

## 2022-12-19 DIAGNOSIS — E66.01 CLASS 3 SEVERE OBESITY DUE TO EXCESS CALORIES WITH SERIOUS COMORBIDITY AND BODY MASS INDEX (BMI) OF 50.0 TO 59.9 IN ADULT: Primary | ICD-10-CM

## 2022-12-19 DIAGNOSIS — Z87.898 HISTORY OF HEARTBURN: ICD-10-CM

## 2022-12-19 DIAGNOSIS — G47.33 OBSTRUCTIVE SLEEP APNEA SYNDROME: ICD-10-CM

## 2022-12-19 PROCEDURE — 99214 OFFICE O/P EST MOD 30 MIN: CPT | Performed by: SURGERY

## 2022-12-19 RX ORDER — DICLOFENAC SODIUM 75 MG/1
75 TABLET, DELAYED RELEASE ORAL 2 TIMES DAILY
COMMUNITY
Start: 2022-12-05 | End: 2023-02-06

## 2022-12-19 RX ORDER — BUPROPION HYDROCHLORIDE 150 MG/1
150 TABLET ORAL EVERY MORNING
Status: ON HOLD | COMMUNITY
Start: 2022-12-05

## 2022-12-19 NOTE — H&P (VIEW-ONLY)
Patient Care Team:  Angelo Mckinnon APRN as PCP - General (Family Medicine)    Reason for Visit:  Surgical Weight loss      Subjective     Santiago Mcgraw is a pleasant 47 y.o. male and presents with morbid obesity with his Body mass index is 56.12 kg/m².    He is here for discussion of the upper endoscopic procedure.  He stated he has been with the disease of obesity for year(s).  He stated he suffers from obstructive sleep apnea, hypertension and a history of heartburn and morbid obesity due to his weight gain.  He stated that weight loss helps alleviate these symptoms.   He stated that he has tried multiple diet regimens including currently participating in a medically supervised weight loss program to help with weight loss.  He stated that he has attempted these conservative methods for weight loss without maintaining long term success.  Today heand myself will discuss surgical weight loss options such as the Laparoscopic Sleeve Gastrectomy or the Laparoscopic R - Y Gastric Bypass.        Review of Systems  General ROS: positive for  - fatigue and sleep disturbance  Psychological ROS: positive for - anxiety  Respiratory ROS: positive for - shortness of breath  negative for - cough, tachypnea or wheezing  Cardiovascular ROS: positive for - dyspnea on exertion and shortness of breath  negative for - edema, irregular heartbeat, palpitations or rapid heart rate  Gastrointestinal ROS: no abdominal pain, change in bowel habits, or black or bloody stools  Musculoskeletal ROS: positive for - joint pain    History  Past Medical History:   Diagnosis Date   • Hypertension    • Pain     back and joint   • Sleep apnea     c pap     Past Surgical History:   Procedure Laterality Date   • HIP SURGERY Left      Family History   Problem Relation Age of Onset   • Arthritis Mother    • Hypertension Mother    • Obesity Mother    • Arthritis Father    • Diabetes Father    • Hypertension Father    • Obesity Father    •  Arthritis Brother    • Obesity Brother    • Arthritis Maternal Grandmother    • Hypertension Maternal Grandmother    • Cancer Maternal Grandfather    • Hypertension Maternal Grandfather    • Arthritis Paternal Grandmother    • Hypertension Paternal Grandmother    • Obesity Paternal Grandmother    • Hypertension Paternal Grandfather    • Stroke Paternal Grandfather    • Obesity Paternal Grandfather      Social History     Tobacco Use   • Smoking status: Never   • Smokeless tobacco: Never   Substance Use Topics   • Alcohol use: Never     E-cigarette/Vaping     E-cigarette/Vaping Substances     (Not in a hospital admission)    Allergies:  Latex and Adhesive tape      Current Outpatient Medications:   •  coenzyme Q10 100 MG capsule, Take 1 capsule by mouth Daily., Disp: , Rfl:   •  lisinopril (PRINIVIL,ZESTRIL) 20 MG tablet, Take 20 mg by mouth Daily., Disp: , Rfl:   •  multivitamin with minerals tablet tablet, Take 1 tablet by mouth Daily., Disp: , Rfl:   •  traMADol (ULTRAM) 50 MG tablet, Take 1 tablet by mouth Every 6 (Six) Hours As Needed for Moderate Pain., Disp: , Rfl:   •  buPROPion XL (WELLBUTRIN XL) 150 MG 24 hr tablet, Take 150 mg by mouth Every Morning., Disp: , Rfl:   •  diclofenac (VOLTAREN) 75 MG EC tablet, Take 75 mg by mouth 2 (Two) Times a Day., Disp: , Rfl:     Objective     Vital Signs  Temp:  [98.2 °F (36.8 °C)] 98.2 °F (36.8 °C)  Heart Rate:  [84] 84  BP: (132)/(84) 132/84  Body mass index is 56.12 kg/m².      12/19/22 0918   Weight: (!) 188 kg (413 lb 12.8 oz)       Physical Exam:      HEENT: extra ocular movement intact and sclera clear, anicteric  Respiratory: appears well, vitals normal, no respiratory distress, acyanotic, normal RR, chest clear, no wheezing, crepitations, rhonchi, normal symmetric air entry  Cardiovascular: Regular rate and rhythm, S1, S2 normal, no murmur, click, rub or gallop  GI: Soft, non-tender, normal bowel sounds; no bruits, organomegaly or masses.  Abnormal shape:  obese  Neurologic: alert, oriented, normal speech, no focal findings or movement disorder noted       Results Review:   I reviewed the patient's new clinical results.        Assessment & Plan   Encounter Diagnoses   Name Primary?   • Class 3 severe obesity due to excess calories with serious comorbidity and body mass index (BMI) of 50.0 to 59.9 in adult (HCC) Yes   • Primary hypertension    • Obstructive sleep apnea syndrome    • History of heartburn            1.  I believe this patient will be a good candidate for weight loss surgery.  I have discussed the Munira - Y Gastric Bypass, laparoscopic sleeve gastrectomy and the Laparoscopic Gastric Band procedures.  We discussed the benefits of the surgeries including the benefit of weight loss and the possible reversal of co-morbid conditions associated with morbid obesity. I explained to the patient that prior to making a definitive decision on the type of surgery he will require an esophagogastroduodenoscopy with biopsies to assess for any contraindications for surgical weight loss.  I explained the alternatives  include not doing anything, or pursuing an UGI series which only offers a diagnosis with potential less accuracy compared to EGD, the benefits of the EGD such as identifying the pathology and anatomy of the upper GI system, and the risks and complications of the endoscopy were discussed in detail as well. I discussed the risk of perforation (one out of 4267-2477, riskier with dilation), bleeding (one out of 500), and the rare risks of infection, adverse reaction to anesthesia, respiratory failure, cardiac failure including MI and adverse reaction to medications such as allergic reactions. We discussed consequences that could occur if a risk were to develop such as the need for hospitalization, blood transfusion, surgical intervention, medications, pain, disability and death. The patient verbalizes understanding and agrees to proceed. such as bleeding,  perforation, swallowing difficulties and gas bloat can occur after this procedure.    Upon completion of our discussion and addressing and answering his questions to his satisfation, informed consent was obtained.  He will be scheduled accordingly for the esophagogastroduodenoscopy procedure.    I discussed the patients findings and my recommendations with patient.     Dr. Solitario Luo MD [facility]    12/19/22  10:45 CST  Patient Care Team:  Angelo Mckinnon APRN as PCP - General (Family Medicine)

## 2022-12-19 NOTE — PROGRESS NOTES
Patient Care Team:  Angelo Mckinnon APRN as PCP - General (Family Medicine)    Reason for Visit:  Surgical Weight loss      Subjective     Santiago Mcgraw is a pleasant 47 y.o. male and presents with morbid obesity with his Body mass index is 56.12 kg/m².    He is here for discussion of the upper endoscopic procedure.  He stated he has been with the disease of obesity for year(s).  He stated he suffers from obstructive sleep apnea, hypertension and a history of heartburn and morbid obesity due to his weight gain.  He stated that weight loss helps alleviate these symptoms.   He stated that he has tried multiple diet regimens including currently participating in a medically supervised weight loss program to help with weight loss.  He stated that he has attempted these conservative methods for weight loss without maintaining long term success.  Today heand myself will discuss surgical weight loss options such as the Laparoscopic Sleeve Gastrectomy or the Laparoscopic R - Y Gastric Bypass.        Review of Systems  General ROS: positive for  - fatigue and sleep disturbance  Psychological ROS: positive for - anxiety  Respiratory ROS: positive for - shortness of breath  negative for - cough, tachypnea or wheezing  Cardiovascular ROS: positive for - dyspnea on exertion and shortness of breath  negative for - edema, irregular heartbeat, palpitations or rapid heart rate  Gastrointestinal ROS: no abdominal pain, change in bowel habits, or black or bloody stools  Musculoskeletal ROS: positive for - joint pain    History  Past Medical History:   Diagnosis Date   • Hypertension    • Pain     back and joint   • Sleep apnea     c pap     Past Surgical History:   Procedure Laterality Date   • HIP SURGERY Left      Family History   Problem Relation Age of Onset   • Arthritis Mother    • Hypertension Mother    • Obesity Mother    • Arthritis Father    • Diabetes Father    • Hypertension Father    • Obesity Father    •  Arthritis Brother    • Obesity Brother    • Arthritis Maternal Grandmother    • Hypertension Maternal Grandmother    • Cancer Maternal Grandfather    • Hypertension Maternal Grandfather    • Arthritis Paternal Grandmother    • Hypertension Paternal Grandmother    • Obesity Paternal Grandmother    • Hypertension Paternal Grandfather    • Stroke Paternal Grandfather    • Obesity Paternal Grandfather      Social History     Tobacco Use   • Smoking status: Never   • Smokeless tobacco: Never   Substance Use Topics   • Alcohol use: Never     E-cigarette/Vaping     E-cigarette/Vaping Substances     (Not in a hospital admission)    Allergies:  Latex and Adhesive tape      Current Outpatient Medications:   •  coenzyme Q10 100 MG capsule, Take 1 capsule by mouth Daily., Disp: , Rfl:   •  lisinopril (PRINIVIL,ZESTRIL) 20 MG tablet, Take 20 mg by mouth Daily., Disp: , Rfl:   •  multivitamin with minerals tablet tablet, Take 1 tablet by mouth Daily., Disp: , Rfl:   •  traMADol (ULTRAM) 50 MG tablet, Take 1 tablet by mouth Every 6 (Six) Hours As Needed for Moderate Pain., Disp: , Rfl:   •  buPROPion XL (WELLBUTRIN XL) 150 MG 24 hr tablet, Take 150 mg by mouth Every Morning., Disp: , Rfl:   •  diclofenac (VOLTAREN) 75 MG EC tablet, Take 75 mg by mouth 2 (Two) Times a Day., Disp: , Rfl:     Objective     Vital Signs  Temp:  [98.2 °F (36.8 °C)] 98.2 °F (36.8 °C)  Heart Rate:  [84] 84  BP: (132)/(84) 132/84  Body mass index is 56.12 kg/m².      12/19/22 0918   Weight: (!) 188 kg (413 lb 12.8 oz)       Physical Exam:      HEENT: extra ocular movement intact and sclera clear, anicteric  Respiratory: appears well, vitals normal, no respiratory distress, acyanotic, normal RR, chest clear, no wheezing, crepitations, rhonchi, normal symmetric air entry  Cardiovascular: Regular rate and rhythm, S1, S2 normal, no murmur, click, rub or gallop  GI: Soft, non-tender, normal bowel sounds; no bruits, organomegaly or masses.  Abnormal shape:  obese  Neurologic: alert, oriented, normal speech, no focal findings or movement disorder noted       Results Review:   I reviewed the patient's new clinical results.        Assessment & Plan   Encounter Diagnoses   Name Primary?   • Class 3 severe obesity due to excess calories with serious comorbidity and body mass index (BMI) of 50.0 to 59.9 in adult (HCC) Yes   • Primary hypertension    • Obstructive sleep apnea syndrome    • History of heartburn            1.  I believe this patient will be a good candidate for weight loss surgery.  I have discussed the Munira - Y Gastric Bypass, laparoscopic sleeve gastrectomy and the Laparoscopic Gastric Band procedures.  We discussed the benefits of the surgeries including the benefit of weight loss and the possible reversal of co-morbid conditions associated with morbid obesity. I explained to the patient that prior to making a definitive decision on the type of surgery he will require an esophagogastroduodenoscopy with biopsies to assess for any contraindications for surgical weight loss.  I explained the alternatives  include not doing anything, or pursuing an UGI series which only offers a diagnosis with potential less accuracy compared to EGD, the benefits of the EGD such as identifying the pathology and anatomy of the upper GI system, and the risks and complications of the endoscopy were discussed in detail as well. I discussed the risk of perforation (one out of 3515-1387, riskier with dilation), bleeding (one out of 500), and the rare risks of infection, adverse reaction to anesthesia, respiratory failure, cardiac failure including MI and adverse reaction to medications such as allergic reactions. We discussed consequences that could occur if a risk were to develop such as the need for hospitalization, blood transfusion, surgical intervention, medications, pain, disability and death. The patient verbalizes understanding and agrees to proceed. such as bleeding,  perforation, swallowing difficulties and gas bloat can occur after this procedure.    Upon completion of our discussion and addressing and answering his questions to his satisfation, informed consent was obtained.  He will be scheduled accordingly for the esophagogastroduodenoscopy procedure.    I discussed the patients findings and my recommendations with patient.     Dr. Solitario Luo MD [facility]    12/19/22  10:45 CST  Patient Care Team:  Angelo Mckinnon APRN as PCP - General (Family Medicine)

## 2023-01-04 ENCOUNTER — TELEPHONE (OUTPATIENT)
Dept: BARIATRICS/WEIGHT MGMT | Facility: CLINIC | Age: 48
End: 2023-01-04
Payer: COMMERCIAL

## 2023-01-04 NOTE — TELEPHONE ENCOUNTER
BA-EGD       Patient was called and reminded of their procedure with Dr Luo on 01/06/2023        Instructions:     1) Eat normal the day before your procedure until 8 p.m. in the evening.    2) Beginning at 8pm clear liquids only-no Red or Pink in Color   3) Nothing to eat or drink after midnight.  4) Bring a list of medications.   5) Advised that they must bring a  as that IV sedation is being used.           The patient voiced an understanding and was agreeable.

## 2023-01-06 ENCOUNTER — HOSPITAL ENCOUNTER (OUTPATIENT)
Facility: HOSPITAL | Age: 48
Setting detail: HOSPITAL OUTPATIENT SURGERY
Discharge: HOME | End: 2023-01-06
Attending: SURGERY | Admitting: SURGERY
Payer: COMMERCIAL

## 2023-01-06 ENCOUNTER — ANESTHESIA (OUTPATIENT)
Dept: GASTROENTEROLOGY | Facility: HOSPITAL | Age: 48
End: 2023-01-06
Payer: COMMERCIAL

## 2023-01-06 ENCOUNTER — ANESTHESIA EVENT (OUTPATIENT)
Dept: GASTROENTEROLOGY | Facility: HOSPITAL | Age: 48
End: 2023-01-06
Payer: COMMERCIAL

## 2023-01-06 VITALS
WEIGHT: 315 LBS | HEIGHT: 72 IN | BODY MASS INDEX: 42.66 KG/M2 | OXYGEN SATURATION: 99 % | DIASTOLIC BLOOD PRESSURE: 81 MMHG | TEMPERATURE: 97 F | HEART RATE: 65 BPM | RESPIRATION RATE: 20 BRPM | SYSTOLIC BLOOD PRESSURE: 133 MMHG

## 2023-01-06 DIAGNOSIS — E66.01 CLASS 3 SEVERE OBESITY DUE TO EXCESS CALORIES WITH SERIOUS COMORBIDITY AND BODY MASS INDEX (BMI) OF 50.0 TO 59.9 IN ADULT: ICD-10-CM

## 2023-01-06 DIAGNOSIS — G47.33 OBSTRUCTIVE SLEEP APNEA SYNDROME: ICD-10-CM

## 2023-01-06 DIAGNOSIS — Z87.898 HISTORY OF HEARTBURN: ICD-10-CM

## 2023-01-06 DIAGNOSIS — I10 PRIMARY HYPERTENSION: ICD-10-CM

## 2023-01-06 LAB — TSH SERPL DL<=0.05 MIU/L-ACNC: 1.58 UIU/ML (ref 0.27–4.2)

## 2023-01-06 PROCEDURE — 84443 ASSAY THYROID STIM HORMONE: CPT | Performed by: ANESTHESIOLOGY

## 2023-01-06 PROCEDURE — 25010000002 PROPOFOL 10 MG/ML EMULSION: Performed by: NURSE ANESTHETIST, CERTIFIED REGISTERED

## 2023-01-06 PROCEDURE — 87081 CULTURE SCREEN ONLY: CPT | Performed by: SURGERY

## 2023-01-06 RX ORDER — PROPOFOL 10 MG/ML
VIAL (ML) INTRAVENOUS AS NEEDED
Status: DISCONTINUED | OUTPATIENT
Start: 2023-01-06 | End: 2023-01-06 | Stop reason: SURG

## 2023-01-06 RX ORDER — SODIUM CHLORIDE 0.9 % (FLUSH) 0.9 %
10 SYRINGE (ML) INJECTION AS NEEDED
Status: DISCONTINUED | OUTPATIENT
Start: 2023-01-06 | End: 2023-01-06 | Stop reason: HOSPADM

## 2023-01-06 RX ORDER — SODIUM CHLORIDE 9 MG/ML
500 INJECTION, SOLUTION INTRAVENOUS CONTINUOUS PRN
Status: DISCONTINUED | OUTPATIENT
Start: 2023-01-06 | End: 2023-01-06 | Stop reason: HOSPADM

## 2023-01-06 RX ORDER — LIDOCAINE HYDROCHLORIDE 20 MG/ML
INJECTION, SOLUTION EPIDURAL; INFILTRATION; INTRACAUDAL; PERINEURAL AS NEEDED
Status: DISCONTINUED | OUTPATIENT
Start: 2023-01-06 | End: 2023-01-06 | Stop reason: SURG

## 2023-01-06 RX ADMIN — PROPOFOL 200 MG: 10 INJECTION, EMULSION INTRAVENOUS at 08:32

## 2023-01-06 RX ADMIN — SODIUM CHLORIDE 500 ML: 9 INJECTION, SOLUTION INTRAVENOUS at 07:18

## 2023-01-06 RX ADMIN — LIDOCAINE HYDROCHLORIDE 150 MG: 20 INJECTION, SOLUTION EPIDURAL; INFILTRATION; INTRACAUDAL; PERINEURAL at 08:32

## 2023-01-06 NOTE — ANESTHESIA POSTPROCEDURE EVALUATION
Patient: Santiago Mcgraw    Procedure Summary     Date: 01/06/23 Room / Location: [facility] ENDOSCOPY 4 / [facility] ENDOSCOPY    Anesthesia Start: 0829 Anesthesia Stop: 0839    Procedure: ESOPHAGOGASTRODUODENOSCOPY WITH ANESTHESIA Diagnosis:       Class 3 severe obesity due to excess calories with serious comorbidity and body mass index (BMI) of 50.0 to 59.9 in adult ([facility])      Primary hypertension      Obstructive sleep apnea syndrome      History of heartburn      (Class 3 severe obesity due to excess calories with serious comorbidity and body mass index (BMI) of 50.0 to 59.9 in adult ([facility]) [E66.01, Z68.43])      (Primary hypertension [I10])      (Obstructive sleep apnea syndrome [G47.33])      (History of heartburn [Z87.898])    Surgeons: Solitario Luo MD Provider: Rebekah Galvan CRNA    Anesthesia Type: MAC ASA Status: 3          Anesthesia Type: MAC    Vitals  Vitals Value Taken Time   /80 01/06/23 0846   Temp     Pulse 69 01/06/23 0851   Resp 21 01/06/23 0840   SpO2 99 % 01/06/23 0851   Vitals shown include unvalidated device data.        Post Anesthesia Care and Evaluation    Patient location during evaluation: PHASE II  Patient participation: complete - patient participated  Level of consciousness: awake and alert  Pain management: adequate    Airway patency: patent  Anesthetic complications: No anesthetic complications  PONV Status: none  Cardiovascular status: acceptable  Respiratory status: acceptable  Hydration status: acceptable

## 2023-01-06 NOTE — BRIEF OP NOTE
ESOPHAGOGASTRODUODENOSCOPY WITH ANESTHESIA  Progress Note    Santiago K Mariluz  1/6/2023    Pre-op Diagnosis:   Class 3 severe obesity due to excess calories with serious comorbidity and body mass index (BMI) of 50.0 to 59.9 in adult ([facility]) [E66.01, Z68.43]  Primary hypertension [I10]  Obstructive sleep apnea syndrome [G47.33]  History of heartburn [Z87.898]       Post-Op Diagnosis Codes:     * Class 3 severe obesity due to excess calories with serious comorbidity and body mass index (BMI) of 50.0 to 59.9 in adult ([facility]) [E66.01, Z68.43]     * Primary hypertension [I10]     * Obstructive sleep apnea syndrome [G47.33]     * History of heartburn [Z87.898]    Procedure/CPT® Codes:        Procedure(s):  ESOPHAGOGASTRODUODENOSCOPY WITH ANESTHESIA        Surgeon(s):  Solitario Luo MD    Anesthesia: Sedation    Staff:   Endo Technician: Parmer, Michael D  Endo Nurse: Nikkie Hernandez RN         Estimated Blood Loss: minimal    Urine Voided: * No values recorded between 1/6/2023  8:30 AM and 1/6/2023  8:36 AM *    Specimens:                Specimens     ID Source Type Tests Collected By Collected At Frozen?    1 Stomach Tissue · UREASE FOR H PYLORI, 24 HR   Solitario Luo MD 1/6/23 0835              Findings: wnl        Complications: none          Solitario Luo MD     Date: 1/6/2023  Time: 08:37 CST

## 2023-01-06 NOTE — ANESTHESIA PREPROCEDURE EVALUATION
Anesthesia Evaluation     Patient summary reviewed   no history of anesthetic complications:  NPO Solid Status: > 8 hours  NPO Liquid Status: > 8 hours           Airway   Mallampati: III  Large neck circumference and Possible difficult intubation  Dental - normal exam     Pulmonary    (+) sleep apnea on CPAP,   (-) asthma, not a smoker  Cardiovascular   Exercise tolerance: excellent (>7 METS)    (+) hypertension,       Neuro/Psych  (-) seizures, TIA, CVA  GI/Hepatic/Renal/Endo    (+) morbid obesity,      Musculoskeletal     Abdominal    Substance History      OB/GYN          Other                        Anesthesia Plan    ASA 3     MAC     intravenous induction     Anesthetic plan, risks, benefits, and alternatives have been provided, discussed and informed consent has been obtained with: patient.        CODE STATUS:

## 2023-01-07 LAB — UREASE TISS QL: NEGATIVE

## 2023-01-26 ENCOUNTER — OFFICE VISIT (OUTPATIENT)
Dept: BARIATRICS/WEIGHT MGMT | Facility: CLINIC | Age: 48
End: 2023-01-26
Payer: COMMERCIAL

## 2023-01-26 VITALS
WEIGHT: 315 LBS | HEIGHT: 72 IN | OXYGEN SATURATION: 98 % | SYSTOLIC BLOOD PRESSURE: 163 MMHG | HEART RATE: 70 BPM | BODY MASS INDEX: 42.66 KG/M2 | DIASTOLIC BLOOD PRESSURE: 93 MMHG | TEMPERATURE: 98.4 F

## 2023-01-26 DIAGNOSIS — G47.33 OBSTRUCTIVE SLEEP APNEA SYNDROME: ICD-10-CM

## 2023-01-26 DIAGNOSIS — E66.01 CLASS 3 SEVERE OBESITY DUE TO EXCESS CALORIES WITH SERIOUS COMORBIDITY AND BODY MASS INDEX (BMI) OF 50.0 TO 59.9 IN ADULT: Primary | ICD-10-CM

## 2023-01-26 DIAGNOSIS — I10 PRIMARY HYPERTENSION: ICD-10-CM

## 2023-01-26 PROCEDURE — 99213 OFFICE O/P EST LOW 20 MIN: CPT | Performed by: NURSE PRACTITIONER

## 2023-01-26 NOTE — ASSESSMENT & PLAN NOTE
Patient's (Body mass index is 55.36 kg/m².) indicates that they are morbidly/severely obese (BMI > 40 or > 35 with obesity - related health condition) with health conditions that include obstructive sleep apnea and hypertension . Weight is improving with treatment. BMI is is above average; BMI management plan is completed. We discussed portion control and increasing exercise.

## 2023-01-26 NOTE — PROGRESS NOTES
"Patient Care Team:  Angelo Mckinnon APRN as PCP - General (Family Medicine)  Francie Cabrera APRN as Nurse Practitioner (Nurse Practitioner)    Reason for Visit:  Surgical Weight loss, V4    Subjective   Santiago Mcgraw is a 47 y.o. male.     Santiago is here for follow-up and continued medical management of his morbid obesity.  He is currently on a prescription diet.  Santiago previously was to apply dietary changes such as following the meal plan as directed.  He admits to eating 4 meals per day.  As a result he lost weight since his last visit.    He has lost 5 lbs since his last appointment with us.     Review Of Systems:  Review of Systems   Constitutional: Negative.    Respiratory: Negative.    Cardiovascular: Negative.    Gastrointestinal: Negative.    Endocrine: Negative.    Musculoskeletal: Negative.    Psychiatric/Behavioral: Negative.          The following portions of the patient's history were reviewed and updated as appropriate: allergies, current medications, past family history, past medical history, past social history, past surgical history, and problem list.    Objective   /93 (BP Location: Right arm, Patient Position: Sitting, Cuff Size: Adult)   Pulse 70   Temp 98.4 °F (36.9 °C)   Ht 182.9 cm (72\")   Wt (!) 185 kg (408 lb 3.2 oz)   SpO2 98%   BMI 55.36 kg/m²       01/26/23  1446   Weight: (!) 185 kg (408 lb 3.2 oz)       Physical Exam  Vitals reviewed.   Constitutional:       Appearance: He is obese.   Cardiovascular:      Rate and Rhythm: Normal rate and regular rhythm.   Pulmonary:      Effort: Pulmonary effort is normal.   Abdominal:      General: Bowel sounds are normal.      Palpations: Abdomen is soft.   Musculoskeletal:         General: Normal range of motion.   Skin:     General: Skin is warm and dry.   Neurological:      Mental Status: He is alert and oriented to person, place, and time.   Psychiatric:         Mood and Affect: Mood normal.         Behavior: Behavior " normal.         Class 3 Severe Obesity (BMI >=40). Obesity-related health conditions include the following: obstructive sleep apnea and hypertension. Obesity is improving with treatment. BMI is is above average; BMI management plan is completed. We discussed portion control and increasing exercise.     Urease For H Pylori - Tissue, Stomach (01/06/2023 08:35)  TSH (01/06/2023 07:14)  UPPER GI ENDOSCOPY (01/06/2023 07:34)  ECG 12 Lead (11/11/2022 11:24)      Assessment & Plan   Diagnoses and all orders for this visit:    1. Class 3 severe obesity due to excess calories with serious comorbidity and body mass index (BMI) of 50.0 to 59.9 in adult (HCC) (Primary)  Assessment & Plan:  Patient's (Body mass index is 55.36 kg/m².) indicates that they are morbidly/severely obese (BMI > 40 or > 35 with obesity - related health condition) with health conditions that include obstructive sleep apnea and hypertension . Weight is improving with treatment. BMI is is above average; BMI management plan is completed. We discussed portion control and increasing exercise.       2. Primary hypertension  Comments:  Continue current regimen, BP elevated today.  Advised to check at home and f/u with PCP as needed     3. Obstructive sleep apnea syndrome  Overview:  teto Mcgraw was seen today for follow-up, obesity, nutrition counseling and weight loss.  He has lost weight since his last visit.  Today we discussed healthy changes in lifestyle, diet, and exercise. Dietician consultation obtained.  Santiago Mcgraw had received handouts to him explaining the recommendation on portion sizes/appetite control/reading nutrition labels.   Intensive behavioral therapy for obesity was done today as well.     Goals for this month are:   1. Waiting on letter from psych, he states he sees a provider from Dr Blanco's office.   2. Advised to check meal plan before eating each day.     Follow up in one month for a weight recheck.A total of 20  minutes was spent face to face with this patient and over half of the time was spent on counseling and coordination of care for the disease of obesity. We specifically reviewed the dietary prescription and I made recommendations toward increasing exercise as tolerated as well as focusing on training their behavior toward storing less.

## 2023-02-06 ENCOUNTER — OFFICE VISIT (OUTPATIENT)
Dept: BARIATRICS/WEIGHT MGMT | Facility: CLINIC | Age: 48
End: 2023-02-06
Payer: COMMERCIAL

## 2023-02-06 VITALS
DIASTOLIC BLOOD PRESSURE: 84 MMHG | SYSTOLIC BLOOD PRESSURE: 145 MMHG | HEIGHT: 72 IN | OXYGEN SATURATION: 99 % | WEIGHT: 315 LBS | TEMPERATURE: 98.2 F | HEART RATE: 76 BPM | BODY MASS INDEX: 42.66 KG/M2

## 2023-02-06 DIAGNOSIS — G47.33 OBSTRUCTIVE SLEEP APNEA SYNDROME: ICD-10-CM

## 2023-02-06 DIAGNOSIS — I10 PRIMARY HYPERTENSION: ICD-10-CM

## 2023-02-06 DIAGNOSIS — E66.01 CLASS 3 SEVERE OBESITY DUE TO EXCESS CALORIES WITH SERIOUS COMORBIDITY AND BODY MASS INDEX (BMI) OF 50.0 TO 59.9 IN ADULT: Primary | ICD-10-CM

## 2023-02-06 PROCEDURE — 99214 OFFICE O/P EST MOD 30 MIN: CPT | Performed by: SURGERY

## 2023-02-06 RX ORDER — SCOLOPAMINE TRANSDERMAL SYSTEM 1 MG/1
1 PATCH, EXTENDED RELEASE TRANSDERMAL CONTINUOUS
Status: CANCELLED | OUTPATIENT
Start: 2023-02-06 | End: 2023-02-09

## 2023-02-06 RX ORDER — GABAPENTIN 250 MG/5ML
250 SOLUTION ORAL ONCE
Status: CANCELLED | OUTPATIENT
Start: 2023-02-06 | End: 2023-02-06

## 2023-02-06 RX ORDER — ONDANSETRON 2 MG/ML
4 INJECTION INTRAMUSCULAR; INTRAVENOUS EVERY 6 HOURS PRN
Status: CANCELLED | OUTPATIENT
Start: 2023-02-06

## 2023-02-06 RX ORDER — ENOXAPARIN SODIUM 150 MG/ML
40 INJECTION SUBCUTANEOUS ONCE
Status: CANCELLED | OUTPATIENT
Start: 2023-02-06 | End: 2023-02-06

## 2023-02-06 NOTE — PROGRESS NOTES
Patient Care Team:  Angelo Mckinnon APRN as PCP - General (Family Medicine)  Francie Cabrera APRN as Nurse Practitioner (Nurse Practitioner)    Reason for Visit:  Surgical Weight loss    Subjective      Santiago Mcgraw is a pleasant 47 y.o. male and presents with morbid obesity with his Body mass index is 54.9 kg/m².    He is here for discussion of weight loss options.  He stated he has been with the disease of obesity for year(s).  He stated he suffers from hypertension, sleep apnea and morbid obesity due to his weight gain.  He stated that weight loss helps alleviate these symptoms.   He stated that he has tried multiple diet regimens including completing a medically supervised weight loss program to help with weight loss.  He stated that he has attempted these conservative methods for weight loss without maintaining long term success.  Today he would like to discuss surgical weight loss options such as the Laparoscopic Sleeve Gastrectomy or the Laparoscopic R - Y Gastric Bypass.      Review of Systems  General ROS: negative  Psychological ROS: negative  Respiratory ROS: positive for - cough  negative for - hemoptysis, shortness of breath, tachypnea or wheezing  Cardiovascular ROS: no chest pain or dyspnea on exertion  Gastrointestinal ROS: no abdominal pain, change in bowel habits, or black or bloody stools  positive for - constipation  Musculoskeletal ROS: positive for - joint pain    History  Past Medical History:   Diagnosis Date   • Hypertension    • Pain     back and joint   • Sleep apnea     c pap     Past Surgical History:   Procedure Laterality Date   • ENDOSCOPY N/A 1/6/2023    Procedure: ESOPHAGOGASTRODUODENOSCOPY WITH ANESTHESIA;  Surgeon: Solitario Luo MD;  Location: [facility] ENDOSCOPY;  Service: General;  Laterality: N/A;  pre morbid obesity  post  miriam ritter   • HIP SURGERY Left      Family History   Problem Relation Age of Onset   • Arthritis Mother    • Hypertension  Mother    • Obesity Mother    • Arthritis Father    • Diabetes Father    • Hypertension Father    • Obesity Father    • Arthritis Brother    • Obesity Brother    • Arthritis Maternal Grandmother    • Hypertension Maternal Grandmother    • Cancer Maternal Grandfather    • Hypertension Maternal Grandfather    • Arthritis Paternal Grandmother    • Hypertension Paternal Grandmother    • Obesity Paternal Grandmother    • Hypertension Paternal Grandfather    • Stroke Paternal Grandfather    • Obesity Paternal Grandfather      Social History     Tobacco Use   • Smoking status: Never   • Smokeless tobacco: Never   Substance Use Topics   • Alcohol use: Never     E-cigarette/Vaping     E-cigarette/Vaping Substances     (Not in a hospital admission)    Allergies:  Latex and Adhesive tape      Current Outpatient Medications:   •  buPROPion XL (WELLBUTRIN XL) 150 MG 24 hr tablet, Take 150 mg by mouth Every Morning., Disp: , Rfl:   •  coenzyme Q10 100 MG capsule, Take 1 capsule by mouth Daily., Disp: , Rfl:   •  lisinopril (PRINIVIL,ZESTRIL) 20 MG tablet, Take 20 mg by mouth Daily., Disp: , Rfl:   •  multivitamin with minerals tablet tablet, Take 1 tablet by mouth Daily., Disp: , Rfl:   •  traMADol (ULTRAM) 50 MG tablet, Take 1 tablet by mouth Every 6 (Six) Hours As Needed for Moderate Pain., Disp: , Rfl:     Objective     Vital Signs  Temp:  [98.2 °F (36.8 °C)] 98.2 °F (36.8 °C)  Heart Rate:  [76] 76  BP: (145)/(84) 145/84  Body mass index is 54.9 kg/m².      02/06/23  1033   Weight: (!) 184 kg (404 lb 12.8 oz)       General Appearance:  awake, alert, oriented, in no acute distress  Lungs:  Normal expansion.  Clear to auscultation.  No rales, rhonchi, or wheezing.  Heart:  Heart regular rate and rhythm  Abdomen:  Soft, non-tender, normal bowel sounds; no bruits, organomegaly or masses.  Abnormal shape: obese  Extremities: Extremities warm to touch, pink, with no edema.      Results Review:   I reviewed the patient's new clinical  results.        Assessment & Plan   Encounter Diagnoses   Name Primary?   • Class 3 severe obesity due to excess calories with serious comorbidity and body mass index (BMI) of 50.0 to 59.9 in adult (HCC) Yes   • Primary hypertension    • Obstructive sleep apnea syndrome        I believe this patient will be a good candidate for weight loss surgery.    He has chosen laparoscopic sleeve gastrectomy. I agree with this decision.  I have discussed the Munira - Y Gastric Bypass, laparoscopic sleeve gastrectomy and the Laparoscopic Gastric Band procedures to provide the alternatives which includes non surgical weight loss options as well.  We discussed the benefits of the surgeries including the benefit of weight loss and the possible reversal of co-morbid conditions associated with morbid obesity.  He is aware that the Sleeve procedure is not reversible.  We discussed the complications and risks which include the risk of perforation, leakage,bleeding, intra-abdominal organ injury, specifically at high risks of the liver and spleen, stenosis or ulcerations, the risk of venous thrombosis formation in the lungs, mesentery veins or lower extremities  leading to possible organ injury and death.  I explained to the patient that there is a risk of infection.  I explained to him the possibility that this procedure may not be performed laparoscopically and may require being converted to an opened procedure or aborted due to abnormal anatomy.  Also postoperatively there is a risk of increased GERD symptoms after this procedure.  I have explained if he develops intestinal metaplasia of his esophagus consideration for conversion to another weight loss procedure may be necessary.    I have explained to the patient that depression, addictive behaviors and even an increase in suicidal emotions can occur after surgery and even though they have undergone a mental health evaluation through psychology/psychiatry or by a registered therapist  he may require additional evaluation and treatment in the future.  Nicotine cessation needs to continue even after their surgical procedure and nicotine products should be avoided due to the side effects of these products.      I have reviewed with the patient his 30-day mortality risk using the ACS Surgical Risk Calculator to be less than 0.1%.    I explained to the patient that the success of the surgery is directly related to the motivation and dedication to behavioral changes that they have learned during their preoperative course.  There is data that shows approximately 10% of patients may have satisfactory weight loss or regain their weight they have lost after surgery.  It is more likely due to returning to the previous behaviors they incorporated during their disease of obesity.I also encourage Santiago Mcgraw to incorporate exercise again after surgery weight restrictions have been removed postoperatively.    Perla Report   As part of this patient's treatment plan I am prescribing controlled substances.  We review Perla in our educational course.  The patient has been made aware of appropriate use of such medications, including potential risk of somnolence, limited ability to drive and /or work safely, and potential for dependence or overdose. It has also been made clear that these medications are for use by this patient only, without concomitant use of alcohol or other substances unless prescribed.    Santiago Mcgraw will be required to complete the educational preoperative class detailing terms of the preoperative and postoperative recommendations, risks of surgery, the monitoring of the patient's PERLA reports if necessary. Santiago Mcgraw is aware that inappropriate use of prescribed medications will result in cessation of prescribing such medications.    PERLA report has been reviewed.      History and physical exam exhibit continued safe and appropriate use of controlled substances.       Santiago Mcgraw  understands the surgical procedures and the different surgical options that are available.   Santiago Mcgraw understands the lifestyle changes that are required after surgery and has agreed to follow the guidelines outlined in the weight management program. Santiago Mcgraw also expressed understanding of the risks involved and had all of his questions answered and desires to proceed.    Upon completion of our discussion and addressing and answering his questions to his satisfaction, informed consent was obtained.  The patient has completed his psychological evaluation of note.  He will be scheduled accordingly for a laparoscopic Sleeve gastrectomy procedure.      I discussed the patient's findings and my recommendations with patient.   I have also recommended that he obtain completion of his preoperative laboratory work, educational course and preoperative diet regimen prior to surgery consideration.      Dr. Solitario Luo MD FACS    02/06/23  12:13 CST  Patient Care Team:  Angelo Mckinnon APRN as PCP - General (Family Medicine)  Francie Cabrera APRN as Nurse Practitioner (Nurse Practitioner)

## 2023-02-14 NOTE — DISCHARGE INSTRUCTIONS
DAY OF SURGERY INSTRUCTIONS        ARRIVAL TIME: AS DIRECTED BY OFFICE      YOU MAY TAKE THE FOLLOWING MEDICATION(S) THE MORNING OF SURGERY WITH A SIP OF WATER: as directed by office    DO NOT take lisinopril 24 hours prior to surgery      ALL OTHER HOME MEDICATIONS CHECK WITH YOUR DOCTOR (especially if you are taking diabetes medicines or blood thinners)    Do not take any Erectile Dysfunction medications  (EX: CIALIS, VIAGRA) 24 hours prior to surgery                                                                                BEFORE YOU COME TO THE HOSPITAL                                                                               (Pre-op instructions)    Do not eat, drink, chew gum after 8 p.m. the night before surgery.  This also includes no mints.    Morning of surgery take only the medicines you have been instructed.        Drink only 8 ounces of sugar free Gatorade, G2, Powerade Zero         (no red or pink) as instructed.    Use Chlorhexidine gluconate (CHG), a germ-killing solution, as directed the night before surgery and again the morning of surgery. (Use half of the bottle with each shower.) See attached information for How to Use Chlorhexidine for Bathing.                                                                  MANAGING PAIN AFTER SURGERY    We know you are probably wondering what your pain will be like after surgery.  Following surgery it is unrealistic to expect you will not have pain.   Pain is how our bodies let us know that something is wrong or cautions us to be careful.  That said, our goal is to make your pain tolerable.    Methods we may use to treat your pain include (oral or IV medications, PCAs, epidurals, nerve blocks, etc.)   While some procedures require IV pain medications for a short time after surgery, transitioning to pain medications by mouth allows for better management of pain.   Your nurse will encourage you to take oral pain medications whenever possible.  IV  medications work almost immediately, but only last a short while.  Taking medications by mouth allows for a more constant level of medication in your blood stream for a longer period of time.      Once your pain is out of control it is harder to get back under control.  It is important you are aware when your next dose of pain medication is due.  If you are admitted, your nurse may write the time of your next dose on the white board in your room to help you remember.      We are interested in your pain and encourage you to inform us about aggravating factors during your visit.   Many times a simple repositioning every few hours can make a big difference.    If your physician says it is okay, do not let your pain prevent you from getting out of bed. Be sure to call your nurse for assistance prior to getting up so you do not fall.      Before surgery, please decide your tolerable pain goal.  These faces help describe the pain ratings we use on a 0-10 scale.   Be prepared to tell us your goal and whether or not you take pain or anxiety medications at home.      Preparing for Surgery  Preparing for surgery is an important part of your care. It can make things go more smoothly and help you avoid complications. The steps leading up to surgery may vary among hospitals. Follow all instructions given to you by your health care providers. Ask questions if you do not understand something. Talk about any concerns that you have.  Here are some questions to consider asking before your surgery:  If my surgery is not an emergency (is elective), when would be the best time to have the surgery?  What arrangements do I need to make for work, home, or school?  What will my recovery be like? How long will it be before I can return to normal activities?  Will I need to prepare my home? Will I need to arrange care for me or my children?  Should I expect to have pain after surgery? What are my pain management options? Are there nonmedical  options that I can try for pain?  Tell a health care provider about:  Any allergies you have.  All medicines you are taking, including vitamins, herbs, eye drops, creams, and over-the-counter medicines.  Any problems you or family members have had with anesthetic medicines.  Any blood disorders you have.  Any surgeries you have had.  Any medical conditions you have.  Whether you are pregnant or may be pregnant.  What are the risks?  The risks and complications of surgery depend on the specific procedure that you have. Discuss all the risks with your health care providers before your surgery. Ask about common surgical complications, which may include:  Infection.  Bleeding or a need for blood replacement (transfusion).  Allergic reactions to medicines.  Damage to surrounding nerves, tissues, or structures.  A blood clot.  Scarring.  Failure of the surgery to correct the problem.  Follow these instructions before the procedure:  Several days or weeks before your procedure  You may have a physical exam by your primary health care provider to make sure it is safe for you to have surgery.  You may have testing. This may include a chest X-ray, blood and urine tests, electrocardiogram (ECG), or other testing.  Ask your health care provider about:  Changing or stopping your regular medicines. This is especially important if you are taking diabetes medicines or blood thinners.  Taking medicines such as aspirin and ibuprofen. These medicines can thin your blood. Do not take these medicines unless your health care provider tells you to take them.  Taking over-the-counter medicines, vitamins, herbs, and supplements.  Do not use any products that contain nicotine or tobacco, such as cigarettes and e-cigarettes. If you need help quitting, ask your health care provider.  Avoid alcohol.   Ask your health care provider if there are exercises you can do to prepare for surgery.  Eat a healthy diet. Follow Pre-Op diet.   Plan to have  someone take you home from the hospital or clinic.  Plan to have a responsible adult care for you for at least 24 hours after you leave the hospital or clinic. This is important.  The day before your procedure  You may be given antibiotic medicine to take by mouth to help prevent infection. Take it as told by your health care provider.  You may be asked to shower with a germ-killing soap.  Follow instructions from your health care provider about eating and drinking restrictions.  The day of your procedure  You may need to take another shower with a germ-killing soap before you leave home in the morning.  With a small sip of water, take only the medicines that you are told to take.  Do not wear any makeup, nail polish, powder, deodorant, lotion, jewelry, hair accessories, or anything on your skin or body except your clothes.  If you will be staying in the hospital, bring a case to hold your glasses, contacts, or dentures. You may also want to bring your robe and non-skid footwear.  If instructed by your health care provider, bring your sleep apnea device with you on the day of your surgery (if this applies to you).  Arrive at the hospital as scheduled.  Bring a friend or family member with you who can help to answer questions and be present while you meet with your health care provider.  At the hospital  When you arrive at the hospital:  Go to registration located at the main entrance of the hospital. You will be registered and given a beeper and a sticker sheet. Take the stickers to the Outpatient desk to notify staff that you have arrived then wait in the main lobby.   When your beeper lights up and vibrates a member of the Outpatient Surgery staff will meet you at the double doors under the stair steps and escort you to your preoperative room.  You may have cloth compression devices placed on your legs. These help to prevent blood clots and reduce swelling in your legs.  An IV may be inserted into one of your  veins.  In the operating room, you may be given one or more of the following:  A medicine to help you relax (sedative).  A medicine to numb the area (local anesthetic).  A medicine to make you fall asleep (general anesthetic).  A medicine that is injected into an area of your body to numb everything below the injection site (regional anesthetic).  Your surgical site will be marked or identified.  You may be given an antibiotic through your IV to help prevent infection.  Contact a health care provider if you:  Develop a fever of more than 100.4°F (38°C) or other feelings of illness during the 48 hours before your surgery.  Have symptoms that get worse.  Have questions or concerns about your surgery.  Summary  Preparing for surgery can make the procedure go more smoothly and lower your risk of complications.  Before surgery, make a list of questions and concerns to discuss with your surgeon. Ask about the risks and possible complications.  In the days or weeks before your surgery, follow all instructions from your health care provider. You may need to stop smoking, avoid alcohol, follow eating restrictions, and change or stop your regular medicines.  Contact your surgeon if you develop a fever or other signs of illness during the few days before your surgery.  This information is not intended to replace advice given to you by your health care provider. Make sure you discuss any questions you have with your health care provider.  Document Revised: 12/21/2018 Document Reviewed: 10/23/2018  Elsevier Patient Education © 2021 [employer]

## 2023-02-17 ENCOUNTER — TREATMENT (OUTPATIENT)
Dept: BARIATRICS/WEIGHT MGMT | Facility: CLINIC | Age: 48
End: 2023-02-17
Payer: COMMERCIAL

## 2023-02-17 ENCOUNTER — PRE-ADMISSION TESTING (OUTPATIENT)
Dept: PREADMISSION TESTING | Facility: HOSPITAL | Age: 48
End: 2023-02-17
Payer: COMMERCIAL

## 2023-02-17 ENCOUNTER — LAB (OUTPATIENT)
Dept: LAB | Facility: HOSPITAL | Age: 48
End: 2023-02-17
Payer: COMMERCIAL

## 2023-02-17 VITALS — BODY MASS INDEX: 42.66 KG/M2 | HEIGHT: 72 IN | WEIGHT: 315 LBS

## 2023-02-17 DIAGNOSIS — E66.01 CLASS 3 SEVERE OBESITY DUE TO EXCESS CALORIES WITH SERIOUS COMORBIDITY AND BODY MASS INDEX (BMI) OF 50.0 TO 59.9 IN ADULT: ICD-10-CM

## 2023-02-17 LAB
ALBUMIN SERPL-MCNC: 4.3 G/DL (ref 3.5–5.2)
ALBUMIN/GLOB SERPL: 1.6 G/DL
ALP SERPL-CCNC: 95 U/L (ref 39–117)
ALT SERPL W P-5'-P-CCNC: 15 U/L (ref 1–41)
ANION GAP SERPL CALCULATED.3IONS-SCNC: 12 MMOL/L (ref 5–15)
APTT PPP: 41 SECONDS (ref 24.1–35)
AST SERPL-CCNC: 13 U/L (ref 1–40)
BILIRUB SERPL-MCNC: 0.6 MG/DL (ref 0–1.2)
BILIRUB UR QL STRIP: NEGATIVE
BUN SERPL-MCNC: 12 MG/DL (ref 6–20)
BUN/CREAT SERPL: 14.8 (ref 7–25)
CALCIUM SPEC-SCNC: 9 MG/DL (ref 8.6–10.5)
CHLORIDE SERPL-SCNC: 104 MMOL/L (ref 98–107)
CLARITY UR: ABNORMAL
CO2 SERPL-SCNC: 26 MMOL/L (ref 22–29)
COLOR UR: YELLOW
CREAT SERPL-MCNC: 0.81 MG/DL (ref 0.76–1.27)
DEPRECATED RDW RBC AUTO: 41.3 FL (ref 37–54)
EGFRCR SERPLBLD CKD-EPI 2021: 108.8 ML/MIN/1.73
ERYTHROCYTE [DISTWIDTH] IN BLOOD BY AUTOMATED COUNT: 14.2 % (ref 12.3–15.4)
GLOBULIN UR ELPH-MCNC: 2.7 GM/DL
GLUCOSE SERPL-MCNC: 88 MG/DL (ref 65–99)
GLUCOSE UR STRIP-MCNC: NEGATIVE MG/DL
HCT VFR BLD AUTO: 43.3 % (ref 37.5–51)
HGB BLD-MCNC: 14.4 G/DL (ref 13–17.7)
HGB UR QL STRIP.AUTO: ABNORMAL
INR PPP: 1.09 (ref 0.91–1.09)
KETONES UR QL STRIP: ABNORMAL
LEUKOCYTE ESTERASE UR QL STRIP.AUTO: ABNORMAL
MCH RBC QN AUTO: 27.1 PG (ref 26.6–33)
MCHC RBC AUTO-ENTMCNC: 33.3 G/DL (ref 31.5–35.7)
MCV RBC AUTO: 81.4 FL (ref 79–97)
NITRITE UR QL STRIP: NEGATIVE
PH UR STRIP.AUTO: 6 [PH] (ref 5–8)
PLATELET # BLD AUTO: 242 10*3/MM3 (ref 140–450)
PMV BLD AUTO: 10.7 FL (ref 6–12)
POTASSIUM SERPL-SCNC: 3.9 MMOL/L (ref 3.5–5.2)
PROT SERPL-MCNC: 7 G/DL (ref 6–8.5)
PROT UR QL STRIP: NEGATIVE
PROTHROMBIN TIME: 14.2 SECONDS (ref 11.8–14.8)
RBC # BLD AUTO: 5.32 10*6/MM3 (ref 4.14–5.8)
SODIUM SERPL-SCNC: 142 MMOL/L (ref 136–145)
SP GR UR STRIP: 1.01 (ref 1–1.03)
UROBILINOGEN UR QL STRIP: ABNORMAL
WBC NRBC COR # BLD: 11.12 10*3/MM3 (ref 3.4–10.8)

## 2023-02-17 PROCEDURE — 81003 URINALYSIS AUTO W/O SCOPE: CPT

## 2023-02-17 PROCEDURE — 80053 COMPREHEN METABOLIC PANEL: CPT

## 2023-02-17 PROCEDURE — 36415 COLL VENOUS BLD VENIPUNCTURE: CPT

## 2023-02-17 PROCEDURE — 85610 PROTHROMBIN TIME: CPT

## 2023-02-17 PROCEDURE — 85027 COMPLETE CBC AUTOMATED: CPT

## 2023-02-17 PROCEDURE — 85730 THROMBOPLASTIN TIME PARTIAL: CPT

## 2023-02-17 NOTE — PROGRESS NOTES
Pre Sx Education Class   Information and Education Class for Pre and Post     Surgery Care, Diets and Daily Living.       Surgery Type: Sleeve Gastrectomy Consent on File    Height: 6'0  Weight: 400.8  BMI: 54.36      Diet Stages Form given including diet stages and surgery date/time     Weight Loss Surgery Patient Contract on File      Patient has signed/agreed with the Diet Stage & Medication discontinue sheet:       1) Medications have been review by Dr Luo.      2) Patient informed to stop NSAID'S one week prior to surgery.                         If the patient is taking Metformin he/she will need to discontinue                   two weeks prior to surgery.                      Birth control Medications are to be discontinued two weeks prior                  and restart four weeks post surgery.                     They have also been instructed not to take the following medications the morning    of their procedure on the Bariatric Surgery Instructions Sheet:     3) Dr uLo/Surgeon recommends that this patient take the following two        hours prior to their arrival time: prinivil, coenzyme q10, wellbutrin         A.     Medications as directed by surgery staff at Pre Sx Class    B.     2 extra strength Tylenol (1000mg)                           C.     8 ounces (Only) of Sugar Free Gatorade, G2 or Powerade Zero                                   (no red or pink in color)                             4) During Bootcamp our patient also met with the Outpatient Surgery Staff  for pre-surgery instructions.          Patient also received [facility] Surgery Post Follow up Appointments.

## 2023-02-20 ENCOUNTER — TELEPHONE (OUTPATIENT)
Dept: BARIATRICS/WEIGHT MGMT | Facility: CLINIC | Age: 48
End: 2023-02-20
Payer: COMMERCIAL

## 2023-02-20 NOTE — TELEPHONE ENCOUNTER
Left a message with the patient to call with any additional questions.  I recommended that he increase his fluid intake as well as back off on excessive proteins exceeding the recommended regimen provided for his preoperative diet regimen.  He is to call if he has any symptoms of burning with urination.

## 2023-02-23 ENCOUNTER — ANESTHESIA EVENT (OUTPATIENT)
Dept: PERIOP | Facility: HOSPITAL | Age: 48
DRG: 621 | End: 2023-02-23
Payer: COMMERCIAL

## 2023-02-23 ENCOUNTER — ANESTHESIA (OUTPATIENT)
Dept: PERIOP | Facility: HOSPITAL | Age: 48
DRG: 621 | End: 2023-02-23
Payer: COMMERCIAL

## 2023-02-23 ENCOUNTER — HOSPITAL ENCOUNTER (INPATIENT)
Facility: HOSPITAL | Age: 48
LOS: 1 days | Discharge: HOME | DRG: 621 | End: 2023-02-24
Attending: SURGERY | Admitting: SURGERY
Payer: COMMERCIAL

## 2023-02-23 DIAGNOSIS — Z87.898 HISTORY OF HEARTBURN: ICD-10-CM

## 2023-02-23 DIAGNOSIS — G47.33 OBSTRUCTIVE SLEEP APNEA SYNDROME: ICD-10-CM

## 2023-02-23 DIAGNOSIS — E66.01 CLASS 3 SEVERE OBESITY DUE TO EXCESS CALORIES WITH SERIOUS COMORBIDITY AND BODY MASS INDEX (BMI) OF 50.0 TO 59.9 IN ADULT: ICD-10-CM

## 2023-02-23 DIAGNOSIS — Z98.84 STATUS POST LAPAROSCOPIC SLEEVE GASTRECTOMY: Primary | ICD-10-CM

## 2023-02-23 DIAGNOSIS — I10 PRIMARY HYPERTENSION: ICD-10-CM

## 2023-02-23 LAB
ABO GROUP BLD: NORMAL
BLD GP AB SCN SERPL QL: NEGATIVE
GLUCOSE BLDC GLUCOMTR-MCNC: 136 MG/DL (ref 70–130)
RH BLD: POSITIVE
T&S EXPIRATION DATE: NORMAL

## 2023-02-23 PROCEDURE — 88307 TISSUE EXAM BY PATHOLOGIST: CPT | Performed by: SURGERY

## 2023-02-23 PROCEDURE — 36415 COLL VENOUS BLD VENIPUNCTURE: CPT

## 2023-02-23 PROCEDURE — 25010000002 DEXAMETHASONE PER 1 MG: Performed by: NURSE ANESTHETIST, CERTIFIED REGISTERED

## 2023-02-23 PROCEDURE — 25010000002 KETOROLAC TROMETHAMINE PER 15 MG: Performed by: SURGERY

## 2023-02-23 PROCEDURE — 25010000002 HYDROMORPHONE PER 4 MG: Performed by: ANESTHESIOLOGY

## 2023-02-23 PROCEDURE — 25010000002 HYDROMORPHONE 1 MG/ML SOLUTION: Performed by: NURSE ANESTHETIST, CERTIFIED REGISTERED

## 2023-02-23 PROCEDURE — 25010000002 ENOXAPARIN PER 10 MG: Performed by: SURGERY

## 2023-02-23 PROCEDURE — 86900 BLOOD TYPING SEROLOGIC ABO: CPT

## 2023-02-23 PROCEDURE — 82962 GLUCOSE BLOOD TEST: CPT

## 2023-02-23 PROCEDURE — 25010000002 PROPOFOL 10 MG/ML EMULSION: Performed by: NURSE ANESTHETIST, CERTIFIED REGISTERED

## 2023-02-23 PROCEDURE — 25010000002 FENTANYL CITRATE (PF) 250 MCG/5ML SOLUTION: Performed by: NURSE ANESTHETIST, CERTIFIED REGISTERED

## 2023-02-23 PROCEDURE — 25010000002 ONDANSETRON PER 1 MG: Performed by: NURSE ANESTHETIST, CERTIFIED REGISTERED

## 2023-02-23 PROCEDURE — C9399 UNCLASSIFIED DRUGS OR BIOLOG: HCPCS | Performed by: SURGERY

## 2023-02-23 PROCEDURE — 25010000002 KETOROLAC TROMETHAMINE PER 15 MG: Performed by: NURSE ANESTHETIST, CERTIFIED REGISTERED

## 2023-02-23 PROCEDURE — 25010000002 CEFAZOLIN PER 500 MG: Performed by: SURGERY

## 2023-02-23 PROCEDURE — 0 LIDOCAINE 1 % SOLUTION 20 ML VIAL: Performed by: SURGERY

## 2023-02-23 PROCEDURE — 8E0W4CZ ROBOTIC ASSISTED PROCEDURE OF TRUNK REGION, PERCUTANEOUS ENDOSCOPIC APPROACH: ICD-10-PCS | Performed by: SURGERY

## 2023-02-23 PROCEDURE — 25010000002 AMISULPRIDE (ANTIEMETIC) 5 MG/2ML SOLUTION: Performed by: SURGERY

## 2023-02-23 PROCEDURE — 0DB64Z3 EXCISION OF STOMACH, PERCUTANEOUS ENDOSCOPIC APPROACH, VERTICAL: ICD-10-PCS | Performed by: SURGERY

## 2023-02-23 PROCEDURE — 86901 BLOOD TYPING SEROLOGIC RH(D): CPT

## 2023-02-23 PROCEDURE — 86850 RBC ANTIBODY SCREEN: CPT

## 2023-02-23 PROCEDURE — 43775 LAP SLEEVE GASTRECTOMY: CPT | Performed by: SURGERY

## 2023-02-23 PROCEDURE — 25010000002 ONDANSETRON PER 1 MG: Performed by: SURGERY

## 2023-02-23 DEVICE — STAPLER 60 RELOAD BLUE
Type: IMPLANTABLE DEVICE | Site: ABDOMEN | Status: FUNCTIONAL
Brand: SUREFORM

## 2023-02-23 DEVICE — STAPLER 60 RELOAD WHITE
Type: IMPLANTABLE DEVICE | Site: ABDOMEN | Status: FUNCTIONAL
Brand: SUREFORM

## 2023-02-23 DEVICE — LIGAMAX 5 MM ENDOSCOPIC MULTIPLE CLIP APPLIER
Type: IMPLANTABLE DEVICE | Site: ABDOMEN | Status: FUNCTIONAL
Brand: LIGAMAX

## 2023-02-23 DEVICE — HEMOST ABS SURGICEL SNOW 4X4IN: Type: IMPLANTABLE DEVICE | Site: STOMACH | Status: FUNCTIONAL

## 2023-02-23 RX ORDER — LABETALOL HYDROCHLORIDE 5 MG/ML
5 INJECTION, SOLUTION INTRAVENOUS
Status: DISCONTINUED | OUTPATIENT
Start: 2023-02-23 | End: 2023-02-23 | Stop reason: HOSPADM

## 2023-02-23 RX ORDER — MAGNESIUM HYDROXIDE 1200 MG/15ML
LIQUID ORAL AS NEEDED
Status: DISCONTINUED | OUTPATIENT
Start: 2023-02-23 | End: 2023-02-23 | Stop reason: HOSPADM

## 2023-02-23 RX ORDER — CEFAZOLIN SODIUM IN 0.9 % NACL 3 G/100 ML
3 INTRAVENOUS SOLUTION, PIGGYBACK (ML) INTRAVENOUS ONCE
Status: COMPLETED | OUTPATIENT
Start: 2023-02-23 | End: 2023-02-23

## 2023-02-23 RX ORDER — SODIUM CHLORIDE 9 MG/ML
40 INJECTION, SOLUTION INTRAVENOUS AS NEEDED
Status: DISCONTINUED | OUTPATIENT
Start: 2023-02-23 | End: 2023-02-23 | Stop reason: HOSPADM

## 2023-02-23 RX ORDER — LIDOCAINE HYDROCHLORIDE 10 MG/ML
0.5 INJECTION, SOLUTION EPIDURAL; INFILTRATION; INTRACAUDAL; PERINEURAL ONCE AS NEEDED
Status: DISCONTINUED | OUTPATIENT
Start: 2023-02-23 | End: 2023-02-23 | Stop reason: HOSPADM

## 2023-02-23 RX ORDER — HYDROMORPHONE HYDROCHLORIDE 1 MG/ML
0.5 INJECTION, SOLUTION INTRAMUSCULAR; INTRAVENOUS; SUBCUTANEOUS
Status: DISCONTINUED | OUTPATIENT
Start: 2023-02-23 | End: 2023-02-23 | Stop reason: HOSPADM

## 2023-02-23 RX ORDER — SODIUM CHLORIDE, SODIUM LACTATE, POTASSIUM CHLORIDE, CALCIUM CHLORIDE 600; 310; 30; 20 MG/100ML; MG/100ML; MG/100ML; MG/100ML
100 INJECTION, SOLUTION INTRAVENOUS CONTINUOUS
Status: DISCONTINUED | OUTPATIENT
Start: 2023-02-23 | End: 2023-02-23

## 2023-02-23 RX ORDER — LABETALOL HYDROCHLORIDE 5 MG/ML
10 INJECTION, SOLUTION INTRAVENOUS
Status: DISCONTINUED | OUTPATIENT
Start: 2023-02-23 | End: 2023-02-24 | Stop reason: HOSPADM

## 2023-02-23 RX ORDER — HYDROMORPHONE HYDROCHLORIDE 1 MG/ML
0.5 INJECTION, SOLUTION INTRAMUSCULAR; INTRAVENOUS; SUBCUTANEOUS
Status: DISCONTINUED | OUTPATIENT
Start: 2023-02-23 | End: 2023-02-24 | Stop reason: HOSPADM

## 2023-02-23 RX ORDER — ACETAMINOPHEN 160 MG/5ML
325 SOLUTION ORAL EVERY 6 HOURS
Status: DISCONTINUED | OUTPATIENT
Start: 2023-02-23 | End: 2023-02-24 | Stop reason: HOSPADM

## 2023-02-23 RX ORDER — KETOROLAC TROMETHAMINE 30 MG/ML
30 INJECTION, SOLUTION INTRAMUSCULAR; INTRAVENOUS EVERY 6 HOURS PRN
Status: DISCONTINUED | OUTPATIENT
Start: 2023-02-23 | End: 2023-02-24 | Stop reason: HOSPADM

## 2023-02-23 RX ORDER — KETOROLAC TROMETHAMINE 30 MG/ML
INJECTION, SOLUTION INTRAMUSCULAR; INTRAVENOUS AS NEEDED
Status: DISCONTINUED | OUTPATIENT
Start: 2023-02-23 | End: 2023-02-23 | Stop reason: SURG

## 2023-02-23 RX ORDER — FENTANYL CITRATE 50 UG/ML
INJECTION, SOLUTION INTRAMUSCULAR; INTRAVENOUS AS NEEDED
Status: DISCONTINUED | OUTPATIENT
Start: 2023-02-23 | End: 2023-02-23 | Stop reason: SURG

## 2023-02-23 RX ORDER — FENTANYL CITRATE 50 UG/ML
25 INJECTION, SOLUTION INTRAMUSCULAR; INTRAVENOUS
Status: DISCONTINUED | OUTPATIENT
Start: 2023-02-23 | End: 2023-02-23 | Stop reason: HOSPADM

## 2023-02-23 RX ORDER — SODIUM CHLORIDE 0.9 % (FLUSH) 0.9 %
10 SYRINGE (ML) INJECTION AS NEEDED
Status: DISCONTINUED | OUTPATIENT
Start: 2023-02-23 | End: 2023-02-23 | Stop reason: HOSPADM

## 2023-02-23 RX ORDER — SIMETHICONE 80 MG
40 TABLET,CHEWABLE ORAL 4 TIMES DAILY PRN
Status: DISCONTINUED | OUTPATIENT
Start: 2023-02-23 | End: 2023-02-24 | Stop reason: HOSPADM

## 2023-02-23 RX ORDER — SCOLOPAMINE TRANSDERMAL SYSTEM 1 MG/1
1 PATCH, EXTENDED RELEASE TRANSDERMAL CONTINUOUS
Status: DISCONTINUED | OUTPATIENT
Start: 2023-02-23 | End: 2023-02-23 | Stop reason: SDUPTHER

## 2023-02-23 RX ORDER — ONDANSETRON 2 MG/ML
4 INJECTION INTRAMUSCULAR; INTRAVENOUS EVERY 6 HOURS
Status: DISCONTINUED | OUTPATIENT
Start: 2023-02-23 | End: 2023-02-24 | Stop reason: HOSPADM

## 2023-02-23 RX ORDER — BUPIVACAINE HCL/0.9 % NACL/PF 0.125 %
PLASTIC BAG, INJECTION (ML) EPIDURAL AS NEEDED
Status: DISCONTINUED | OUTPATIENT
Start: 2023-02-23 | End: 2023-02-23 | Stop reason: SURG

## 2023-02-23 RX ORDER — ONDANSETRON 2 MG/ML
4 INJECTION INTRAMUSCULAR; INTRAVENOUS EVERY 6 HOURS PRN
Status: DISCONTINUED | OUTPATIENT
Start: 2023-02-23 | End: 2023-02-23

## 2023-02-23 RX ORDER — ACETAMINOPHEN 500 MG
1000 TABLET ORAL ONCE
Status: DISCONTINUED | OUTPATIENT
Start: 2023-02-23 | End: 2023-02-23 | Stop reason: HOSPADM

## 2023-02-23 RX ORDER — SODIUM CHLORIDE 0.9 % (FLUSH) 0.9 %
3-10 SYRINGE (ML) INJECTION AS NEEDED
Status: DISCONTINUED | OUTPATIENT
Start: 2023-02-23 | End: 2023-02-23 | Stop reason: HOSPADM

## 2023-02-23 RX ORDER — SODIUM CHLORIDE 0.9 % (FLUSH) 0.9 %
1-10 SYRINGE (ML) INJECTION AS NEEDED
Status: DISCONTINUED | OUTPATIENT
Start: 2023-02-23 | End: 2023-02-24 | Stop reason: HOSPADM

## 2023-02-23 RX ORDER — SODIUM CHLORIDE 0.9 % (FLUSH) 0.9 %
3 SYRINGE (ML) INJECTION AS NEEDED
Status: DISCONTINUED | OUTPATIENT
Start: 2023-02-23 | End: 2023-02-23 | Stop reason: HOSPADM

## 2023-02-23 RX ORDER — PROPOFOL 10 MG/ML
VIAL (ML) INTRAVENOUS AS NEEDED
Status: DISCONTINUED | OUTPATIENT
Start: 2023-02-23 | End: 2023-02-23 | Stop reason: SURG

## 2023-02-23 RX ORDER — SODIUM CHLORIDE, SODIUM LACTATE, POTASSIUM CHLORIDE, CALCIUM CHLORIDE 600; 310; 30; 20 MG/100ML; MG/100ML; MG/100ML; MG/100ML
1000 INJECTION, SOLUTION INTRAVENOUS CONTINUOUS
Status: DISCONTINUED | OUTPATIENT
Start: 2023-02-23 | End: 2023-02-23

## 2023-02-23 RX ORDER — DEXAMETHASONE SODIUM PHOSPHATE 4 MG/ML
4 INJECTION, SOLUTION INTRA-ARTICULAR; INTRALESIONAL; INTRAMUSCULAR; INTRAVENOUS; SOFT TISSUE EVERY 8 HOURS PRN
Status: DISCONTINUED | OUTPATIENT
Start: 2023-02-23 | End: 2023-02-24 | Stop reason: HOSPADM

## 2023-02-23 RX ORDER — FAMOTIDINE 10 MG/ML
20 INJECTION, SOLUTION INTRAVENOUS EVERY 12 HOURS SCHEDULED
Status: DISCONTINUED | OUTPATIENT
Start: 2023-02-23 | End: 2023-02-24 | Stop reason: HOSPADM

## 2023-02-23 RX ORDER — LIDOCAINE HYDROCHLORIDE 20 MG/ML
INJECTION, SOLUTION EPIDURAL; INFILTRATION; INTRACAUDAL; PERINEURAL AS NEEDED
Status: DISCONTINUED | OUTPATIENT
Start: 2023-02-23 | End: 2023-02-23 | Stop reason: SURG

## 2023-02-23 RX ORDER — ROCURONIUM BROMIDE 10 MG/ML
INJECTION, SOLUTION INTRAVENOUS AS NEEDED
Status: DISCONTINUED | OUTPATIENT
Start: 2023-02-23 | End: 2023-02-23 | Stop reason: SURG

## 2023-02-23 RX ORDER — NALOXONE HCL 0.4 MG/ML
0.1 VIAL (ML) INJECTION
Status: DISCONTINUED | OUTPATIENT
Start: 2023-02-23 | End: 2023-02-24 | Stop reason: HOSPADM

## 2023-02-23 RX ORDER — SODIUM CHLORIDE 0.9 % (FLUSH) 0.9 %
10 SYRINGE (ML) INJECTION EVERY 12 HOURS SCHEDULED
Status: DISCONTINUED | OUTPATIENT
Start: 2023-02-23 | End: 2023-02-24 | Stop reason: HOSPADM

## 2023-02-23 RX ORDER — GABAPENTIN 250 MG/5ML
250 SOLUTION ORAL ONCE
Status: COMPLETED | OUTPATIENT
Start: 2023-02-23 | End: 2023-02-23

## 2023-02-23 RX ORDER — NALOXONE HCL 0.4 MG/ML
0.04 VIAL (ML) INJECTION AS NEEDED
Status: DISCONTINUED | OUTPATIENT
Start: 2023-02-23 | End: 2023-02-23 | Stop reason: HOSPADM

## 2023-02-23 RX ORDER — SODIUM CHLORIDE, SODIUM LACTATE, POTASSIUM CHLORIDE, CALCIUM CHLORIDE 600; 310; 30; 20 MG/100ML; MG/100ML; MG/100ML; MG/100ML
75 INJECTION, SOLUTION INTRAVENOUS CONTINUOUS
Status: DISCONTINUED | OUTPATIENT
Start: 2023-02-23 | End: 2023-02-24 | Stop reason: HOSPADM

## 2023-02-23 RX ORDER — LISINOPRIL 20 MG/1
20 TABLET ORAL DAILY
Status: DISCONTINUED | OUTPATIENT
Start: 2023-02-24 | End: 2023-02-24 | Stop reason: HOSPADM

## 2023-02-23 RX ORDER — ENOXAPARIN SODIUM 100 MG/ML
40 INJECTION SUBCUTANEOUS ONCE
Status: COMPLETED | OUTPATIENT
Start: 2023-02-23 | End: 2023-02-23

## 2023-02-23 RX ORDER — DEXAMETHASONE SODIUM PHOSPHATE 4 MG/ML
INJECTION, SOLUTION INTRA-ARTICULAR; INTRALESIONAL; INTRAMUSCULAR; INTRAVENOUS; SOFT TISSUE AS NEEDED
Status: DISCONTINUED | OUTPATIENT
Start: 2023-02-23 | End: 2023-02-23 | Stop reason: SURG

## 2023-02-23 RX ORDER — KETAMINE HCL IN NACL, ISO-OSM 100MG/10ML
SYRINGE (ML) INJECTION AS NEEDED
Status: DISCONTINUED | OUTPATIENT
Start: 2023-02-23 | End: 2023-02-23 | Stop reason: SURG

## 2023-02-23 RX ORDER — SODIUM CHLORIDE 9 MG/ML
40 INJECTION, SOLUTION INTRAVENOUS AS NEEDED
Status: DISCONTINUED | OUTPATIENT
Start: 2023-02-23 | End: 2023-02-24 | Stop reason: HOSPADM

## 2023-02-23 RX ORDER — ENOXAPARIN SODIUM 100 MG/ML
40 INJECTION SUBCUTANEOUS DAILY
Status: DISCONTINUED | OUTPATIENT
Start: 2023-02-24 | End: 2023-02-24 | Stop reason: HOSPADM

## 2023-02-23 RX ORDER — SODIUM CHLORIDE 0.9 % (FLUSH) 0.9 %
3 SYRINGE (ML) INJECTION EVERY 12 HOURS SCHEDULED
Status: DISCONTINUED | OUTPATIENT
Start: 2023-02-23 | End: 2023-02-23 | Stop reason: HOSPADM

## 2023-02-23 RX ORDER — SCOLOPAMINE TRANSDERMAL SYSTEM 1 MG/1
1 PATCH, EXTENDED RELEASE TRANSDERMAL
Status: DISCONTINUED | OUTPATIENT
Start: 2023-02-23 | End: 2023-02-24 | Stop reason: HOSPADM

## 2023-02-23 RX ORDER — ONDANSETRON 2 MG/ML
INJECTION INTRAMUSCULAR; INTRAVENOUS AS NEEDED
Status: DISCONTINUED | OUTPATIENT
Start: 2023-02-23 | End: 2023-02-23 | Stop reason: SURG

## 2023-02-23 RX ORDER — FLUMAZENIL 0.1 MG/ML
0.2 INJECTION INTRAVENOUS AS NEEDED
Status: DISCONTINUED | OUTPATIENT
Start: 2023-02-23 | End: 2023-02-23 | Stop reason: HOSPADM

## 2023-02-23 RX ORDER — MIDAZOLAM HYDROCHLORIDE 1 MG/ML
1 INJECTION INTRAMUSCULAR; INTRAVENOUS
Status: DISCONTINUED | OUTPATIENT
Start: 2023-02-23 | End: 2023-02-23 | Stop reason: HOSPADM

## 2023-02-23 RX ORDER — DIPHENHYDRAMINE HYDROCHLORIDE 50 MG/ML
25 INJECTION INTRAMUSCULAR; INTRAVENOUS EVERY 6 HOURS PRN
Status: DISCONTINUED | OUTPATIENT
Start: 2023-02-23 | End: 2023-02-24 | Stop reason: HOSPADM

## 2023-02-23 RX ORDER — TRAMADOL HYDROCHLORIDE 50 MG/1
50 TABLET ORAL EVERY 6 HOURS PRN
Status: DISCONTINUED | OUTPATIENT
Start: 2023-02-23 | End: 2023-02-24 | Stop reason: HOSPADM

## 2023-02-23 RX ORDER — BUPIVACAINE HCL/0.9 % NACL/PF 0.1 %
2 PLASTIC BAG, INJECTION (ML) EPIDURAL EVERY 8 HOURS
Status: COMPLETED | OUTPATIENT
Start: 2023-02-23 | End: 2023-02-23

## 2023-02-23 RX ADMIN — Medication 20 MG: at 08:08

## 2023-02-23 RX ADMIN — PROPOFOL INJECTABLE EMULSION 200 MG: 10 INJECTION, EMULSION INTRAVENOUS at 06:45

## 2023-02-23 RX ADMIN — Medication 200 MCG: at 07:52

## 2023-02-23 RX ADMIN — SCOPALAMINE 1 PATCH: 1 PATCH, EXTENDED RELEASE TRANSDERMAL at 11:19

## 2023-02-23 RX ADMIN — FAMOTIDINE 20 MG: 10 INJECTION INTRAVENOUS at 13:21

## 2023-02-23 RX ADMIN — LIDOCAINE HYDROCHLORIDE 100 MG: 20 INJECTION, SOLUTION EPIDURAL; INFILTRATION; INTRACAUDAL at 08:41

## 2023-02-23 RX ADMIN — SUGAMMADEX 200 MG: 100 INJECTION, SOLUTION INTRAVENOUS at 08:38

## 2023-02-23 RX ADMIN — Medication 30 MG: at 07:07

## 2023-02-23 RX ADMIN — FAMOTIDINE 20 MG: 10 INJECTION INTRAVENOUS at 20:20

## 2023-02-23 RX ADMIN — LIDOCAINE HYDROCHLORIDE 100 MG: 20 INJECTION, SOLUTION EPIDURAL; INFILTRATION; INTRACAUDAL at 06:45

## 2023-02-23 RX ADMIN — Medication 100 MCG: at 07:37

## 2023-02-23 RX ADMIN — Medication 3 G: at 07:00

## 2023-02-23 RX ADMIN — SODIUM CHLORIDE, POTASSIUM CHLORIDE, SODIUM LACTATE AND CALCIUM CHLORIDE 500 ML: 600; 310; 30; 20 INJECTION, SOLUTION INTRAVENOUS at 06:01

## 2023-02-23 RX ADMIN — AMISULPRIDE 10 MG: 2.5 INJECTION, SOLUTION INTRAVENOUS at 09:17

## 2023-02-23 RX ADMIN — SODIUM CHLORIDE, POTASSIUM CHLORIDE, SODIUM LACTATE AND CALCIUM CHLORIDE 1000 ML: 600; 310; 30; 20 INJECTION, SOLUTION INTRAVENOUS at 06:01

## 2023-02-23 RX ADMIN — FENTANYL CITRATE 150 MCG: 50 INJECTION, SOLUTION INTRAMUSCULAR; INTRAVENOUS at 07:05

## 2023-02-23 RX ADMIN — GABAPENTIN 250 MG: 250 SOLUTION ORAL at 05:53

## 2023-02-23 RX ADMIN — SODIUM CHLORIDE, POTASSIUM CHLORIDE, SODIUM LACTATE AND CALCIUM CHLORIDE 140 ML/HR: 600; 310; 30; 20 INJECTION, SOLUTION INTRAVENOUS at 20:19

## 2023-02-23 RX ADMIN — FENTANYL CITRATE 100 MCG: 50 INJECTION, SOLUTION INTRAMUSCULAR; INTRAVENOUS at 06:45

## 2023-02-23 RX ADMIN — SIMETHICONE 40 MG: 80 TABLET, CHEWABLE ORAL at 20:20

## 2023-02-23 RX ADMIN — Medication 200 MCG: at 08:11

## 2023-02-23 RX ADMIN — PROPOFOL INJECTABLE EMULSION 20 MG: 10 INJECTION, EMULSION INTRAVENOUS at 08:48

## 2023-02-23 RX ADMIN — DEXAMETHASONE SODIUM PHOSPHATE 4 MG: 4 INJECTION, SOLUTION INTRA-ARTICULAR; INTRALESIONAL; INTRAMUSCULAR; INTRAVENOUS; SOFT TISSUE at 07:08

## 2023-02-23 RX ADMIN — ONDANSETRON 4 MG: 2 INJECTION INTRAMUSCULAR; INTRAVENOUS at 20:20

## 2023-02-23 RX ADMIN — ENOXAPARIN SODIUM 40 MG: 100 INJECTION SUBCUTANEOUS at 06:26

## 2023-02-23 RX ADMIN — ACETAMINOPHEN 325 MG: 325 SUSPENSION ORAL at 20:20

## 2023-02-23 RX ADMIN — ROCURONIUM BROMIDE 10 MG: 10 INJECTION, SOLUTION INTRAVENOUS at 08:08

## 2023-02-23 RX ADMIN — HYDROMORPHONE HYDROCHLORIDE 1 MG: 1 INJECTION, SOLUTION INTRAMUSCULAR; INTRAVENOUS; SUBCUTANEOUS at 08:38

## 2023-02-23 RX ADMIN — ONDANSETRON 4 MG: 2 INJECTION INTRAMUSCULAR; INTRAVENOUS at 13:20

## 2023-02-23 RX ADMIN — PROPOFOL INJECTABLE EMULSION 20 MG: 10 INJECTION, EMULSION INTRAVENOUS at 08:46

## 2023-02-23 RX ADMIN — SIMETHICONE 40 MG: 80 TABLET, CHEWABLE ORAL at 17:09

## 2023-02-23 RX ADMIN — ACETAMINOPHEN 325 MG: 325 SUSPENSION ORAL at 13:21

## 2023-02-23 RX ADMIN — SIMETHICONE 40 MG: 80 TABLET, CHEWABLE ORAL at 11:16

## 2023-02-23 RX ADMIN — Medication 100 MCG: at 07:49

## 2023-02-23 RX ADMIN — CEFAZOLIN 2 G: 2 INJECTION, POWDER, FOR SOLUTION INTRAMUSCULAR; INTRAVENOUS at 16:13

## 2023-02-23 RX ADMIN — ROCURONIUM BROMIDE 80 MG: 10 INJECTION, SOLUTION INTRAVENOUS at 06:46

## 2023-02-23 RX ADMIN — Medication 100 MCG: at 07:12

## 2023-02-23 RX ADMIN — KETOROLAC TROMETHAMINE 30 MG: 30 INJECTION, SOLUTION INTRAMUSCULAR; INTRAVENOUS at 20:20

## 2023-02-23 RX ADMIN — HYDROMORPHONE HYDROCHLORIDE 0.5 MG: 1 INJECTION, SOLUTION INTRAMUSCULAR; INTRAVENOUS; SUBCUTANEOUS at 09:56

## 2023-02-23 RX ADMIN — ONDANSETRON 4 MG: 2 INJECTION INTRAMUSCULAR; INTRAVENOUS at 08:09

## 2023-02-23 RX ADMIN — KETOROLAC TROMETHAMINE 30 MG: 30 INJECTION, SOLUTION INTRAMUSCULAR; INTRAVENOUS at 08:25

## 2023-02-23 RX ADMIN — CEFAZOLIN 2 G: 2 INJECTION, POWDER, FOR SOLUTION INTRAMUSCULAR; INTRAVENOUS at 22:38

## 2023-02-23 RX ADMIN — SODIUM CHLORIDE, POTASSIUM CHLORIDE, SODIUM LACTATE AND CALCIUM CHLORIDE 140 ML/HR: 600; 310; 30; 20 INJECTION, SOLUTION INTRAVENOUS at 12:28

## 2023-02-23 NOTE — ANESTHESIA PREPROCEDURE EVALUATION
Anesthesia Evaluation     Patient summary reviewed   no history of anesthetic complications:  NPO Solid Status: > 8 hours  NPO Liquid Status: > 8 hours           Airway   Mallampati: III  Large neck circumference and Possible difficult intubation  Dental    (+) poor dentition        Pulmonary    (+) sleep apnea on CPAP,   (-) asthma, not a smoker  Cardiovascular   Exercise tolerance: excellent (>7 METS)    (+) hypertension,       Neuro/Psych  (-) seizures, TIA, CVA  GI/Hepatic/Renal/Endo    (+) morbid obesity,      Musculoskeletal     Abdominal   (+) obese,    Substance History      OB/GYN          Other                          Anesthesia Plan    ASA 3     general     intravenous induction     Anesthetic plan, risks, benefits, and alternatives have been provided, discussed and informed consent has been obtained with: patient.        CODE STATUS:

## 2023-02-23 NOTE — ANESTHESIA POSTPROCEDURE EVALUATION
"Patient: Santiago Mcgraw    Procedure Summary     Date: 02/23/23 Room / Location:  [facility] OR  /  [facility]    Anesthesia Start: 0642 Anesthesia Stop: 0902    Procedure: GASTRIC SLEEVE LAPAROSCOPIC WITH DAVINCI ROBOT (Abdomen) Diagnosis:       Class 3 severe obesity due to excess calories with serious comorbidity and body mass index (BMI) of 50.0 to 59.9 in adult ([facility])      Primary hypertension      Obstructive sleep apnea syndrome      (Class 3 severe obesity due to excess calories with serious comorbidity and body mass index (BMI) of 50.0 to 59.9 in adult (HCC) [E66.01, Z68.43])      (Primary hypertension [I10])      (Obstructive sleep apnea syndrome [G47.33])    Surgeons: Solitario Luo MD Provider: Tash Santoyo CRNA    Anesthesia Type: general ASA Status: 3          Anesthesia Type: general    Vitals  Vitals Value Taken Time   /64 02/23/23 1210   Temp 97.6 °F (36.4 °C) 02/23/23 1210   Pulse 67 02/23/23 1210   Resp 16 02/23/23 1210   SpO2 98 % 02/23/23 1210           Post Anesthesia Care and Evaluation    Patient location during evaluation: PACU  Patient participation: complete - patient participated  Level of consciousness: awake and alert  Pain management: adequate    Airway patency: patent  Anesthetic complications: No anesthetic complications    Cardiovascular status: acceptable  Respiratory status: acceptable  Hydration status: acceptable    Comments: Blood pressure 132/68, pulse 61, temperature 98.2 °F (36.8 °C), temperature source Oral, resp. rate 16, height 184 cm (72.44\"), weight (!) 176 kg (388 lb 3.7 oz), SpO2 97 %.    Pt discharged from PACU based on yevgeniy score >8      "

## 2023-02-23 NOTE — ANESTHESIA PROCEDURE NOTES
Airway  Urgency: elective    Date/Time: 2/23/2023 6:47 AM  Airway not difficult    General Information and Staff    Patient location during procedure: OR  CRNA/CAA: Tash Santoyo CRNA    Indications and Patient Condition  Indications for airway management: airway protection    Preoxygenated: yes  Mask difficulty assessment: 2 - vent by mask + OA or adjuvant +/- NMBA    Final Airway Details  Final airway type: endotracheal airway      Successful airway: ETT  Cuffed: yes   Successful intubation technique: direct laryngoscopy and video laryngoscopy  Facilitating devices/methods: intubating stylet  Endotracheal tube insertion site: oral  Blade: Rivera  Blade size: 4  ETT size (mm): 7.5  Cormack-Lehane Classification: grade I - full view of glottis  Placement verified by: chest auscultation and capnometry   Cuff volume (mL): 6  Measured from: lips  ETT/EBT  to lips (cm): 23  Number of attempts at approach: 1  Assessment: lips, teeth, and gum same as pre-op and atraumatic intubation

## 2023-02-24 VITALS
WEIGHT: 315 LBS | HEIGHT: 72 IN | TEMPERATURE: 98.5 F | OXYGEN SATURATION: 100 % | BODY MASS INDEX: 42.66 KG/M2 | RESPIRATION RATE: 18 BRPM | HEART RATE: 63 BPM | SYSTOLIC BLOOD PRESSURE: 136 MMHG | DIASTOLIC BLOOD PRESSURE: 67 MMHG

## 2023-02-24 LAB
CYTO UR: NORMAL
HCT VFR BLD AUTO: 42.5 % (ref 37.5–51)
HGB BLD-MCNC: 13.7 G/DL (ref 13–17.7)
LAB AP CASE REPORT: NORMAL
Lab: NORMAL
PATH REPORT.FINAL DX SPEC: NORMAL
PATH REPORT.GROSS SPEC: NORMAL

## 2023-02-24 PROCEDURE — 25010000002 ONDANSETRON PER 1 MG: Performed by: SURGERY

## 2023-02-24 PROCEDURE — 85014 HEMATOCRIT: CPT | Performed by: SURGERY

## 2023-02-24 PROCEDURE — 85018 HEMOGLOBIN: CPT | Performed by: SURGERY

## 2023-02-24 PROCEDURE — 25010000002 ENOXAPARIN PER 10 MG: Performed by: SURGERY

## 2023-02-24 RX ORDER — OMEPRAZOLE 20 MG/1
20 CAPSULE, DELAYED RELEASE ORAL DAILY
Qty: 30 CAPSULE | Refills: 0 | Status: SHIPPED | OUTPATIENT
Start: 2023-02-24 | End: 2023-03-27

## 2023-02-24 RX ORDER — TRAMADOL HYDROCHLORIDE 50 MG/1
50 TABLET ORAL EVERY 8 HOURS PRN
Qty: 30 TABLET | Refills: 0 | Status: SHIPPED | OUTPATIENT
Start: 2023-02-24 | End: 2023-03-27

## 2023-02-24 RX ORDER — ONDANSETRON 4 MG/1
4 TABLET, ORALLY DISINTEGRATING ORAL EVERY 8 HOURS PRN
Qty: 30 TABLET | Refills: 1 | Status: SHIPPED | OUTPATIENT
Start: 2023-02-24 | End: 2023-03-27

## 2023-02-24 RX ORDER — SIMETHICONE 80 MG
40 TABLET,CHEWABLE ORAL EVERY 6 HOURS PRN
Qty: 30 TABLET | Refills: 1 | Status: SHIPPED | OUTPATIENT
Start: 2023-02-24 | End: 2023-04-06 | Stop reason: SDUPTHER

## 2023-02-24 RX ADMIN — FAMOTIDINE 20 MG: 10 INJECTION INTRAVENOUS at 08:29

## 2023-02-24 RX ADMIN — ENOXAPARIN SODIUM 40 MG: 100 INJECTION SUBCUTANEOUS at 08:29

## 2023-02-24 RX ADMIN — Medication 10 ML: at 08:30

## 2023-02-24 RX ADMIN — ONDANSETRON 4 MG: 2 INJECTION INTRAMUSCULAR; INTRAVENOUS at 01:12

## 2023-02-24 RX ADMIN — ACETAMINOPHEN 325 MG: 325 SUSPENSION ORAL at 13:05

## 2023-02-24 RX ADMIN — ONDANSETRON 4 MG: 2 INJECTION INTRAMUSCULAR; INTRAVENOUS at 13:28

## 2023-02-24 RX ADMIN — ACETAMINOPHEN 325 MG: 325 SUSPENSION ORAL at 01:12

## 2023-02-24 RX ADMIN — LISINOPRIL 20 MG: 20 TABLET ORAL at 08:29

## 2023-02-24 RX ADMIN — ACETAMINOPHEN 325 MG: 325 SUSPENSION ORAL at 08:29

## 2023-02-24 RX ADMIN — ONDANSETRON 4 MG: 2 INJECTION INTRAMUSCULAR; INTRAVENOUS at 08:29

## 2023-02-24 RX ADMIN — TRAMADOL HYDROCHLORIDE 50 MG: 50 TABLET, COATED ORAL at 01:12

## 2023-02-27 ENCOUNTER — TELEPHONE (OUTPATIENT)
Dept: BARIATRICS/WEIGHT MGMT | Facility: CLINIC | Age: 48
End: 2023-02-27
Payer: COMMERCIAL

## 2023-02-27 NOTE — TELEPHONE ENCOUNTER
: [phone]          Patient doing: good        PROCEDURE INFORMATION:   Date of Procedure: 02/23/203  Follow up appointment: 03/01/2023      1. Are you tolerating liquids? yes  Reason:      2. How many ounces of liquid are you getting per day? 60oz     3. Are you ambulating well? yes     4. Do you have nausea or vomiting? no     5. How do your incisions look? good                  6. Do you have any concerns?  not at this time

## 2023-03-01 ENCOUNTER — OFFICE VISIT (OUTPATIENT)
Dept: BARIATRICS/WEIGHT MGMT | Facility: CLINIC | Age: 48
End: 2023-03-01
Payer: COMMERCIAL

## 2023-03-01 VITALS
SYSTOLIC BLOOD PRESSURE: 134 MMHG | TEMPERATURE: 98 F | OXYGEN SATURATION: 98 % | WEIGHT: 315 LBS | DIASTOLIC BLOOD PRESSURE: 83 MMHG | BODY MASS INDEX: 42.66 KG/M2 | HEIGHT: 72 IN | HEART RATE: 88 BPM

## 2023-03-01 DIAGNOSIS — Z98.84 STATUS POST LAPAROSCOPIC SLEEVE GASTRECTOMY: Primary | ICD-10-CM

## 2023-03-01 PROCEDURE — 99024 POSTOP FOLLOW-UP VISIT: CPT | Performed by: SURGERY

## 2023-03-01 NOTE — PROGRESS NOTES
"  Patient Care Team:  Angelo Mckinnon APRN as PCP - General (Family Medicine)  Francie Cabrera APRN as Nurse Practitioner (Nurse Practitioner)    Subjective   Santiago Mcgraw is a 47 y.o. male.     Santiago is post op 1 week from a sleeve gastrectomy.  He is currently on his stage II diet.  He states he is consuming at least 60 ounces of fluid and 60 g of protein.  He is to start his protein shakes yesterday.  He has been ambulating well.  He does note a rash developing around his abdomen consistent with the distribution of his abdominal binder which has latex in it.    Review Of Systems:  General ROS: negative  Respiratory ROS: no cough, shortness of breath, or wheezing  Cardiovascular ROS: no chest pain or dyspnea on exertion  Gastrointestinal ROS: no abdominal pain, change in bowel habits, or black or bloody stools    The following portions of the patient's history were reviewed and updated as appropriate: allergies, current medications, past family history, past medical history, past social history, past surgical history and problem list.    Objective   /83 (BP Location: Right arm, Patient Position: Sitting, Cuff Size: Adult)   Pulse 88   Temp 98 °F (36.7 °C)   Ht 182.9 cm (72\")   Wt (!) 172 kg (380 lb)   SpO2 98%   BMI 51.54 kg/m²       03/01/23  0940   Weight: (!) 172 kg (380 lb)        General Appearance:  awake, alert, oriented, in no acute distress  Skin:  rash: Rash: localized redness, some areas shaped like streaks or lines  Lungs:  Normal expansion.  Clear to auscultation.  No rales, rhonchi, or wheezing.  Heart:  Heart regular rate and rhythm  Abdomen:  Soft, non-tender, normal bowel sounds; no bruits, organomegaly or masses.  Abnormal shape: obese  Extremities: Extremities warm to touch, pink, with no edema.  Wounds: clean, dry, intact or Primarily    ASSESSMENT/ PLAN    Encounter Diagnoses   Name Primary?   • Status post laparoscopic sleeve gastrectomy Yes     May start his " multivitamins as directed.  The patient and I discussed their weight restrictions which is defined as avoid lifting greater than 15 lbs for at least 4 weeks to allow healing of his tissue.  I also discussed the avoidance of driving or operating a motor vehicle if he requires, needs taking pain medication, or has a distracting injury or pain because of the risk of injuring himself or others.  The patient may advance diet as directed.    03/01/23  09:46 CST  Patient Care Team:  Angelo Mckinnon APRN as PCP - General (Family Medicine)  Francie Cabrera APRN as Nurse Practitioner (Nurse Practitioner)  Solitario Luo MD FACS

## 2023-03-06 ENCOUNTER — OFFICE VISIT (OUTPATIENT)
Dept: PRIMARY CARE CLINIC | Age: 48
End: 2023-03-06
Payer: COMMERCIAL

## 2023-03-06 ENCOUNTER — TELEPHONE (OUTPATIENT)
Dept: BARIATRICS/WEIGHT MGMT | Facility: CLINIC | Age: 48
End: 2023-03-06
Payer: COMMERCIAL

## 2023-03-06 VITALS
TEMPERATURE: 98 F | HEART RATE: 83 BPM | OXYGEN SATURATION: 100 % | WEIGHT: 315 LBS | DIASTOLIC BLOOD PRESSURE: 74 MMHG | BODY MASS INDEX: 50.18 KG/M2 | SYSTOLIC BLOOD PRESSURE: 120 MMHG

## 2023-03-06 DIAGNOSIS — M10.9 ACUTE GOUT OF RIGHT FOOT, UNSPECIFIED CAUSE: Primary | ICD-10-CM

## 2023-03-06 PROCEDURE — 3074F SYST BP LT 130 MM HG: CPT | Performed by: NURSE PRACTITIONER

## 2023-03-06 PROCEDURE — 96372 THER/PROPH/DIAG INJ SC/IM: CPT | Performed by: NURSE PRACTITIONER

## 2023-03-06 PROCEDURE — 99203 OFFICE O/P NEW LOW 30 MIN: CPT | Performed by: NURSE PRACTITIONER

## 2023-03-06 PROCEDURE — 3078F DIAST BP <80 MM HG: CPT | Performed by: NURSE PRACTITIONER

## 2023-03-06 RX ORDER — BUPROPION HYDROCHLORIDE 150 MG/1
TABLET ORAL
COMMUNITY
Start: 2023-02-02 | End: 2023-03-08 | Stop reason: SDUPTHER

## 2023-03-06 RX ORDER — METHYLPREDNISOLONE 4 MG/1
TABLET ORAL
Qty: 1 KIT | Refills: 0 | Status: SHIPPED | OUTPATIENT
Start: 2023-03-06 | End: 2023-03-08

## 2023-03-06 RX ORDER — DEXAMETHASONE SODIUM PHOSPHATE 10 MG/ML
8 INJECTION INTRAMUSCULAR; INTRAVENOUS ONCE
Status: COMPLETED | OUTPATIENT
Start: 2023-03-06 | End: 2023-03-06

## 2023-03-06 RX ADMIN — DEXAMETHASONE SODIUM PHOSPHATE 8 MG: 10 INJECTION INTRAMUSCULAR; INTRAVENOUS at 13:35

## 2023-03-06 ASSESSMENT — ENCOUNTER SYMPTOMS
NAUSEA: 0
SHORTNESS OF BREATH: 0
COLOR CHANGE: 0
RHINORRHEA: 0
COUGH: 0
EYE ITCHING: 0
WHEEZING: 0
SORE THROAT: 0
ABDOMINAL PAIN: 0
SINUS PRESSURE: 0
BLOOD IN STOOL: 0
DIARRHEA: 0
EYE DISCHARGE: 0
VOMITING: 0
CONSTIPATION: 0

## 2023-03-06 NOTE — PROGRESS NOTES
[address] [facility] [place] [address]  Dept: [phone]  Dept Fax: [phone]  Loc: [phone]    Stephen Don is a 49 y.o. male who presents today for his medical conditions/complaints as noted below. Thuy Pham is complaining of Gout (Right foot)        HPI:   Gout  This is a new problem. The current episode started yesterday. The problem occurs constantly. The problem has been gradually worsening. Associated symptoms include joint swelling (gout of right foot). Pertinent negatives include no abdominal pain, chest pain, chills, congestion, coughing, fatigue, fever, headaches, myalgias, nausea, rash, sore throat or vomiting. The symptoms are aggravated by bending. He has tried nothing for the symptoms. Past Medical History:   Diagnosis Date    Hypertension     Sleep apnea        Past Surgical History:   Procedure Laterality Date    HIP SURGERY      3 Pins       Family History   Problem Relation Age of Onset    Diabetes Father        Social History     Tobacco Use    Smoking status: Never    Smokeless tobacco: Never   Substance Use Topics    Alcohol use: No     Alcohol/week: 0.0 standard drinks        Current Outpatient Medications   Medication Sig Dispense Refill    buPROPion (WELLBUTRIN XL) 150 MG extended release tablet TAKE ONE TABLET BY MOUTH IN THE MORNING      methylPREDNISolone (MEDROL DOSEPACK) 4 MG tablet Take by mouth.  1 kit 0    lisinopril (PRINIVIL;ZESTRIL) 20 MG tablet Take 1 tablet by mouth daily 30 tablet 11     Current Facility-Administered Medications   Medication Dose Route Frequency Provider Last Rate Last Admin    dexamethasone (DECADRON) injection 8 mg  8 mg IntraMUSCular Once Conda Hazard, APRN - CNP           Allergies   Allergen Reactions    Latex Swelling       Health Maintenance   Topic Date Due    COVID-19 Vaccine (1) Never done    Depression Screen  Never done    HIV screen  Never done    Hepatitis C screen  Never done    DTaP/Tdap/Td vaccine (1 - Tdap) Never done    Colorectal Cancer Screen  Never done    Flu vaccine (1) 08/01/2022    Lipids  08/24/2023    Hepatitis A vaccine  Aged Out    Hib vaccine  Aged Out    Meningococcal (ACWY) vaccine  Aged Out    Pneumococcal 0-64 years Vaccine  Aged Out       Subjective:   Review of Systems   Constitutional:  Negative for activity change, appetite change, chills, fatigue and fever. HENT:  Negative for congestion, ear pain, rhinorrhea, sinus pressure and sore throat. Eyes:  Negative for discharge and itching. Respiratory:  Negative for cough, shortness of breath and wheezing. Cardiovascular:  Negative for chest pain. Gastrointestinal:  Negative for abdominal pain, blood in stool, constipation, diarrhea, nausea and vomiting. Musculoskeletal:  Positive for gout and joint swelling (gout of right foot). Negative for myalgias. Skin:  Negative for color change and rash. Neurological:  Negative for dizziness and headaches. All other systems reviewed and are negative. Objective    Physical Exam  Vitals and nursing note reviewed. Constitutional:       General: He is not in acute distress. Appearance: Normal appearance. HENT:      Head: Normocephalic and atraumatic. Mouth/Throat:      Mouth: Mucous membranes are moist.      Pharynx: No posterior oropharyngeal erythema. Eyes:      Extraocular Movements: Extraocular movements intact. Pupils: Pupils are equal, round, and reactive to light. Cardiovascular:      Rate and Rhythm: Normal rate and regular rhythm. Pulses: Normal pulses. Heart sounds: Normal heart sounds. No murmur heard. Pulmonary:      Effort: Pulmonary effort is normal. No respiratory distress. Breath sounds: Normal breath sounds. No wheezing. Musculoskeletal:        Feet:    Feet:      Comments: Erythema and warmth noted to right foot without tophi noted   Skin:     General: Skin is warm.       Capillary Refill: Capillary refill takes less than 2 seconds. Coloration: Skin is not pale. Findings: No rash. Neurological:      General: No focal deficit present. Mental Status: He is alert and oriented to person, place, and time. Deep Tendon Reflexes: Reflexes are normal and symmetric. Psychiatric:         Attention and Perception: Attention normal.         Mood and Affect: Mood normal.         Behavior: Behavior normal. Behavior is cooperative. Thought Content: Thought content normal.       /74   Pulse 83   Temp 98 °F (36.7 °C)   Wt (!) 370 lb (167.8 kg)   SpO2 100%   BMI 50.18 kg/m²     Assessment         Diagnosis Orders   1. Acute gout of right foot, unspecified cause  dexamethasone (DECADRON) injection 8 mg    methylPREDNISolone (MEDROL DOSEPACK) 4 MG tablet          Plan   - Unable to prescribed NSAIDs as patient just had sleeve gastrectomy on 2/23.  - Medrol selina as directed starting tomorrow  - Dexamethasone injection today   - Avoid purine-rich foods  - The patient is to follow up with PCP if symptoms worsen/fail to improve. Orders Placed This Encounter   Medications    dexamethasone (DECADRON) injection 8 mg    methylPREDNISolone (MEDROL DOSEPACK) 4 MG tablet     Sig: Take by mouth. Dispense:  1 kit     Refill:  0      New Prescriptions    METHYLPREDNISOLONE (MEDROL DOSEPACK) 4 MG TABLET    Take by mouth. Return if symptoms worsen or fail to improve. Discussed use, benefits, and side effects of any prescribed medications. All patient questions were answered. Patient voiced understanding of care plan. Patient was given educational materials - see patient instructions below.      Patient Instructions   - Unable to prescribed NSAIDs as patient just had sleeve gastrectomy on 2/23.  - Medrol selina as directed starting tomorrow  - Dexamethasone injection today   - Avoid purine-rich foods  - The patient is to follow up with PCP if symptoms worsen/fail to improve.       Electronically signed by LUKAS Urbano CNP on 3/6/2023 at 1:22 PM

## 2023-03-06 NOTE — TELEPHONE ENCOUNTER
S/P  02/23/2023      Patient called regarding gout. He has had a flare up over the weekend and was concerned this maybe be surgical related. I spoke with Dr Luo and he doesn't think it is and to follow up with PCP. Patient was agreeable.

## 2023-03-06 NOTE — PATIENT INSTRUCTIONS
- Unable to prescribed NSAIDs as patient just had sleeve gastrectomy on 2/23.  - Medrol selina as directed starting tomorrow  - Dexamethasone injection today   - Avoid purine-rich foods  - The patient is to follow up with PCP if symptoms worsen/fail to improve.

## 2023-03-08 ENCOUNTER — OFFICE VISIT (OUTPATIENT)
Dept: FAMILY MEDICINE CLINIC | Age: 48
End: 2023-03-08
Payer: COMMERCIAL

## 2023-03-08 VITALS
SYSTOLIC BLOOD PRESSURE: 128 MMHG | OXYGEN SATURATION: 98 % | TEMPERATURE: 97.3 F | BODY MASS INDEX: 42.66 KG/M2 | WEIGHT: 315 LBS | HEIGHT: 72 IN | HEART RATE: 87 BPM | DIASTOLIC BLOOD PRESSURE: 78 MMHG

## 2023-03-08 DIAGNOSIS — Z00.00 ROUTINE PHYSICAL EXAMINATION: ICD-10-CM

## 2023-03-08 DIAGNOSIS — E66.01 CLASS 3 SEVERE OBESITY DUE TO EXCESS CALORIES WITHOUT SERIOUS COMORBIDITY WITH BODY MASS INDEX (BMI) OF 50.0 TO 59.9 IN ADULT (HCC): ICD-10-CM

## 2023-03-08 DIAGNOSIS — Z98.890 S/P GASTRIC SURGERY: ICD-10-CM

## 2023-03-08 DIAGNOSIS — M10.9 ACUTE GOUT INVOLVING TOE OF RIGHT FOOT, UNSPECIFIED CAUSE: Primary | ICD-10-CM

## 2023-03-08 DIAGNOSIS — I10 ESSENTIAL HYPERTENSION: ICD-10-CM

## 2023-03-08 DIAGNOSIS — F33.42 RECURRENT MAJOR DEPRESSIVE DISORDER, IN FULL REMISSION (HCC): ICD-10-CM

## 2023-03-08 PROCEDURE — 3074F SYST BP LT 130 MM HG: CPT | Performed by: NURSE PRACTITIONER

## 2023-03-08 PROCEDURE — 3078F DIAST BP <80 MM HG: CPT | Performed by: NURSE PRACTITIONER

## 2023-03-08 PROCEDURE — 99214 OFFICE O/P EST MOD 30 MIN: CPT | Performed by: NURSE PRACTITIONER

## 2023-03-08 RX ORDER — LISINOPRIL 20 MG/1
20 TABLET ORAL DAILY
Qty: 30 TABLET | Refills: 11 | Status: SHIPPED | OUTPATIENT
Start: 2023-03-08

## 2023-03-08 RX ORDER — BUPROPION HYDROCHLORIDE 150 MG/1
TABLET ORAL
Qty: 30 TABLET | Refills: 11 | Status: SHIPPED | OUTPATIENT
Start: 2023-03-08

## 2023-03-08 SDOH — ECONOMIC STABILITY: HOUSING INSECURITY
IN THE LAST 12 MONTHS, WAS THERE A TIME WHEN YOU DID NOT HAVE A STEADY PLACE TO SLEEP OR SLEPT IN A SHELTER (INCLUDING NOW)?: NO

## 2023-03-08 SDOH — ECONOMIC STABILITY: INCOME INSECURITY: HOW HARD IS IT FOR YOU TO PAY FOR THE VERY BASICS LIKE FOOD, HOUSING, MEDICAL CARE, AND HEATING?: NOT HARD AT ALL

## 2023-03-08 SDOH — ECONOMIC STABILITY: FOOD INSECURITY: WITHIN THE PAST 12 MONTHS, THE FOOD YOU BOUGHT JUST DIDN'T LAST AND YOU DIDN'T HAVE MONEY TO GET MORE.: NEVER TRUE

## 2023-03-08 SDOH — ECONOMIC STABILITY: FOOD INSECURITY: WITHIN THE PAST 12 MONTHS, YOU WORRIED THAT YOUR FOOD WOULD RUN OUT BEFORE YOU GOT MONEY TO BUY MORE.: NEVER TRUE

## 2023-03-08 ASSESSMENT — ENCOUNTER SYMPTOMS
SHORTNESS OF BREATH: 0
SORE THROAT: 0
RHINORRHEA: 0
TROUBLE SWALLOWING: 0
VOMITING: 0
DIARRHEA: 0
NAUSEA: 0
CONSTIPATION: 0
COUGH: 0
ABDOMINAL PAIN: 0

## 2023-03-08 ASSESSMENT — PATIENT HEALTH QUESTIONNAIRE - PHQ9
SUM OF ALL RESPONSES TO PHQ QUESTIONS 1-9: 0
2. FEELING DOWN, DEPRESSED OR HOPELESS: 0
SUM OF ALL RESPONSES TO PHQ QUESTIONS 1-9: 0
SUM OF ALL RESPONSES TO PHQ9 QUESTIONS 1 & 2: 0
1. LITTLE INTEREST OR PLEASURE IN DOING THINGS: 0
SUM OF ALL RESPONSES TO PHQ QUESTIONS 1-9: 0
SUM OF ALL RESPONSES TO PHQ QUESTIONS 1-9: 0

## 2023-03-08 NOTE — PROGRESS NOTES
Delisa Pham (:  1975) is a 49 y.o. male,Established patient, here for evaluation of the following chief complaint(s):  Gout (Went to walk in Monday. Received steroid shot. Also on oral steroid. Recent bariatric surgery end of feb. )      ASSESSMENT/PLAN:    ICD-10-CM    1. Acute gout involving toe of right foot, unspecified cause  M10.9 Uric Acid    Advised to stop using steroids given his recent gastric surgery and his symptoms are resolving. Going to get uric acid level in a few weeks when he has not enough flare to see if he needs prophylaxis      2. Essential hypertension  I10 lisinopril (PRINIVIL;ZESTRIL) 20 MG tablet     CBC with Auto Differential     Comprehensive Metabolic Panel     Lipid Panel     TSH     T4, Free     Microalbumin / Creatinine Urine Ratio     Uric Acid     Hemoglobin A1C     Urinalysis with Reflex to Culture        3. Recurrent major depressive disorder, in full remission ([facility] [address]  F33.42 buPROPion (WELLBUTRIN XL) 150 MG extended release tablet     CBC with Auto Differential     Comprehensive Metabolic Panel     Lipid Panel     TSH     T4, Free     Microalbumin / Creatinine Urine Ratio     Uric Acid     Hemoglobin A1C     Vitamin D 25 Hydroxy    Mood stable, previous filled and seen by psychiatry. Refilled Wellbutrin per patient request      4. Class 3 severe obesity due to excess calories without serious comorbidity with body mass index (BMI) of 50.0 to 59.9 in adult ([facility])  E66.01 CBC with Auto Differential    Z68.43 Comprehensive Metabolic Panel     Lipid Panel     TSH     T4, Free     Microalbumin / Creatinine Urine Ratio     Uric Acid     Hemoglobin A1C     Vitamin D 25 Hydroxy      5. Routine physical examination  Z00.00 CBC with Auto Differential     Comprehensive Metabolic Panel     Lipid Panel     TSH     T4, Free     Microalbumin / Creatinine Urine Ratio     Uric Acid     Hemoglobin A1C     Vitamin D 25 Hydroxy     Urinalysis with Reflex to Culture      6.  S/P gastric surgery  Z98.890           Return in about 2 weeks (around 3/22/2023) for yearly physical.    SUBJECTIVE/OBJECTIVE:  HPI  Here for follow up from Urgent care visit   Seen on 03/06/2023 for gout flare to right foot. Given steroid injection and steroid pk due to recent gastric surgery for weight loss (Dr Ida Carranza). The steroid shot helped a lot. Weight is down 30 lbs since his surgery   Has not ate Feb 22nd. Start pureed foods Friday. Bowels are moving. HTN  Medications   Lisinopril  Need refill. BP controlled on medicine. Moods  Medications   Wellbutrin  Been seeing Dr Tucker Adler due to having gastric surgery. PHQ-9 Total Score: 0 (3/8/2023  1:40 PM)    /78   Pulse 87   Temp 97.3 °F (36.3 °C) (Temporal)   Ht 6' (1.829 m)   Wt (!) 369 lb (167.4 kg)   SpO2 98%   BMI 50.05 kg/m²     Review of Systems   Constitutional:  Negative for activity change, appetite change, fatigue, fever and unexpected weight change. HENT:  Negative for ear pain, rhinorrhea, sore throat and trouble swallowing. Eyes:  Negative for visual disturbance. Respiratory:  Negative for cough and shortness of breath. Cardiovascular:  Negative for chest pain, palpitations and leg swelling. Gastrointestinal:  Negative for abdominal pain, constipation, diarrhea, nausea and vomiting. Genitourinary:  Negative for flank pain. Musculoskeletal:  Negative for arthralgias, myalgias, neck pain and neck stiffness. Right great toe   Neurological:  Negative for headaches. Psychiatric/Behavioral:  Negative for decreased concentration and sleep disturbance. The patient is not nervous/anxious. Physical Exam  Vitals reviewed. Constitutional:       Appearance: Normal appearance. He is obese. HENT:      Head: Normocephalic. Eyes:      Conjunctiva/sclera: Conjunctivae normal.   Cardiovascular:      Rate and Rhythm: Normal rate and regular rhythm. Pulses: Normal pulses.       Heart sounds: Normal heart sounds. Pulmonary:      Effort: Pulmonary effort is normal.      Breath sounds: Normal breath sounds. Abdominal:      General: Bowel sounds are normal. There is no distension. Palpations: Abdomen is soft. Tenderness: There is no abdominal tenderness. There is no guarding. Musculoskeletal:      Cervical back: Normal range of motion and neck supple. Skin:     General: Skin is warm. Neurological:      Mental Status: He is alert and oriented to person, place, and time. An electronic signature was used to authenticate this note.     --LUKAS Briceño

## 2023-03-23 ENCOUNTER — TELEPHONE (OUTPATIENT)
Dept: BARIATRICS/WEIGHT MGMT | Facility: CLINIC | Age: 48
End: 2023-03-23
Payer: COMMERCIAL

## 2023-03-23 NOTE — TELEPHONE ENCOUNTER
Pt called in regard to his protein. He stated that his is now gagging when he tries to eat his protein. He has tried chicken and lean beef and this has happened 2 or 3 times. He has even tried eggs as well. First thing in the morning he also mentioned he gets some water and he tends to gag but thinks that's not so bad. Protein shakes has gotten better with keepig down now that he is making his own with unsweetened almond milk. Please advise.

## 2023-03-23 NOTE — TELEPHONE ENCOUNTER
Advised to try gas X and other protein options. He states he feels it is more gas pain causing the gagging versus the food. He will try this and see Dr Luo on Monday for his post operative f/u.

## 2023-03-27 ENCOUNTER — OFFICE VISIT (OUTPATIENT)
Dept: BARIATRICS/WEIGHT MGMT | Facility: CLINIC | Age: 48
End: 2023-03-27
Payer: COMMERCIAL

## 2023-03-27 VITALS
DIASTOLIC BLOOD PRESSURE: 70 MMHG | TEMPERATURE: 97.1 F | SYSTOLIC BLOOD PRESSURE: 110 MMHG | HEART RATE: 81 BPM | BODY MASS INDEX: 42.66 KG/M2 | HEIGHT: 72 IN | OXYGEN SATURATION: 99 % | WEIGHT: 315 LBS

## 2023-03-27 DIAGNOSIS — E66.01 CLASS 3 SEVERE OBESITY DUE TO EXCESS CALORIES WITH SERIOUS COMORBIDITY AND BODY MASS INDEX (BMI) OF 50.0 TO 59.9 IN ADULT: ICD-10-CM

## 2023-03-27 DIAGNOSIS — I10 PRIMARY HYPERTENSION: ICD-10-CM

## 2023-03-27 DIAGNOSIS — Z98.84 STATUS POST LAPAROSCOPIC SLEEVE GASTRECTOMY: Primary | ICD-10-CM

## 2023-03-27 PROCEDURE — 99024 POSTOP FOLLOW-UP VISIT: CPT | Performed by: SURGERY

## 2023-03-27 RX ORDER — DIPHENOXYLATE HYDROCHLORIDE AND ATROPINE SULFATE 2.5; .025 MG/1; MG/1
TABLET ORAL DAILY
Status: ON HOLD | COMMUNITY
End: 2023-04-06

## 2023-03-27 NOTE — PROGRESS NOTES
"  Patient Care Team:  Angelo Mckinnon APRN as PCP - General (Family Medicine)  Francie Cabrera APRN as Nurse Practitioner (Nurse Practitioner)    Subjective     Santiago Mcgraw is a 47 y.o. male.     Santiago is post op 1 month from his sleeve gastrectomy procedure.  He is currently on his regular diet.  He states he is consuming at least 60 ounces of fluid and 60 g of protein daily.  He exercises in the form of walking 3 times a week.  He does so for 20 to 30 minutes at a time.  He states that sometimes eating too fast will exacerbate reflux symptoms.    Review Of Systems:  General ROS: negative  Respiratory ROS: no cough, shortness of breath, or wheezing  Cardiovascular ROS: no chest pain or dyspnea on exertion  Gastrointestinal ROS: no abdominal pain, change in bowel habits, or black or bloody stools  positive for - heartburn    The following portions of the patient's history were reviewed and updated as appropriate: allergies, current medications, past family history, past medical history, past social history, past surgical history and problem list.    Objective   /70 (BP Location: Right arm, Patient Position: Sitting, Cuff Size: Adult)   Pulse 81   Temp 97.1 °F (36.2 °C)   Ht 182.9 cm (72\")   Wt (!) 159 kg (350 lb 3.2 oz)   SpO2 99%   BMI 47.50 kg/m²       03/27/23  0936   Weight: (!) 159 kg (350 lb 3.2 oz)        General Appearance:  awake, alert, oriented, in no acute distress  Lungs:  Normal expansion.  Clear to auscultation.  No rales, rhonchi, or wheezing.  Heart:  Heart regular rate and rhythm  Abdomen:  Soft, non-tender, normal bowel sounds; no bruits, organomegaly or masses.  Abnormal shape: obese  Wounds: clean, dry, intact    ASSESSMENT/ PLAN    Encounter Diagnoses   Name Primary?   • Status post laparoscopic sleeve gastrectomy Yes   • Class 3 severe obesity due to excess calories with serious comorbidity and body mass index (BMI) of 50.0 to 59.9 in adult ([facility])    • Primary " hypertension      Santiago Mcgraw and I discussed the importance of changing behavior for consistent and successful weight loss.  We first reviewed again the definition of a meal which is defined as portion sizes less than a half a cup and those portions incorporating a vegetable.  Specifically the vegetable should fill half of that volume.  I also explained that he should attempt to consume 4 meals as defined above daily and to avoid snacking or grazing.  He should also be mindful of adequate hydration by consuming at least 64 oz of water daily and incorporation of a regular exercise regimen.     I discussed the importance of taking his multivitamins as directed.  We discussed the importance to be aware of foods that might have an addictive component with their behavior.  Those that are considered addictive based off of how they are consumed should be avoided.  Specifically if there is limited nutritional value and the food choice it should be avoided and substituted with something that would provide more nutrition.    He is to return to our office 2 months or as needed.    03/27/23  11:01 CDT  Patient Care Team:  Angelo Mckinnon APRN as PCP - General (Family Medicine)  Francie Cabrera APRN as Nurse Practitioner (Nurse Practitioner)  Solitario Luo MD FACS

## 2023-03-30 ENCOUNTER — TELEPHONE (OUTPATIENT)
Dept: BARIATRICS/WEIGHT MGMT | Facility: CLINIC | Age: 48
End: 2023-03-30
Payer: COMMERCIAL

## 2023-03-30 RX ORDER — OMEPRAZOLE 40 MG/1
40 CAPSULE, DELAYED RELEASE ORAL DAILY
Qty: 30 CAPSULE | Refills: 0 | Status: SHIPPED | OUTPATIENT
Start: 2023-03-30 | End: 2023-04-29

## 2023-03-30 NOTE — TELEPHONE ENCOUNTER
I spoke with Dr. Luo and called patient back.  I have advised patient to begin omeprazole and to begin taking Pepcid over-the-counter.  Patient has been advised to go to a clear liquid diet for at least 3 days.  I encouraged patient to continue drinking slowly.  If patient has any worsening signs of dehydration such as very concentrated urine or tachycardia I encouraged him to call our office.  Patient verbalizes understanding.

## 2023-03-30 NOTE — TELEPHONE ENCOUNTER
Pt calling stating his reflux is getting worse and making him feel bad. Very uncomfortable. He stated he had some over the counter med he took for it but its no better. Pt stated with just a sip of water its coming back up. Pt requesting something to be called in if at all possible. He has no more omeprazole to take that was sent home after sx. Pt uses [facility].    Please advise

## 2023-03-30 NOTE — TELEPHONE ENCOUNTER
"I called patient and discussed symptoms. He states the first 30 days he did not notice any reflux symptoms. He completed the 30 day supply of omeprazole and started noticing reflux. He states the past two days \"have been absolutely miserable.\" I sent in 40 mg omeprazole and advised him to slow down eating which he states he has already been doing after your discussion. He states this morning water \"came back up\" after drinking a sip. I told him I would discuss this further with you and would reach back out. "

## 2023-04-03 ENCOUNTER — TELEPHONE (OUTPATIENT)
Dept: BARIATRICS/WEIGHT MGMT | Facility: CLINIC | Age: 48
End: 2023-04-03
Payer: COMMERCIAL

## 2023-04-03 NOTE — TELEPHONE ENCOUNTER
----- Message from Katiuska Moreno MA sent at 4/3/2023 11:50 AM CDT -----  Regarding: FW: Fever  Contact: [phone]    ----- Message -----  From: Santiago Mcgraw  Sent: 4/3/2023  11:40 AM CDT  To: [facility] Clinical Pool  Subject: Fever                                            Francie,  Spent a pretty miserable weekend between the extreme nausea from reflux and the absolute exhaustion of COVID.  Stuck with liquids all weekend (Gatorade, popsicles, water, lots of Sonic Ice).  I feel ‘better’ today… I tried some cereal this morning.  The reflux is still there and it really affects getting liquids down.  I’m doing my best to stay hydrated as well as I can.    My concerns are obviously being able to eat and drink again.  Regaining strength.  Figuring out how to consume enough water to make up ground I may have lost and remain healthy and hydrated.    April 3, 2023 at approximately 12 PM-contacted the patient with respect to the above statement.  He stated that he had developed fevers a few days prior and took a COVID test which subsequently came back positive.  I explained that the reflux might be associated with this however I have recommended that he start with a specific hydration regimen.  He states the Pepcid and the omeprazole have helped however he has consumed cereal this morning and his symptoms returned.  I explained to the patient that drinking and eating is something that can exacerbate reflux.  He did not realize that the cereal with the milk was doing such.  He now understands this and will avoid doing this.  Regardless I still provided him with the hydration plan.  I recommended that he do a pot of tea herbal prefer.  Incorporate natan root freshly sliced into the boiling water.  I then recommended that he choose a flavored tea that is herbal to his liking.  He may then jonathon it to taste per teaspoon dosages at a time.  He is to try this today and if tolerated advance his diet to homemade  "protein shakes utilizing his protein powder, spinach leaves and a fruit of choice preferably a banana.  He he states he is tolerating liquids and ice chips.  I explained that if this is related to \"COVID\" then his symptoms might continue for at least 2 weeks.  However I believe that his symptoms of \"COVID\" are improving due to him tolerating liquids better.  He may also try Gatorade and avoid artificial sweeteners at this time.  He is to follow-up with us tomorrow to assess his progress.  The patient conveyed verbal understanding and will comply per his statement.          "

## 2023-04-03 NOTE — TELEPHONE ENCOUNTER
Yeah I just wanted to address this message.  We did discuss options that day when you told me about it. So just I'm just following up with a message as well.

## 2023-04-04 ENCOUNTER — HOSPITAL ENCOUNTER (EMERGENCY)
Facility: HOSPITAL | Age: 48
Discharge: HOME | End: 2023-04-04
Admitting: SURGERY
Payer: COMMERCIAL

## 2023-04-04 ENCOUNTER — TELEPHONE (OUTPATIENT)
Dept: BARIATRICS/WEIGHT MGMT | Facility: CLINIC | Age: 48
End: 2023-04-04
Payer: COMMERCIAL

## 2023-04-04 VITALS
HEART RATE: 61 BPM | RESPIRATION RATE: 16 BRPM | DIASTOLIC BLOOD PRESSURE: 58 MMHG | OXYGEN SATURATION: 100 % | HEIGHT: 72 IN | BODY MASS INDEX: 42.66 KG/M2 | SYSTOLIC BLOOD PRESSURE: 119 MMHG | TEMPERATURE: 98.6 F | WEIGHT: 315 LBS

## 2023-04-04 DIAGNOSIS — R11.2 NAUSEA AND VOMITING, UNSPECIFIED VOMITING TYPE: Primary | ICD-10-CM

## 2023-04-04 PROCEDURE — 96360 HYDRATION IV INFUSION INIT: CPT

## 2023-04-04 PROCEDURE — 99282 EMERGENCY DEPT VISIT SF MDM: CPT

## 2023-04-04 RX ORDER — PROMETHAZINE HYDROCHLORIDE 12.5 MG/1
12.5 TABLET ORAL EVERY 8 HOURS PRN
Qty: 14 TABLET | Refills: 0 | Status: SHIPPED | OUTPATIENT
Start: 2023-04-04

## 2023-04-04 RX ORDER — ONDANSETRON 2 MG/ML
4 INJECTION INTRAMUSCULAR; INTRAVENOUS ONCE
Status: DISCONTINUED | OUTPATIENT
Start: 2023-04-04 | End: 2023-04-04

## 2023-04-04 RX ADMIN — SODIUM CHLORIDE, POTASSIUM CHLORIDE, SODIUM LACTATE AND CALCIUM CHLORIDE 1000 ML: 600; 310; 30; 20 INJECTION, SOLUTION INTRAVENOUS at 15:41

## 2023-04-04 NOTE — DISCHARGE SUMMARY
Date of Discharge:  4/4/2023    Discharge Diagnosis: No admission diagnoses are documented for this encounter.    Presenting Problem/History of Present Illness:  Nausea, and fatigue.  Tolerating clears however not adequately.        Postoperative course  Patient is a 47 y.o. male presented with status post laparoscopic sleeve gastrectomy.  Postoperatively the patient remained stable however the past 6 or 7 days he has been experiencing nausea and reflux symptoms.  At first he elicited fevers and tested himself for COVID which was positive.  This test was obtained approximately 6 days ago.  Since the symptoms did not improve with Antivert reflux medications and conservative methods he called us today for follow-up.  It was concerned that he was weaker and he even though tolerating clears he still was not tolerating them adequately and still feels fatigued.  It was recommended through discussion with the patient, his wife and myself to come in to be assessed.  The plan was to evaluate him and also start IV fluids.  The patient arrived and obtained vitals which were stable however I still decided to order IV fluids due to his statement that his urine was darker in appearance than normal and doing minimal with fluids.  He obtained the IV fluids and after an hour did feel better and his nausea mildly subsided as well.  He still remained stable.  The IVs were provided within an hour of time and he tolerated this well.  He was discharged home with instructions to proceed with clears, given a prescription for Phenergan and follow-up and 1 day.    I also explained that he should anticipate having voiding tonight and if not to give me a call.    Patient is remained stable and at this point in time I do not believe he requires IV labs.  I agree with canceling these labs and if symptoms do not improve or worsen he was instructed to return to be admitted.    Procedures Performed: IV fluids performed in the emergency  "department         Consults:   Consults     No orders found from 3/6/2023 to 4/5/2023.            Condition on Discharge:  Stable    Vital Signs  /67 (BP Location: Right arm, Patient Position: Sitting)   Pulse 70   Temp 98.5 °F (36.9 °C) (Oral)   Resp 18   Ht 182.9 cm (72\")   Wt (!) 153 kg (337 lb)   SpO2 99%   BMI 45.71 kg/m²     Physical Exam:  General Appearance alert, appears stated age and cooperative  Abdomen normal bowel sounds, no masses, no hepatomegaly, no splenomegaly, soft non-tender, no guarding and no rebound tenderness    Discharge Disposition  Home or Self Care    Discharge Medications     Discharge Medications      New Medications      Instructions Start Date   promethazine 12.5 MG tablet  Commonly known as: PHENERGAN   12.5 mg, Oral, Every 8 Hours PRN         Continue These Medications      Instructions Start Date   buPROPion  MG 24 hr tablet  Commonly known as: WELLBUTRIN XL   150 mg, Oral, Every Morning      CALCIUM PO   Oral, And d 3      lisinopril 20 MG tablet  Commonly known as: PRINIVIL,ZESTRIL   20 mg, Oral, Daily      multivitamin tablet tablet   Oral, Daily      omeprazole 40 MG capsule  Commonly known as: priLOSEC   40 mg, Oral, Daily      simethicone 80 MG chewable tablet  Commonly known as: Gas-X   Chew 0.5 tablet Every 6 (Six) Hours As Needed for Flatulence (belching).      VITAMIN B-12 PO   Oral         Stop These Medications    Diclofenac Sodium 1 % gel gel  Commonly known as: VOLTAREN            Discharge Diet: Clears advance as directed    Activity at Discharge: As directed    Follow-up Appointments  Future Appointments   Date Time Provider Department Center   5/23/2023  9:30 AM Solitario Luo MD [facility] None   8/22/2023  9:00 AM Francie Cabrera APRN [facility] None   2/26/2024  9:30 AM Solitario Luo MD [facility] None                Solitario Luo MD  04/04/23  16:38 CDT          "

## 2023-04-04 NOTE — TELEPHONE ENCOUNTER
Pt calling with an update:    States he is still nauseous. He tried the smoothie that was recommended and states he has drink about 3/4 of it. Its staying down for the most part but is having a lot of belching and feels as if it wants to come up. Nothing is going down smooth for him he stated.     The patient still has persistent nausea.  He denies hematochezia hematemesis or melena.  He denies abdominal pain.  He is able to tolerate ice chips and clears however his volumes of soft foods are very difficult to tolerate.  He will not throw them up but he does have symptoms of nausea and frothy juice that comes up after drinking his protein shakes.  After talking with him and his wife we all agreed that he should come in today and I stated I like to lay hands on him first.  He probably will be requiring IV fluids as well because as I explained to the family dehydration can cause nausea which makes it more difficult to try to get hydrated orally.  I will see him in the office and then have him be seen at the IV clinic for appropriate IV therapy/fluid.  The patient's wife and him are on their way within an hour's time.  Of note the were given the options for a local area however they rightfully is chosen to come to our facility for continued follow-up.

## 2023-04-05 ENCOUNTER — TELEPHONE (OUTPATIENT)
Dept: BARIATRICS/WEIGHT MGMT | Facility: CLINIC | Age: 48
End: 2023-04-05
Payer: COMMERCIAL

## 2023-04-05 ENCOUNTER — TELEPHONE (OUTPATIENT)
Dept: FAMILY MEDICINE CLINIC | Age: 48
End: 2023-04-05

## 2023-04-05 NOTE — TELEPHONE ENCOUNTER
Talk to Mr. Mcgraw this morning.  He stated he had voided and his urine is dark however he has had 2 bouts of urination since.  He is tolerating clears as well as teas.  He stated he is this morning already at approximately 14 ounces of fluids and doing well with that.  I explained to him and his okay to start his shakes and he may do a smoothie if he would like.  There is no limit to how much fluid he is consuming and I did recommend that he continue to drink water as tolerated.  He may monitor his urine color for improvement as well.  I encouraged him that he is progressing in the appropriate fashion and continue the course.  If there is any additional questions she is to call us as needed.

## 2023-04-06 ENCOUNTER — TELEPHONE (OUTPATIENT)
Dept: BARIATRICS/WEIGHT MGMT | Facility: CLINIC | Age: 48
End: 2023-04-06
Payer: COMMERCIAL

## 2023-04-06 ENCOUNTER — HOSPITAL ENCOUNTER (OUTPATIENT)
Facility: HOSPITAL | Age: 48
Discharge: HOME | End: 2023-04-08
Attending: SURGERY | Admitting: SURGERY
Payer: COMMERCIAL

## 2023-04-06 DIAGNOSIS — R11.14 BILIOUS VOMITING WITH NAUSEA: Primary | ICD-10-CM

## 2023-04-06 DIAGNOSIS — Z87.898 HISTORY OF HEARTBURN: ICD-10-CM

## 2023-04-06 DIAGNOSIS — A08.39 GASTROENTERITIS DUE TO COVID-19 VIRUS: ICD-10-CM

## 2023-04-06 DIAGNOSIS — Z98.84 STATUS POST LAPAROSCOPIC SLEEVE GASTRECTOMY: ICD-10-CM

## 2023-04-06 DIAGNOSIS — U07.1 GASTROENTERITIS DUE TO COVID-19 VIRUS: ICD-10-CM

## 2023-04-06 LAB
ALBUMIN SERPL-MCNC: 3.9 G/DL (ref 3.5–5.2)
ALBUMIN/GLOB SERPL: 1.3 G/DL
ALP SERPL-CCNC: 117 U/L (ref 39–117)
ALT SERPL W P-5'-P-CCNC: 46 U/L (ref 1–41)
ANION GAP SERPL CALCULATED.3IONS-SCNC: 14 MMOL/L (ref 5–15)
AST SERPL-CCNC: 34 U/L (ref 1–40)
BILIRUB SERPL-MCNC: 0.8 MG/DL (ref 0–1.2)
BUN SERPL-MCNC: 16 MG/DL (ref 6–20)
BUN/CREAT SERPL: 11.7 (ref 7–25)
CALCIUM SPEC-SCNC: 9 MG/DL (ref 8.6–10.5)
CHLORIDE SERPL-SCNC: 100 MMOL/L (ref 98–107)
CO2 SERPL-SCNC: 27 MMOL/L (ref 22–29)
CREAT SERPL-MCNC: 1.37 MG/DL (ref 0.76–1.27)
DEPRECATED RDW RBC AUTO: 42.5 FL (ref 37–54)
EGFRCR SERPLBLD CKD-EPI 2021: 63.6 ML/MIN/1.73
ERYTHROCYTE [DISTWIDTH] IN BLOOD BY AUTOMATED COUNT: 14.6 % (ref 12.3–15.4)
GLOBULIN UR ELPH-MCNC: 3 GM/DL
GLUCOSE SERPL-MCNC: 115 MG/DL (ref 65–99)
HCT VFR BLD AUTO: 43.5 % (ref 37.5–51)
HGB BLD-MCNC: 14.2 G/DL (ref 13–17.7)
MCH RBC QN AUTO: 26.6 PG (ref 26.6–33)
MCHC RBC AUTO-ENTMCNC: 32.6 G/DL (ref 31.5–35.7)
MCV RBC AUTO: 81.5 FL (ref 79–97)
PLATELET # BLD AUTO: 183 10*3/MM3 (ref 140–450)
PMV BLD AUTO: 11.1 FL (ref 6–12)
POTASSIUM SERPL-SCNC: 3.6 MMOL/L (ref 3.5–5.2)
PROT SERPL-MCNC: 6.9 G/DL (ref 6–8.5)
RBC # BLD AUTO: 5.34 10*6/MM3 (ref 4.14–5.8)
SARS-COV-2 RNA RESP QL NAA+PROBE: NOT DETECTED
SODIUM SERPL-SCNC: 141 MMOL/L (ref 136–145)
WBC NRBC COR # BLD: 8.93 10*3/MM3 (ref 3.4–10.8)

## 2023-04-06 PROCEDURE — G0378 HOSPITAL OBSERVATION PER HR: HCPCS

## 2023-04-06 PROCEDURE — 96361 HYDRATE IV INFUSION ADD-ON: CPT

## 2023-04-06 PROCEDURE — 80053 COMPREHEN METABOLIC PANEL: CPT | Performed by: SURGERY

## 2023-04-06 PROCEDURE — 25010000002 ONDANSETRON PER 1 MG: Performed by: SURGERY

## 2023-04-06 PROCEDURE — 96376 TX/PRO/DX INJ SAME DRUG ADON: CPT

## 2023-04-06 PROCEDURE — 96374 THER/PROPH/DIAG INJ IV PUSH: CPT

## 2023-04-06 PROCEDURE — 25010000002 DEXAMETHASONE PER 1 MG: Performed by: SURGERY

## 2023-04-06 PROCEDURE — 85027 COMPLETE CBC AUTOMATED: CPT | Performed by: SURGERY

## 2023-04-06 PROCEDURE — C9803 HOPD COVID-19 SPEC COLLECT: HCPCS

## 2023-04-06 PROCEDURE — 96375 TX/PRO/DX INJ NEW DRUG ADDON: CPT

## 2023-04-06 PROCEDURE — 87635 SARS-COV-2 COVID-19 AMP PRB: CPT | Performed by: SURGERY

## 2023-04-06 RX ORDER — SIMETHICONE 80 MG
40 TABLET,CHEWABLE ORAL 4 TIMES DAILY PRN
Status: DISCONTINUED | OUTPATIENT
Start: 2023-04-06 | End: 2023-04-08 | Stop reason: HOSPADM

## 2023-04-06 RX ORDER — SIMETHICONE 80 MG
40 TABLET,CHEWABLE ORAL EVERY 6 HOURS PRN
Qty: 30 TABLET | Refills: 1 | Status: SHIPPED | OUTPATIENT
Start: 2023-04-06 | End: 2023-04-06

## 2023-04-06 RX ORDER — ACETAMINOPHEN 160 MG/5ML
325 SOLUTION ORAL EVERY 6 HOURS
Status: DISCONTINUED | OUTPATIENT
Start: 2023-04-06 | End: 2023-04-07

## 2023-04-06 RX ORDER — ONDANSETRON 4 MG/1
4 TABLET, ORALLY DISINTEGRATING ORAL EVERY 8 HOURS PRN
COMMUNITY

## 2023-04-06 RX ORDER — DEXAMETHASONE SODIUM PHOSPHATE 4 MG/ML
4 INJECTION, SOLUTION INTRA-ARTICULAR; INTRALESIONAL; INTRAMUSCULAR; INTRAVENOUS; SOFT TISSUE EVERY 8 HOURS PRN
Status: DISCONTINUED | OUTPATIENT
Start: 2023-04-06 | End: 2023-04-08 | Stop reason: HOSPADM

## 2023-04-06 RX ORDER — DIPHENHYDRAMINE HYDROCHLORIDE 50 MG/ML
25 INJECTION INTRAMUSCULAR; INTRAVENOUS EVERY 6 HOURS PRN
Status: DISCONTINUED | OUTPATIENT
Start: 2023-04-06 | End: 2023-04-06

## 2023-04-06 RX ORDER — AMISULPRIDE 2.5 MG/ML
5 INJECTION, SOLUTION INTRAVENOUS ONCE AS NEEDED
Qty: 2 ML | Refills: 0 | Status: SHIPPED | OUTPATIENT
Start: 2023-04-06 | End: 2023-04-06

## 2023-04-06 RX ORDER — KETOROLAC TROMETHAMINE 30 MG/ML
30 INJECTION, SOLUTION INTRAMUSCULAR; INTRAVENOUS EVERY 6 HOURS PRN
Status: DISCONTINUED | OUTPATIENT
Start: 2023-04-06 | End: 2023-04-07

## 2023-04-06 RX ORDER — ONDANSETRON 2 MG/ML
4 INJECTION INTRAMUSCULAR; INTRAVENOUS EVERY 6 HOURS
Status: DISCONTINUED | OUTPATIENT
Start: 2023-04-06 | End: 2023-04-06

## 2023-04-06 RX ORDER — DEXAMETHASONE SODIUM PHOSPHATE 4 MG/ML
4 INJECTION, SOLUTION INTRA-ARTICULAR; INTRALESIONAL; INTRAMUSCULAR; INTRAVENOUS; SOFT TISSUE ONCE
Status: COMPLETED | OUTPATIENT
Start: 2023-04-06 | End: 2023-04-06

## 2023-04-06 RX ORDER — ONDANSETRON 4 MG/1
4 TABLET, FILM COATED ORAL EVERY 8 HOURS PRN
Status: ON HOLD | COMMUNITY
End: 2023-04-06

## 2023-04-06 RX ORDER — ONDANSETRON 2 MG/ML
4 INJECTION INTRAMUSCULAR; INTRAVENOUS EVERY 6 HOURS
Status: DISCONTINUED | OUTPATIENT
Start: 2023-04-06 | End: 2023-04-07

## 2023-04-06 RX ORDER — SODIUM CHLORIDE, SODIUM LACTATE, POTASSIUM CHLORIDE, CALCIUM CHLORIDE 600; 310; 30; 20 MG/100ML; MG/100ML; MG/100ML; MG/100ML
75 INJECTION, SOLUTION INTRAVENOUS CONTINUOUS
Status: DISCONTINUED | OUTPATIENT
Start: 2023-04-06 | End: 2023-04-08

## 2023-04-06 RX ADMIN — ACETAMINOPHEN 325 MG: 325 SUSPENSION ORAL at 21:37

## 2023-04-06 RX ADMIN — DEXAMETHASONE SODIUM PHOSPHATE 4 MG: 4 INJECTION, SOLUTION INTRA-ARTICULAR; INTRALESIONAL; INTRAMUSCULAR; INTRAVENOUS; SOFT TISSUE at 16:34

## 2023-04-06 RX ADMIN — ACETAMINOPHEN 325 MG: 325 SUSPENSION ORAL at 15:42

## 2023-04-06 RX ADMIN — SODIUM CHLORIDE, POTASSIUM CHLORIDE, SODIUM LACTATE AND CALCIUM CHLORIDE 100 ML/HR: 600; 310; 30; 20 INJECTION, SOLUTION INTRAVENOUS at 15:42

## 2023-04-06 RX ADMIN — ONDANSETRON 4 MG: 2 INJECTION INTRAMUSCULAR; INTRAVENOUS at 15:42

## 2023-04-06 RX ADMIN — ONDANSETRON 4 MG: 2 INJECTION INTRAMUSCULAR; INTRAVENOUS at 21:36

## 2023-04-06 NOTE — PLAN OF CARE
Goal Outcome Evaluation:      Pt to room 364 as direct admit with c/o unable to tolerate diet at home. Pt alert and oriented. Denies pain. Spouse at bedside. Will continue to monitor pt.

## 2023-04-06 NOTE — TELEPHONE ENCOUNTER
Received a text today from the patient's wife.  I reached out to her this afternoon and she had concerns of her 's progression with respect to his nutrition and his oral intake.  She stated he is still nauseated.  She stated after he was consuming teas which she is tired of he advance to mashed potatoes.  She stated that after eating the mashed potatoes he subsequently vomited up what might have been his previous shift smoothly shake or bile.  She was concerned about his weakness.  She did state that he is tolerating liquids however is still nauseated and weak.  Respectfully and recommended the patient come in for admission.  He will be admitted as explained to his wife so that we can closely monitor his progression.  The etiology of his nausea still might be secondary to him recovering from a viral infection and/or dehydration however regardless due to him not progressing I recommended that he be admitted.  Labs and IV fluids will be started as well as antiemetics.  The patient's wife agreed to bring him in.

## 2023-04-06 NOTE — H&P
Patient Care Team:  Angelo Mckinnon APRN as PCP - General (Family Medicine)  Francie Cabrera APRN as Nurse Practitioner (Nurse Practitioner)    Chief Complaint: S/P laparoscopic sleeve gastrectomy.  The patient now complains of nausea and vomiting that is been continuous with reflux for approximately 8 days    Subjective     Santiago Mcgraw is POD starting from February 23, 2023 which is approximately 1-1/2 months ago with approximately 8 days of consistent nausea and vomiting.  Again it is presumed that it started with a COVID infection which was tested positive during the sequela I.  The patient steadily had advance his diet prior to this incident to a stage IV tolerating ground meats and chicken however since this exacerbation he is barely been able to tolerate clears.  He was seen in the emergency department on 4/4/2023 after receiving IV fluids.  He was tolerating clears well but stage II not as well the following day however when he progressed to stage III he could barely tolerate any p.o.  For example when only putting a small bit of mashed potatoes in his mouth he immediately threw that up per his statement.  He also stated that his vomit appeared to be green in nature prior to this.  He denies abdominal pain fevers chills chest pain or shortness of breath.  He does complain of nausea vomiting and fatigue.  His wife communicated with me through text this information and I instructed them to come in to be admitted for IV therapy, lab work and observation status.       Review of Systems:     Review of Systems - General ROS: positive for  - fatigue and weight loss  Respiratory ROS: no cough, shortness of breath, or wheezing  Cardiovascular ROS: no chest pain or dyspnea on exertion  Gastrointestinal ROS: no abdominal pain, change in bowel habits, or black or bloody stools  positive for - nausea/vomiting    Objective     Vital Signs  There were no vitals taken for this visit.    Physical Exam:    HEENT:  extra ocular movement intact and sclera clear, anicteric  Respiratory: appears well, vitals normal, no respiratory distress, acyanotic, normal RR, chest clear, no wheezing, crepitations, rhonchi, normal symmetric air entry  Cardiovascular: Regular rate and rhythm, S1, S2 normal, no murmur, click, rub or gallop  GI: Soft, non-tender, normal bowel sounds; no bruits, organomegaly or masses.  Abnormal shape: obese  Musculoskeletal: inspection - no abnormality  Neurologic: alert, oriented, normal speech, no focal findings or movement disorder noted     Results Review:    Pending    Medication Reviewed:   No current facility-administered medications for this encounter.       Assessment & Plan  POD about a month ago from the lap sleeve gastrectomy presents with GERD, possible gastroenteritis causing nausea and vomiting.  Plan: He will be admitted in observation status, started on IV fluids Labs will be drawn and antiemetics started.  I am concerned that the chronic nausea and vomiting may have exacerbated his sleeve with respect to causing swelling.  I will start him on Decadron x1 as well and ice chips and sips tonight.  I will advance his diet in the a.m. pending his tolerance of his clears.    Solitario Luo MD  04/06/23  14:45 CDT

## 2023-04-07 PROBLEM — K52.9 GASTROENTERITIS: Status: ACTIVE | Noted: 2023-04-07

## 2023-04-07 LAB
ALBUMIN SERPL-MCNC: 3.6 G/DL (ref 3.5–5.2)
ALBUMIN/GLOB SERPL: 1.3 G/DL
ALP SERPL-CCNC: 105 U/L (ref 39–117)
ALT SERPL W P-5'-P-CCNC: 43 U/L (ref 1–41)
ANION GAP SERPL CALCULATED.3IONS-SCNC: 12 MMOL/L (ref 5–15)
AST SERPL-CCNC: 30 U/L (ref 1–40)
BILIRUB SERPL-MCNC: 0.8 MG/DL (ref 0–1.2)
BUN SERPL-MCNC: 19 MG/DL (ref 6–20)
BUN/CREAT SERPL: 14.7 (ref 7–25)
CALCIUM SPEC-SCNC: 8.8 MG/DL (ref 8.6–10.5)
CHLORIDE SERPL-SCNC: 99 MMOL/L (ref 98–107)
CO2 SERPL-SCNC: 26 MMOL/L (ref 22–29)
CREAT SERPL-MCNC: 1.29 MG/DL (ref 0.76–1.27)
DEPRECATED RDW RBC AUTO: 44.2 FL (ref 37–54)
EGFRCR SERPLBLD CKD-EPI 2021: 68.4 ML/MIN/1.73
ERYTHROCYTE [DISTWIDTH] IN BLOOD BY AUTOMATED COUNT: 14.6 % (ref 12.3–15.4)
GLOBULIN UR ELPH-MCNC: 2.8 GM/DL
GLUCOSE SERPL-MCNC: 123 MG/DL (ref 65–99)
HCT VFR BLD AUTO: 39.6 % (ref 37.5–51)
HGB BLD-MCNC: 13 G/DL (ref 13–17.7)
MCH RBC QN AUTO: 27.1 PG (ref 26.6–33)
MCHC RBC AUTO-ENTMCNC: 32.8 G/DL (ref 31.5–35.7)
MCV RBC AUTO: 82.5 FL (ref 79–97)
PLATELET # BLD AUTO: 172 10*3/MM3 (ref 140–450)
PMV BLD AUTO: 11.5 FL (ref 6–12)
POTASSIUM SERPL-SCNC: 3.8 MMOL/L (ref 3.5–5.2)
PREALB SERPL-MCNC: 17.5 MG/DL (ref 20–40)
PROT SERPL-MCNC: 6.4 G/DL (ref 6–8.5)
RBC # BLD AUTO: 4.8 10*6/MM3 (ref 4.14–5.8)
SODIUM SERPL-SCNC: 137 MMOL/L (ref 136–145)
WBC NRBC COR # BLD: 8.35 10*3/MM3 (ref 3.4–10.8)

## 2023-04-07 PROCEDURE — G0378 HOSPITAL OBSERVATION PER HR: HCPCS

## 2023-04-07 PROCEDURE — 96361 HYDRATE IV INFUSION ADD-ON: CPT

## 2023-04-07 PROCEDURE — 80053 COMPREHEN METABOLIC PANEL: CPT | Performed by: SURGERY

## 2023-04-07 PROCEDURE — 96376 TX/PRO/DX INJ SAME DRUG ADON: CPT

## 2023-04-07 PROCEDURE — 96375 TX/PRO/DX INJ NEW DRUG ADDON: CPT

## 2023-04-07 PROCEDURE — 25010000002 ONDANSETRON PER 1 MG: Performed by: SURGERY

## 2023-04-07 PROCEDURE — 85027 COMPLETE CBC AUTOMATED: CPT | Performed by: SURGERY

## 2023-04-07 PROCEDURE — 84134 ASSAY OF PREALBUMIN: CPT | Performed by: SURGERY

## 2023-04-07 RX ORDER — PANTOPRAZOLE SODIUM 40 MG/10ML
40 INJECTION, POWDER, LYOPHILIZED, FOR SOLUTION INTRAVENOUS ONCE
Status: COMPLETED | OUTPATIENT
Start: 2023-04-07 | End: 2023-04-07

## 2023-04-07 RX ADMIN — ONDANSETRON 4 MG: 2 INJECTION INTRAMUSCULAR; INTRAVENOUS at 10:12

## 2023-04-07 RX ADMIN — ACETAMINOPHEN 325 MG: 325 SUSPENSION ORAL at 16:21

## 2023-04-07 RX ADMIN — ACETAMINOPHEN 325 MG: 325 SUSPENSION ORAL at 04:56

## 2023-04-07 RX ADMIN — SODIUM CHLORIDE, POTASSIUM CHLORIDE, SODIUM LACTATE AND CALCIUM CHLORIDE 100 ML/HR: 600; 310; 30; 20 INJECTION, SOLUTION INTRAVENOUS at 01:55

## 2023-04-07 RX ADMIN — SODIUM CHLORIDE, POTASSIUM CHLORIDE, SODIUM LACTATE AND CALCIUM CHLORIDE 75 ML/HR: 600; 310; 30; 20 INJECTION, SOLUTION INTRAVENOUS at 20:40

## 2023-04-07 RX ADMIN — ACETAMINOPHEN 325 MG: 325 SUSPENSION ORAL at 10:12

## 2023-04-07 RX ADMIN — SODIUM CHLORIDE, POTASSIUM CHLORIDE, SODIUM LACTATE AND CALCIUM CHLORIDE 100 ML/HR: 600; 310; 30; 20 INJECTION, SOLUTION INTRAVENOUS at 11:52

## 2023-04-07 RX ADMIN — ONDANSETRON 4 MG: 2 INJECTION INTRAMUSCULAR; INTRAVENOUS at 04:56

## 2023-04-07 RX ADMIN — ONDANSETRON 4 MG: 2 INJECTION INTRAMUSCULAR; INTRAVENOUS at 16:21

## 2023-04-07 RX ADMIN — PANTOPRAZOLE SODIUM 40 MG: 40 INJECTION, POWDER, FOR SOLUTION INTRAVENOUS at 20:40

## 2023-04-07 NOTE — PLAN OF CARE
Goal Outcome Evaluation:  Plan of Care Reviewed With: patient        Progress: no change  Outcome Evaluation: Patient has had no c/o pain so far this shift. Voiding. Up ad terrence. IVF infusing. No emesis so far this shift.

## 2023-04-07 NOTE — PLAN OF CARE
Goal Outcome Evaluation:   A&O. RA. Patient has been resting throughout the day. Pt complained of nausea after intakes of clear liquids. No reports of pain or fever. Dark morgan concentrated urine noted. SCDs in bed, ambulation encouraged. IV fluids infusing. VSS.

## 2023-04-08 VITALS
RESPIRATION RATE: 18 BRPM | HEIGHT: 72 IN | WEIGHT: 315 LBS | SYSTOLIC BLOOD PRESSURE: 134 MMHG | HEART RATE: 56 BPM | BODY MASS INDEX: 42.66 KG/M2 | DIASTOLIC BLOOD PRESSURE: 62 MMHG | TEMPERATURE: 98.2 F | OXYGEN SATURATION: 100 %

## 2023-04-08 LAB
ALBUMIN SERPL-MCNC: 3.5 G/DL (ref 3.5–5.2)
ALBUMIN/GLOB SERPL: 1.6 G/DL
ALP SERPL-CCNC: 93 U/L (ref 39–117)
ALT SERPL W P-5'-P-CCNC: 44 U/L (ref 1–41)
ANION GAP SERPL CALCULATED.3IONS-SCNC: 13 MMOL/L (ref 5–15)
AST SERPL-CCNC: 28 U/L (ref 1–40)
BILIRUB SERPL-MCNC: 0.7 MG/DL (ref 0–1.2)
BUN SERPL-MCNC: 15 MG/DL (ref 6–20)
BUN/CREAT SERPL: 12 (ref 7–25)
CALCIUM SPEC-SCNC: 8.7 MG/DL (ref 8.6–10.5)
CHLORIDE SERPL-SCNC: 102 MMOL/L (ref 98–107)
CO2 SERPL-SCNC: 27 MMOL/L (ref 22–29)
CREAT SERPL-MCNC: 1.25 MG/DL (ref 0.76–1.27)
EGFRCR SERPLBLD CKD-EPI 2021: 71 ML/MIN/1.73
GLOBULIN UR ELPH-MCNC: 2.2 GM/DL
GLUCOSE SERPL-MCNC: 94 MG/DL (ref 65–99)
POTASSIUM SERPL-SCNC: 3.5 MMOL/L (ref 3.5–5.2)
PROT SERPL-MCNC: 5.7 G/DL (ref 6–8.5)
SODIUM SERPL-SCNC: 142 MMOL/L (ref 136–145)

## 2023-04-08 PROCEDURE — 96361 HYDRATE IV INFUSION ADD-ON: CPT

## 2023-04-08 PROCEDURE — G0378 HOSPITAL OBSERVATION PER HR: HCPCS

## 2023-04-08 PROCEDURE — 80053 COMPREHEN METABOLIC PANEL: CPT | Performed by: SURGERY

## 2023-04-08 RX ADMIN — SODIUM CHLORIDE, POTASSIUM CHLORIDE, SODIUM LACTATE AND CALCIUM CHLORIDE 75 ML/HR: 600; 310; 30; 20 INJECTION, SOLUTION INTRAVENOUS at 08:36

## 2023-04-08 NOTE — DISCHARGE SUMMARY
"  Date of Discharge:  4/8/2023    Discharge Diagnosis: Gastroenteritis [K52.9]    Presenting Problem/History of Present Illness  Gastroenteritis [K52.9]       Hospital Course  Patient is a 47 y.o. male presented with nausea, GERD and vomiting postoperatively.  The patient's hospital course remained stable however he had difficulties with belching of acid in his reflux.  His nausea subsided after his first night in the hospital however his second night he had 1 episode of emesis.  This a.m on discharge the patient has been tolerating his protein shakes without being diluted well.  No issues with nausea.  He of note did receive his antiacid medication prior to this.  I reviewed and discussed his laboratory results which had been improving.  His episode of vomiting/emesis of what was described as gastric contents nonbloody was resolved after him walking the hallway last night.  He has since been up and ambulating.  Of note he had voided a significant amount of urine however he did not catch it in the urine container due to the urgency of having to go.  He stated he feels better.  I did explain to the patient that GERD/reflux symptoms can progress if eating too fast.  Reiterating the sleeve gastrectomy can promote GERD as a side effect and this could last indefinitely.  Upon discharge I explained the postoperative dietary advancement course recommending that he consider diluting the protein shakes.  He is also to follow-up with us on the 17th at 8:45 AM.  He was discharged home in a stable condition with above instructions.      Procedures Performed         Consults:   Consults     No orders found from 3/8/2023 to 4/7/2023.            Condition on Discharge:  Stable    Vital Signs  /62 (BP Location: Left arm, Patient Position: Sitting)   Pulse 56   Temp 98.2 °F (36.8 °C) (Oral)   Resp 18   Ht 182.9 cm (72\")   Wt (!) 153 kg (337 lb)   SpO2 100%   BMI 45.71 kg/m²     Physical Exam:  General Appearance alert, " appears stated age and cooperative  Lungs clear to auscultation, respirations regular, respirations even and respirations unlabored  Heart regular rhythm & normal rate, normal S1, S2, no murmur, no gallop, no rub and no click  Abdomen normal bowel sounds, no masses, no hepatomegaly, no splenomegaly, soft non-tender, no guarding and no rebound tenderness    Discharge Disposition  Home or Self Care    Discharge Medications     Discharge Medications      Continue These Medications      Instructions Start Date   buPROPion  MG 24 hr tablet  Commonly known as: WELLBUTRIN XL   150 mg, Oral, Every Morning      lisinopril 20 MG tablet  Commonly known as: PRINIVIL,ZESTRIL   20 mg, Oral, Daily      NON FORMULARY   1 each, Transdermal, Daily, Calcium patch (PatchAid)      NON FORMULARY   1 each, Transdermal, Daily, Vitamin B-12 patch (PatchAid)      NON FORMULARY   1 each, Transdermal, Daily, Multivitamin Patch (PatchAid)      omeprazole 40 MG capsule  Commonly known as: priLOSEC   40 mg, Oral, Daily      ondansetron ODT 4 MG disintegrating tablet  Commonly known as: ZOFRAN-ODT   4 mg, Translingual, Every 8 Hours PRN      promethazine 12.5 MG tablet  Commonly known as: PHENERGAN   12.5 mg, Oral, Every 8 Hours PRN             Discharge Diet: Stage II advance according to recommendations    Activity at Discharge: Continue to ambulate as tolerated    Follow-up Appointments  Future Appointments   Date Time Provider Department Center   5/23/2023  9:30 AM [place]it[place] Lu[place] [facility] [place] [facility] [place]   8/22/2023  9:00 AM Francie Cabrera [place]N [facility] [place] [facility] [place]   2/26/2024  9:30 AM Solitario Luo MD [facility] None         Test Results Pending at Discharge none       Solitario Luo MD  04/08/23  09:58 CDT

## 2023-04-08 NOTE — PLAN OF CARE
Goal Outcome Evaluation:  Plan of Care Reviewed With: patient  Progress: no change         Pt with no c/o pain thus far this shift. IVF cont to infuse per order. Up ad terrence. Pt with c/o reflux earlier in shift; noted to have small amount of acid thrown up when out for walk. Voiding. IV Protonix x1 per order. VSS. Safety maintained.

## 2023-04-08 NOTE — PROGRESS NOTES
"Patient Care Team:  Angelo Mckinnon APRN as PCP - General (Family Medicine)  Francie Cabrera APRN as Nurse Practitioner (Nurse Practitioner)    Chief Complaint: S/P lap sleeve gastrectomy with subsequent viral infection causing nausea and vomiting and GERD.  Which then led to dehydration and necessitating admission due to inadequate oral intake.    Subjective     Santiago Mcgraw is POD about a month ago from the above.  Patient today states his nausea has improved however does have symptoms of belching after consuming liquids.  No nausea episodes today.  He has been tolerating clears.  Chief complaint now is the belching of juices which appears to be consistent with GERD at this time.  He has been voiding and ambulating.  The patient has brought his own CPAP machine during his hospital stay.       Review of Systems:     Review of Systems - General ROS: positive for  - fatigue  Respiratory ROS: no cough, shortness of breath, or wheezing  Cardiovascular ROS: no chest pain or dyspnea on exertion  Gastrointestinal ROS: no abdominal pain, change in bowel habits, or black or bloody stools  positive for - gas/bloating and heartburn    Objective     Vital Signs  /62 (BP Location: Left arm, Patient Position: Lying)   Pulse 63   Temp 97.9 °F (36.6 °C) (Oral)   Resp 16   Ht 182.9 cm (72\")   Wt (!) 153 kg (337 lb)   SpO2 98%   BMI 45.71 kg/m²     Physical Exam:    HEENT: extra ocular movement intact and sclera clear, anicteric  Respiratory: appears well, vitals normal, no respiratory distress, acyanotic, normal RR, chest clear, no wheezing, crepitations, rhonchi, normal symmetric air entry  Cardiovascular: Regular rate and rhythm, S1, S2 normal, no murmur, click, rub or gallop  GI: Soft, non-tender, normal bowel sounds; no bruits, organomegaly or masses.  Abnormal shape: obese  Musculoskeletal: inspection - no abnormality  Neurologic: alert, oriented, normal speech, no focal findings or movement disorder " noted     Results Review:    Labs reviewed    Medication Reviewed:   Current Facility-Administered Medications   Medication Dose Route Frequency Provider Last Rate Last Admin   • dexamethasone (DECADRON) injection 4 mg  4 mg Intravenous Q8H PRN Solitario Luo MD       • lactated ringers infusion  100 mL/hr Intravenous Continuous Solitario Luo  mL/hr at 04/07/23 1152 100 mL/hr at 04/07/23 1152   • pantoprazole (PROTONIX) injection 40 mg  40 mg Intravenous Once Solitario Luo MD       • simethicone (MYLICON) chewable tablet 40 mg  40 mg Oral 4x Daily PRN Solitario Luo MD           Assessment & Plan  POD about a month ago from sleeve gastrectomy with subsequent gastroenteritis and dehydration.  Patient is remained stable as well as his labs.  Creatinine was elevated but trending down.  Oral intake has improved on clears I will advance his diet to stage II tonight.  Repeat his labs in a.m. possible discharge home soon.  Patient is encouraged to continue to ambulate and utilize his incentive spirometer.    Solitario Luo MD  04/07/23  19:14 CDT

## 2023-04-10 RX ORDER — PANTOPRAZOLE SODIUM 40 MG/1
40 TABLET, DELAYED RELEASE ORAL DAILY
Qty: 30 TABLET | Refills: 1 | Status: SHIPPED | OUTPATIENT
Start: 2023-04-10

## 2023-04-13 ENCOUNTER — APPOINTMENT (OUTPATIENT)
Dept: GENERAL RADIOLOGY | Facility: HOSPITAL | Age: 48
End: 2023-04-13
Payer: COMMERCIAL

## 2023-04-13 ENCOUNTER — HOSPITAL ENCOUNTER (EMERGENCY)
Facility: HOSPITAL | Age: 48
Discharge: HOME | End: 2023-04-13
Attending: EMERGENCY MEDICINE | Admitting: EMERGENCY MEDICINE
Payer: COMMERCIAL

## 2023-04-13 ENCOUNTER — APPOINTMENT (OUTPATIENT)
Dept: ULTRASOUND IMAGING | Facility: HOSPITAL | Age: 48
End: 2023-04-13
Payer: COMMERCIAL

## 2023-04-13 VITALS
HEIGHT: 72 IN | TEMPERATURE: 100 F | BODY MASS INDEX: 42.66 KG/M2 | SYSTOLIC BLOOD PRESSURE: 154 MMHG | HEART RATE: 83 BPM | DIASTOLIC BLOOD PRESSURE: 91 MMHG | WEIGHT: 315 LBS | OXYGEN SATURATION: 100 % | RESPIRATION RATE: 19 BRPM

## 2023-04-13 DIAGNOSIS — E66.01 MORBID OBESITY: ICD-10-CM

## 2023-04-13 DIAGNOSIS — M10.072 ACUTE IDIOPATHIC GOUT OF LEFT FOOT: Primary | ICD-10-CM

## 2023-04-13 LAB
ANION GAP SERPL CALCULATED.3IONS-SCNC: 17 MMOL/L (ref 5–15)
BASOPHILS # BLD AUTO: 0.03 10*3/MM3 (ref 0–0.2)
BASOPHILS NFR BLD AUTO: 0.3 % (ref 0–1.5)
BUN SERPL-MCNC: 13 MG/DL (ref 6–20)
BUN/CREAT SERPL: 12.6 (ref 7–25)
CALCIUM SPEC-SCNC: 9.5 MG/DL (ref 8.6–10.5)
CHLORIDE SERPL-SCNC: 100 MMOL/L (ref 98–107)
CO2 SERPL-SCNC: 23 MMOL/L (ref 22–29)
CREAT SERPL-MCNC: 1.03 MG/DL (ref 0.76–1.27)
CRP SERPL-MCNC: 5.08 MG/DL (ref 0–0.5)
DEPRECATED RDW RBC AUTO: 45.1 FL (ref 37–54)
EGFRCR SERPLBLD CKD-EPI 2021: 89.6 ML/MIN/1.73
EOSINOPHIL # BLD AUTO: 0.12 10*3/MM3 (ref 0–0.4)
EOSINOPHIL NFR BLD AUTO: 1 % (ref 0.3–6.2)
ERYTHROCYTE [DISTWIDTH] IN BLOOD BY AUTOMATED COUNT: 15.4 % (ref 12.3–15.4)
GLUCOSE SERPL-MCNC: 102 MG/DL (ref 65–99)
HCT VFR BLD AUTO: 42.4 % (ref 37.5–51)
HGB BLD-MCNC: 13.9 G/DL (ref 13–17.7)
IMM GRANULOCYTES # BLD AUTO: 0.05 10*3/MM3 (ref 0–0.05)
IMM GRANULOCYTES NFR BLD AUTO: 0.4 % (ref 0–0.5)
LYMPHOCYTES # BLD AUTO: 0.92 10*3/MM3 (ref 0.7–3.1)
LYMPHOCYTES NFR BLD AUTO: 8 % (ref 19.6–45.3)
MCH RBC QN AUTO: 26.9 PG (ref 26.6–33)
MCHC RBC AUTO-ENTMCNC: 32.8 G/DL (ref 31.5–35.7)
MCV RBC AUTO: 82 FL (ref 79–97)
MONOCYTES # BLD AUTO: 1.08 10*3/MM3 (ref 0.1–0.9)
MONOCYTES NFR BLD AUTO: 9.4 % (ref 5–12)
NEUTROPHILS NFR BLD AUTO: 80.9 % (ref 42.7–76)
NEUTROPHILS NFR BLD AUTO: 9.31 10*3/MM3 (ref 1.7–7)
NRBC BLD AUTO-RTO: 0 /100 WBC (ref 0–0.2)
PLATELET # BLD AUTO: 172 10*3/MM3 (ref 140–450)
PMV BLD AUTO: 11.3 FL (ref 6–12)
POTASSIUM SERPL-SCNC: 3.9 MMOL/L (ref 3.5–5.2)
PROCALCITONIN SERPL-MCNC: 0.12 NG/ML (ref 0–0.25)
RBC # BLD AUTO: 5.17 10*6/MM3 (ref 4.14–5.8)
SODIUM SERPL-SCNC: 140 MMOL/L (ref 136–145)
URATE SERPL-MCNC: 13 MG/DL (ref 3.4–7)
WBC NRBC COR # BLD: 11.51 10*3/MM3 (ref 3.4–10.8)

## 2023-04-13 PROCEDURE — 96374 THER/PROPH/DIAG INJ IV PUSH: CPT

## 2023-04-13 PROCEDURE — 84550 ASSAY OF BLOOD/URIC ACID: CPT | Performed by: EMERGENCY MEDICINE

## 2023-04-13 PROCEDURE — 80048 BASIC METABOLIC PNL TOTAL CA: CPT | Performed by: EMERGENCY MEDICINE

## 2023-04-13 PROCEDURE — 96375 TX/PRO/DX INJ NEW DRUG ADDON: CPT

## 2023-04-13 PROCEDURE — 73630 X-RAY EXAM OF FOOT: CPT

## 2023-04-13 PROCEDURE — 93971 EXTREMITY STUDY: CPT

## 2023-04-13 PROCEDURE — 25010000002 ONDANSETRON PER 1 MG: Performed by: EMERGENCY MEDICINE

## 2023-04-13 PROCEDURE — 85025 COMPLETE CBC W/AUTO DIFF WBC: CPT | Performed by: EMERGENCY MEDICINE

## 2023-04-13 PROCEDURE — 36415 COLL VENOUS BLD VENIPUNCTURE: CPT

## 2023-04-13 PROCEDURE — 99283 EMERGENCY DEPT VISIT LOW MDM: CPT

## 2023-04-13 PROCEDURE — 84145 PROCALCITONIN (PCT): CPT | Performed by: EMERGENCY MEDICINE

## 2023-04-13 PROCEDURE — 86140 C-REACTIVE PROTEIN: CPT | Performed by: EMERGENCY MEDICINE

## 2023-04-13 PROCEDURE — 0 HYDROMORPHONE 1 MG/ML SOLUTION: Performed by: EMERGENCY MEDICINE

## 2023-04-13 RX ORDER — ONDANSETRON 2 MG/ML
4 INJECTION INTRAMUSCULAR; INTRAVENOUS ONCE
Status: COMPLETED | OUTPATIENT
Start: 2023-04-13 | End: 2023-04-13

## 2023-04-13 RX ORDER — PREDNISONE 20 MG/1
20 TABLET ORAL 2 TIMES DAILY
Qty: 6 TABLET | Refills: 0 | Status: ON HOLD | OUTPATIENT
Start: 2023-04-13 | End: 2023-04-18

## 2023-04-13 RX ORDER — OXYCODONE AND ACETAMINOPHEN 7.5; 325 MG/1; MG/1
1 TABLET ORAL EVERY 6 HOURS PRN
Qty: 6 TABLET | Refills: 0 | Status: SHIPPED | OUTPATIENT
Start: 2023-04-13 | End: 2023-04-20 | Stop reason: HOSPADM

## 2023-04-13 RX ADMIN — HYDROMORPHONE HYDROCHLORIDE 1 MG: 1 INJECTION, SOLUTION INTRAMUSCULAR; INTRAVENOUS; SUBCUTANEOUS at 13:45

## 2023-04-13 RX ADMIN — ONDANSETRON 4 MG: 2 INJECTION INTRAMUSCULAR; INTRAVENOUS at 13:45

## 2023-04-13 NOTE — Clinical Note
[facility] EMERGENCY DEPARTMENT  [facility] [facility] [address]  Phone: [phone]    Santiago Mcgraw was seen and treated in our emergency department on 4/13/2023.  He may return to work on 04/10/2023.         Thank you for choosing [facility].    Henry Elam MD

## 2023-04-13 NOTE — Clinical Note
[facility] EMERGENCY DEPARTMENT  [facility] [facility] [address]  Phone: [phone]    Santiago Mcgraw was seen and treated in our emergency department on 4/13/2023.  He may return to work on 04/17/2023.         Thank you for choosing [facility].    Henry Elam MD

## 2023-04-13 NOTE — ED PROVIDER NOTES
Subjective   History of Present Illness  Patient with a history of gastric bypass came in with left foot pain.  The left foot is red distally at the metatarsal joint of the first toe.  Neuro vas examination negative calf examination unremarkable.    Illness  Location:  Left foot pain  Severity:  Moderate  Onset quality:  Gradual  Timing:  Constant  Chronicity:  New  Associated symptoms: no abdominal pain, no chest pain, no congestion, no cough, no fatigue, no fever, no headaches, no loss of consciousness, no nausea, no rhinorrhea, no shortness of breath, no sore throat and no vomiting        Review of Systems   Constitutional: Negative.  Negative for chills, fatigue and fever.   HENT: Negative.  Negative for congestion, rhinorrhea and sore throat.    Respiratory: Negative.  Negative for cough, chest tightness, shortness of breath and stridor.    Cardiovascular: Negative.  Negative for chest pain.   Gastrointestinal: Negative.  Negative for abdominal distention, abdominal pain, nausea and vomiting.   Endocrine: Negative.    Genitourinary: Negative.  Negative for difficulty urinating and flank pain.   Musculoskeletal: Negative.    Skin: Negative.  Negative for color change.   Neurological: Negative.  Negative for dizziness, loss of consciousness and headaches.   All other systems reviewed and are negative.      Past Medical History:   Diagnosis Date   • Hypertension    • Obesity    • Obstructive sleep apnea treated with continuous positive airway pressure (CPAP)    • Pain     back and joint   • Personal history of COVID-19 05/2022   • Personal history of COVID-19 01/2023       Allergies   Allergen Reactions   • Adhesive Tape Swelling and Rash     Latex based tape   • Latex Swelling and Rash       Past Surgical History:   Procedure Laterality Date   • ENDOSCOPY N/A 01/06/2023    Procedure: ESOPHAGOGASTRODUODENOSCOPY WITH ANESTHESIA;  Surgeon: Solitario Luo MD;  Location: [facility] ENDOSCOPY;  Service: General;   Laterality: N/A;  pre morbid obesity  post  miriam rodrigues aprn   • GASTRIC SLEEVE LAPAROSCOPIC N/A 2/23/2023    Procedure: GASTRIC SLEEVE LAPAROSCOPIC WITH DAVINCI ROBOT;  Surgeon: Solitario Luo MD;  Location: [facility] OR;  Service: Robotics - DaVinci;  Laterality: N/A;   • HIP SURGERY Left 1986       Family History   Problem Relation Age of Onset   • Arthritis Mother    • Hypertension Mother    • Obesity Mother    • Arthritis Father    • Diabetes Father    • Hypertension Father    • Obesity Father    • Arthritis Brother    • Obesity Brother    • Arthritis Maternal Grandmother    • Hypertension Maternal Grandmother    • Cancer Maternal Grandfather    • Hypertension Maternal Grandfather    • Arthritis Paternal Grandmother    • Hypertension Paternal Grandmother    • Obesity Paternal Grandmother    • Hypertension Paternal Grandfather    • Stroke Paternal Grandfather    • Obesity Paternal Grandfather        Social History     Socioeconomic History   • Marital status:    Tobacco Use   • Smoking status: Never   • Smokeless tobacco: Never   Vaping Use   • Vaping Use: Never used   Substance and Sexual Activity   • Alcohol use: Never   • Drug use: Never   • Sexual activity: Defer           Objective   Physical Exam  Vitals and nursing note reviewed. Exam conducted with a chaperone present.   Constitutional:       General: He is awake.      Appearance: Normal appearance. He is well-developed. He is not ill-appearing.   HENT:      Head: Normocephalic and atraumatic.   Eyes:      General: Lids are normal.      Conjunctiva/sclera: Conjunctivae normal.      Pupils: Pupils are equal, round, and reactive to light.   Cardiovascular:      Rate and Rhythm: Normal rate and regular rhythm.      Chest Wall: PMI is not displaced.      Pulses: Normal pulses.      Heart sounds: Normal heart sounds.   Pulmonary:      Effort: Pulmonary effort is normal.      Breath sounds: Normal breath sounds. No decreased breath sounds.    Abdominal:      General: Abdomen is flat. Bowel sounds are normal.      Palpations: Abdomen is soft.      Tenderness: There is no abdominal tenderness.   Musculoskeletal:         General: Normal range of motion.      Cervical back: Full passive range of motion without pain, normal range of motion and neck supple.      Comments: Left foot erythema.  Dorsal pedis and posterior pulse are palpable.  Calf examination negative.   Skin:     General: Skin is warm and dry.      Capillary Refill: Capillary refill takes less than 2 seconds.   Neurological:      General: No focal deficit present.      Mental Status: He is alert and oriented to person, place, and time.      Motor: Motor function is intact.      Deep Tendon Reflexes: Reflexes are normal and symmetric.   Psychiatric:         Behavior: Behavior is cooperative.         Procedures           ED Course  ED Course as of 04/13/23 1452   Thu Apr 13, 2023   1448 Patient given foot pain has gout sonogram is negative Pro-Avery is negative. [TS]      ED Course User Index  [TS] Henry Elam MD                                   [facility] reviewed by Henry Elam MD       Medical Decision Making  Amount and/or Complexity of Data Reviewed  Labs: ordered.  Radiology: ordered.      Risk  Prescription drug management.          Final diagnoses:   Acute idiopathic gout of left foot   Morbid obesity       ED Disposition  ED Disposition     ED Disposition   Discharge    Condition   Stable    Comment   --             Angelo Mckinnon, APRN  [address] [facility] [address] [phone]               Medication List      New Prescriptions    oxyCODONE-acetaminophen 7.5-325 MG per tablet  Commonly known as: PERCOCET  Take 1 tablet by mouth Every 6 (Six) Hours As Needed for Moderate Pain.     predniSONE 20 MG tablet  Commonly known as: DELTASONE  Take 1 tablet by mouth 2 (Two) Times a Day for 3 days.           Where to Get Your Medications      These medications were sent to [facility] [place] - [address] - [phone]  - [phone]  [address], [place] [address]    Phone: [phone]   · oxyCODONE-acetaminophen 7.5-325 MG per tablet  · predniSONE 20 MG tablet          Henry Elam MD  04/13/23 1336       Henry Elam MD  04/13/23 1426       Henry Elam MD  04/13/23 1636

## 2023-04-14 ENCOUNTER — TELEPHONE (OUTPATIENT)
Dept: BARIATRICS/WEIGHT MGMT | Facility: CLINIC | Age: 48
End: 2023-04-14
Payer: COMMERCIAL

## 2023-04-14 DIAGNOSIS — E66.01 CLASS 3 SEVERE OBESITY DUE TO EXCESS CALORIES WITH SERIOUS COMORBIDITY AND BODY MASS INDEX (BMI) OF 45.0 TO 49.9 IN ADULT: Primary | ICD-10-CM

## 2023-04-14 NOTE — TELEPHONE ENCOUNTER
I contacted patient about question for medication that was prescribed for gout flareup.  I encouraged patient that he could take pain medication and was okay to continue the 3 days of steroids that were prescribed.  Patient states that he is still having some pain but he has improved.  He plans to follow-up with his PCP for preventative gout medication once this flareup has resolved.    Patient also complains of increased nausea and struggling with tolerating liquid intake.  He states that he throws up at least twice a day and it occurs with drinking and without drinking.  For example he states this morning he had a sip of water and 30 minutes later vomited.    I have explained patient's symptoms with Dr. Luo.  I am going to schedule an esophagram.  If negative we will order a gastric emptying study.  Patient has been encouraged to drink what he tolerates.  He has also been encouraged to continue protein shakes and dilute them as needed.

## 2023-04-17 ENCOUNTER — LAB (OUTPATIENT)
Dept: LAB | Facility: HOSPITAL | Age: 48
End: 2023-04-17
Payer: COMMERCIAL

## 2023-04-17 ENCOUNTER — APPOINTMENT (OUTPATIENT)
Dept: ULTRASOUND IMAGING | Facility: HOSPITAL | Age: 48
End: 2023-04-17
Payer: COMMERCIAL

## 2023-04-17 ENCOUNTER — TRANSCRIBE ORDERS (OUTPATIENT)
Dept: ADMINISTRATIVE | Facility: HOSPITAL | Age: 48
End: 2023-04-17
Payer: COMMERCIAL

## 2023-04-17 ENCOUNTER — HOSPITAL ENCOUNTER (OUTPATIENT)
Facility: HOSPITAL | Age: 48
Setting detail: OBSERVATION
Discharge: HOME | End: 2023-04-20
Attending: SURGERY | Admitting: SURGERY
Payer: COMMERCIAL

## 2023-04-17 ENCOUNTER — OFFICE VISIT (OUTPATIENT)
Dept: BARIATRICS/WEIGHT MGMT | Facility: CLINIC | Age: 48
End: 2023-04-17
Payer: COMMERCIAL

## 2023-04-17 ENCOUNTER — HOSPITAL ENCOUNTER (OUTPATIENT)
Dept: GENERAL RADIOLOGY | Facility: HOSPITAL | Age: 48
Discharge: HOME | End: 2023-04-17
Payer: COMMERCIAL

## 2023-04-17 VITALS
WEIGHT: 315 LBS | BODY MASS INDEX: 42.66 KG/M2 | OXYGEN SATURATION: 99 % | DIASTOLIC BLOOD PRESSURE: 84 MMHG | HEIGHT: 72 IN | SYSTOLIC BLOOD PRESSURE: 137 MMHG | TEMPERATURE: 97.3 F | HEART RATE: 68 BPM

## 2023-04-17 DIAGNOSIS — Z98.84 STATUS POST LAPAROSCOPIC SLEEVE GASTRECTOMY: Primary | ICD-10-CM

## 2023-04-17 DIAGNOSIS — R11.14 BILIOUS VOMITING WITH NAUSEA: ICD-10-CM

## 2023-04-17 DIAGNOSIS — I10 PRIMARY HYPERTENSION: ICD-10-CM

## 2023-04-17 DIAGNOSIS — K52.9 GASTROENTERITIS: Primary | ICD-10-CM

## 2023-04-17 DIAGNOSIS — E66.01 CLASS 3 SEVERE OBESITY DUE TO EXCESS CALORIES WITH SERIOUS COMORBIDITY AND BODY MASS INDEX (BMI) OF 40.0 TO 44.9 IN ADULT: ICD-10-CM

## 2023-04-17 DIAGNOSIS — Z98.84 STATUS POST LAPAROSCOPIC SLEEVE GASTRECTOMY: ICD-10-CM

## 2023-04-17 DIAGNOSIS — Z87.898 HISTORY OF HEARTBURN: ICD-10-CM

## 2023-04-17 DIAGNOSIS — E66.01 CLASS 3 SEVERE OBESITY DUE TO EXCESS CALORIES WITH SERIOUS COMORBIDITY AND BODY MASS INDEX (BMI) OF 40.0 TO 44.9 IN ADULT: Primary | ICD-10-CM

## 2023-04-17 DIAGNOSIS — K52.9 GASTROENTERITIS: ICD-10-CM

## 2023-04-17 PROBLEM — R11.10 VOMITING: Status: ACTIVE | Noted: 2023-04-17

## 2023-04-17 LAB — PHOSPHATE SERPL-MCNC: 2.9 MG/DL (ref 2.5–4.5)

## 2023-04-17 PROCEDURE — 96361 HYDRATE IV INFUSION ADD-ON: CPT

## 2023-04-17 PROCEDURE — 99283 EMERGENCY DEPT VISIT LOW MDM: CPT

## 2023-04-17 PROCEDURE — 99024 POSTOP FOLLOW-UP VISIT: CPT | Performed by: SURGERY

## 2023-04-17 PROCEDURE — G0378 HOSPITAL OBSERVATION PER HR: HCPCS

## 2023-04-17 PROCEDURE — 84100 ASSAY OF PHOSPHORUS: CPT | Performed by: SURGERY

## 2023-04-17 PROCEDURE — 74240 X-RAY XM UPR GI TRC 1CNTRST: CPT

## 2023-04-17 PROCEDURE — 0 DIATRIZOATE MEGLUMINE & SODIUM PER 1 ML: Performed by: SURGERY

## 2023-04-17 PROCEDURE — 96375 TX/PRO/DX INJ NEW DRUG ADDON: CPT

## 2023-04-17 PROCEDURE — 76705 ECHO EXAM OF ABDOMEN: CPT

## 2023-04-17 PROCEDURE — 25010000002 METOCLOPRAMIDE PER 10 MG: Performed by: SURGERY

## 2023-04-17 PROCEDURE — 25010000002 ONDANSETRON PER 1 MG: Performed by: SURGERY

## 2023-04-17 PROCEDURE — 96376 TX/PRO/DX INJ SAME DRUG ADON: CPT

## 2023-04-17 RX ORDER — PANTOPRAZOLE SODIUM 40 MG/10ML
40 INJECTION, POWDER, LYOPHILIZED, FOR SOLUTION INTRAVENOUS ONCE
Status: COMPLETED | OUTPATIENT
Start: 2023-04-17 | End: 2023-04-17

## 2023-04-17 RX ORDER — SODIUM CHLORIDE, SODIUM LACTATE, POTASSIUM CHLORIDE, CALCIUM CHLORIDE 600; 310; 30; 20 MG/100ML; MG/100ML; MG/100ML; MG/100ML
50 INJECTION, SOLUTION INTRAVENOUS CONTINUOUS
Status: DISCONTINUED | OUTPATIENT
Start: 2023-04-17 | End: 2023-04-20 | Stop reason: HOSPADM

## 2023-04-17 RX ORDER — SCOLOPAMINE TRANSDERMAL SYSTEM 1 MG/1
1 PATCH, EXTENDED RELEASE TRANSDERMAL
Status: DISCONTINUED | OUTPATIENT
Start: 2023-04-17 | End: 2023-04-20 | Stop reason: HOSPADM

## 2023-04-17 RX ORDER — METOCLOPRAMIDE HYDROCHLORIDE 5 MG/ML
5 INJECTION INTRAMUSCULAR; INTRAVENOUS EVERY 6 HOURS PRN
Status: DISCONTINUED | OUTPATIENT
Start: 2023-04-17 | End: 2023-04-19

## 2023-04-17 RX ORDER — METOCLOPRAMIDE HYDROCHLORIDE 5 MG/ML
10 INJECTION INTRAMUSCULAR; INTRAVENOUS EVERY 6 HOURS
Status: DISCONTINUED | OUTPATIENT
Start: 2023-04-17 | End: 2023-04-18

## 2023-04-17 RX ORDER — ONDANSETRON 2 MG/ML
4 INJECTION INTRAMUSCULAR; INTRAVENOUS EVERY 6 HOURS PRN
Status: DISCONTINUED | OUTPATIENT
Start: 2023-04-17 | End: 2023-04-17

## 2023-04-17 RX ORDER — ONDANSETRON 2 MG/ML
4 INJECTION INTRAMUSCULAR; INTRAVENOUS EVERY 6 HOURS SCHEDULED
Status: DISCONTINUED | OUTPATIENT
Start: 2023-04-17 | End: 2023-04-19

## 2023-04-17 RX ORDER — SODIUM CHLORIDE 9 MG/ML
40 INJECTION, SOLUTION INTRAVENOUS AS NEEDED
Status: DISCONTINUED | OUTPATIENT
Start: 2023-04-17 | End: 2023-04-18

## 2023-04-17 RX ORDER — ONDANSETRON 2 MG/ML
4 INJECTION INTRAMUSCULAR; INTRAVENOUS EVERY 6 HOURS SCHEDULED
Status: DISCONTINUED | OUTPATIENT
Start: 2023-04-17 | End: 2023-04-17

## 2023-04-17 RX ADMIN — DIATRIZOATE MEGLUMINE AND DIATRIZOATE SODIUM 60 ML: 660; 100 LIQUID ORAL; RECTAL at 10:05

## 2023-04-17 RX ADMIN — SCOPALAMINE 1 PATCH: 1 PATCH, EXTENDED RELEASE TRANSDERMAL at 13:52

## 2023-04-17 RX ADMIN — ONDANSETRON 4 MG: 2 INJECTION INTRAMUSCULAR; INTRAVENOUS at 20:31

## 2023-04-17 RX ADMIN — SODIUM CHLORIDE, POTASSIUM CHLORIDE, SODIUM LACTATE AND CALCIUM CHLORIDE 125 ML/HR: 600; 310; 30; 20 INJECTION, SOLUTION INTRAVENOUS at 13:52

## 2023-04-17 RX ADMIN — METOCLOPRAMIDE 5 MG: 5 INJECTION, SOLUTION INTRAMUSCULAR; INTRAVENOUS at 14:50

## 2023-04-17 RX ADMIN — PANTOPRAZOLE SODIUM 40 MG: 40 INJECTION, POWDER, FOR SOLUTION INTRAVENOUS at 16:12

## 2023-04-17 RX ADMIN — SODIUM CHLORIDE, POTASSIUM CHLORIDE, SODIUM LACTATE AND CALCIUM CHLORIDE 125 ML/HR: 600; 310; 30; 20 INJECTION, SOLUTION INTRAVENOUS at 21:36

## 2023-04-17 RX ADMIN — METOCLOPRAMIDE HYDROCHLORIDE 10 MG: 5 INJECTION INTRAMUSCULAR; INTRAVENOUS at 21:35

## 2023-04-17 RX ADMIN — ONDANSETRON 4 MG: 2 INJECTION INTRAMUSCULAR; INTRAVENOUS at 14:51

## 2023-04-17 NOTE — H&P (VIEW-ONLY)
"  Patient Care Team:  Angelo Mckinnon APRN as PCP - General (Family Medicine)  Francie Cabrera APRN as Nurse Practitioner (Nurse Practitioner)    Subjective     Santiago Mcgraw is a 47 y.o. male.     Santiago is post op approximately 2 months from his sleeve gastrectomy procedure.  He is currently on his clears/ice chips as tolerated diet.  He states that he barely is able to get protein shakes down.  He is able to tolerate ice chips and sips however is not tolerating solid foods.  He has attempted fruits with difficulty.  As soon as he injures his mouth he throws it up.  He has been vomiting of gastric contents as well.  He still feels fatigued and his progression of intake has not improved.  Changes include recent onset of gout however this has happened in tandem with his symptoms mentioned above.  He has been placed on steroids and is tolerating those pills however not tolerating the liquid diet.  He does not complain of nausea as much as the sensations of wanting to vomit after intake of liquids.    Review Of Systems:  General ROS: positive for  - fatigue and weight loss  Respiratory ROS: no cough, shortness of breath, or wheezing  Cardiovascular ROS: no chest pain or dyspnea on exertion  Gastrointestinal ROS: no abdominal pain, change in bowel habits, or black or bloody stools  positive for - constipation and nausea/vomiting    The following portions of the patient's history were reviewed and updated as appropriate: allergies, current medications, past family history, past medical history, past social history, past surgical history and problem list.    Objective   /86   Pulse 65   Temp 97.6 °F (36.4 °C) (Oral)   Resp 18   Ht 182.9 cm (72\")   Wt (!) 141 kg (311 lb)   SpO2 100%   BMI 42.18 kg/m²       04/17/23  1317   Weight: (!) 141 kg (311 lb)        General Appearance:  awake, alert, oriented, in no acute distress  Lungs:  Normal expansion.  Clear to auscultation.  No rales, rhonchi, or " wheezing.  Heart:  Heart regular rate and rhythm  Abdomen:  Soft, non-tender, normal bowel sounds; no bruits, organomegaly or masses.  Abnormal shape: obese  Wounds: clean, dry, intact    ASSESSMENT/ PLAN    Encounter Diagnoses   Name Primary?   • Gastroenteritis Yes   • History of heartburn    • Status post laparoscopic sleeve gastrectomy    • Primary hypertension    • Class 3 severe obesity due to excess calories with serious comorbidity and body mass index (BMI) of 40.0 to 44.9 in adult    • Bilious vomiting with nausea      Symptomatic GERD with dysphagia- my concern is the development of a hiatal hernia due to his acute weight loss.  This might be a mechanical cause of his symptoms of GERD.  I will obtain an upper GI and I have explained to the patient it is imperative that he do his best to keep the liquids down so the radiologist can ascertain the pathway of the fluids.  The goal is to rule out hiatal hernia as a mechanical cause or obstruction as a mechanical cause of his dysphagia.  Also the concern is his malnutrition.  I explained to the patient if the symptoms do not resolve we we will have to consider IV fluid replacement or enteral feeds through the small bowel.  Results will be obtained today and reviewed.  Pending these results we will determine our next course of action.  There is also the possibility of readmitting the patient for nutritional replacement.  Nutritional status will be obtained through laboratory work today as well.    Due to the patient's nausea, vomiting and symptoms of regurgitation for adequate nutrition and improvement of the symptoms resulted when he was admitted the last time under my care.  He will be readmitted.  I have checked his phosphorus levels which were within normal limits.  We will start him on clears as tolerated and progressively advance his diet to full liquids as tolerated.  I explained this course of action with his family as well as the patient.  He is in  agreement with being admitted.  His upper GI was inconclusive for unsuccessful due to poor oral intake of contrast.  The plan will be to control the nausea first and reattempt an additional study.  In the meantime we will get an x-ray of his abdomen in the a.m. to assess the contrast location.  When he is tolerant of the contrast and is also having improvement of his nausea to tolerate clears we will proceed with an additional study.  Of note the patient did vomit here in our office while we were getting him situated for a bed.  He will be admitted to the emergency department/observation status floor due to availability.  The patient is hemodynamically stable throughout the course however the nausea is impeding his oral intake.  No gross evidence of obstruction noted on the upper GI study.      04/17/23  13:29 CDT  Patient Care Team:  Angelo Mckinnon APRN as PCP - General (Family Medicine)  Francie Cabrera APRN as Nurse Practitioner (Nurse Practitioner)  Solitario Luo MD FACS

## 2023-04-17 NOTE — PROGRESS NOTES
"  Patient Care Team:  Angelo Mckinnon APRN as PCP - General (Family Medicine)  Francie Cabrera APRN as Nurse Practitioner (Nurse Practitioner)    Subjective     Santiago Mcgraw is a 47 y.o. male.     Santiago is post op approximately 2 months from his sleeve gastrectomy procedure.  He is currently on his clears/ice chips as tolerated diet.  He states that he barely is able to get protein shakes down.  He is able to tolerate ice chips and sips however is not tolerating solid foods.  He has attempted fruits with difficulty.  As soon as he injures his mouth he throws it up.  He has been vomiting of gastric contents as well.  He still feels fatigued and his progression of intake has not improved.  Changes include recent onset of gout however this has happened in tandem with his symptoms mentioned above.  He has been placed on steroids and is tolerating those pills however not tolerating the liquid diet.  He does not complain of nausea as much as the sensations of wanting to vomit after intake of liquids.    Review Of Systems:  General ROS: positive for  - fatigue and weight loss  Respiratory ROS: no cough, shortness of breath, or wheezing  Cardiovascular ROS: no chest pain or dyspnea on exertion  Gastrointestinal ROS: no abdominal pain, change in bowel habits, or black or bloody stools  positive for - constipation and nausea/vomiting    The following portions of the patient's history were reviewed and updated as appropriate: allergies, current medications, past family history, past medical history, past social history, past surgical history and problem list.    Objective   /84 (BP Location: Right arm, Patient Position: Sitting, Cuff Size: Adult)   Pulse 68   Temp 97.3 °F (36.3 °C)   Ht 182.9 cm (72\")   Wt (!) 148 kg (327 lb 3.2 oz)   SpO2 99%   BMI 44.38 kg/m²       04/17/23  0904   Weight: (!) 148 kg (327 lb 3.2 oz)        General Appearance:  awake, alert, oriented, in no acute distress  Lungs:  " Normal expansion.  Clear to auscultation.  No rales, rhonchi, or wheezing.  Heart:  Heart regular rate and rhythm  Abdomen:  Soft, non-tender, normal bowel sounds; no bruits, organomegaly or masses.  Abnormal shape: obese  Wounds: clean, dry, intact    ASSESSMENT/ PLAN    Encounter Diagnoses   Name Primary?   • Gastroenteritis Yes   • History of heartburn    • Status post laparoscopic sleeve gastrectomy    • Primary hypertension    • Class 3 severe obesity due to excess calories with serious comorbidity and body mass index (BMI) of 40.0 to 44.9 in adult    • Bilious vomiting with nausea      Symptomatic GERD with dysphagia- my concern is the development of a hiatal hernia due to his acute weight loss.  This might be a mechanical cause of his symptoms of GERD.  I will obtain an upper GI and I have explained to the patient it is imperative that he do his best to keep the liquids down so the radiologist can ascertain the pathway of the fluids.  The goal is to rule out hiatal hernia as a mechanical cause or obstruction as a mechanical cause of his dysphagia.  Also the concern is his malnutrition.  I explained to the patient if the symptoms do not resolve we we will have to consider IV fluid replacement or enteral feeds through the small bowel.  Results will be obtained today and reviewed.  Pending these results we will determine our next course of action.  There is also the possibility of readmitting the patient for nutritional replacement.  Nutritional status will be obtained through laboratory work today as well.      04/17/23  09:45 CDT  Patient Care Team:  Angelo Mckinnon APRN as PCP - General (Family Medicine)  Francie Cabrera APRN as Nurse Practitioner (Nurse Practitioner)  Solitario Luo MD FACS

## 2023-04-18 PROBLEM — R11.2 NAUSEA & VOMITING: Status: ACTIVE | Noted: 2023-04-18

## 2023-04-18 LAB
ALBUMIN SERPL-MCNC: 3.4 G/DL (ref 3.5–5.2)
ALBUMIN/GLOB SERPL: 1.5 G/DL
ALP SERPL-CCNC: 74 U/L (ref 39–117)
ALT SERPL W P-5'-P-CCNC: 24 U/L (ref 1–41)
ANION GAP SERPL CALCULATED.3IONS-SCNC: 12 MMOL/L (ref 5–15)
AST SERPL-CCNC: 16 U/L (ref 1–40)
BILIRUB SERPL-MCNC: 0.6 MG/DL (ref 0–1.2)
BUN SERPL-MCNC: 22 MG/DL (ref 6–20)
BUN/CREAT SERPL: 21 (ref 7–25)
CALCIUM SPEC-SCNC: 8.6 MG/DL (ref 8.6–10.5)
CHLORIDE SERPL-SCNC: 105 MMOL/L (ref 98–107)
CO2 SERPL-SCNC: 26 MMOL/L (ref 22–29)
CREAT SERPL-MCNC: 1.05 MG/DL (ref 0.76–1.27)
DEPRECATED RDW RBC AUTO: 46.1 FL (ref 37–54)
EGFRCR SERPLBLD CKD-EPI 2021: 87.6 ML/MIN/1.73
ERYTHROCYTE [DISTWIDTH] IN BLOOD BY AUTOMATED COUNT: 15.6 % (ref 12.3–15.4)
GLOBULIN UR ELPH-MCNC: 2.2 GM/DL
GLUCOSE SERPL-MCNC: 154 MG/DL (ref 65–99)
HCT VFR BLD AUTO: 37.1 % (ref 37.5–51)
HGB BLD-MCNC: 11.8 G/DL (ref 13–17.7)
MAGNESIUM SERPL-MCNC: 2 MG/DL (ref 1.6–2.6)
MCH RBC QN AUTO: 26.5 PG (ref 26.6–33)
MCHC RBC AUTO-ENTMCNC: 31.8 G/DL (ref 31.5–35.7)
MCV RBC AUTO: 83.4 FL (ref 79–97)
PHOSPHATE SERPL-MCNC: 3.1 MG/DL (ref 2.5–4.5)
PLATELET # BLD AUTO: 211 10*3/MM3 (ref 140–450)
PMV BLD AUTO: 11.3 FL (ref 6–12)
POTASSIUM SERPL-SCNC: 2.9 MMOL/L (ref 3.5–5.2)
PROT SERPL-MCNC: 5.6 G/DL (ref 6–8.5)
RBC # BLD AUTO: 4.45 10*6/MM3 (ref 4.14–5.8)
SODIUM SERPL-SCNC: 143 MMOL/L (ref 136–145)
WBC NRBC COR # BLD: 7.89 10*3/MM3 (ref 3.4–10.8)

## 2023-04-18 PROCEDURE — 36415 COLL VENOUS BLD VENIPUNCTURE: CPT | Performed by: SURGERY

## 2023-04-18 PROCEDURE — 25010000002 METOCLOPRAMIDE PER 10 MG: Performed by: SURGERY

## 2023-04-18 PROCEDURE — 80053 COMPREHEN METABOLIC PANEL: CPT | Performed by: SURGERY

## 2023-04-18 PROCEDURE — 96361 HYDRATE IV INFUSION ADD-ON: CPT

## 2023-04-18 PROCEDURE — 84100 ASSAY OF PHOSPHORUS: CPT

## 2023-04-18 PROCEDURE — 85027 COMPLETE CBC AUTOMATED: CPT | Performed by: SURGERY

## 2023-04-18 PROCEDURE — 96376 TX/PRO/DX INJ SAME DRUG ADON: CPT

## 2023-04-18 PROCEDURE — 84134 ASSAY OF PREALBUMIN: CPT | Performed by: SURGERY

## 2023-04-18 PROCEDURE — G0378 HOSPITAL OBSERVATION PER HR: HCPCS

## 2023-04-18 PROCEDURE — 25010000002 ONDANSETRON PER 1 MG: Performed by: SURGERY

## 2023-04-18 PROCEDURE — 0 POTASSIUM CHLORIDE PER 2 MEQ: Performed by: SURGERY

## 2023-04-18 PROCEDURE — 83735 ASSAY OF MAGNESIUM: CPT | Performed by: SURGERY

## 2023-04-18 RX ORDER — POTASSIUM CHLORIDE 20MEQ/15ML
40 LIQUID (ML) ORAL DAILY
Status: DISCONTINUED | OUTPATIENT
Start: 2023-04-18 | End: 2023-04-18

## 2023-04-18 RX ORDER — POTASSIUM CHLORIDE 29.8 MG/ML
20 INJECTION INTRAVENOUS ONCE
Status: COMPLETED | OUTPATIENT
Start: 2023-04-18 | End: 2023-04-18

## 2023-04-18 RX ORDER — POTASSIUM CHLORIDE 7.45 MG/ML
10 INJECTION INTRAVENOUS
Status: DISCONTINUED | OUTPATIENT
Start: 2023-04-18 | End: 2023-04-18

## 2023-04-18 RX ADMIN — ONDANSETRON 4 MG: 2 INJECTION INTRAMUSCULAR; INTRAVENOUS at 17:25

## 2023-04-18 RX ADMIN — POTASSIUM CHLORIDE 40 MEQ: 20 SOLUTION ORAL at 09:21

## 2023-04-18 RX ADMIN — METOCLOPRAMIDE HYDROCHLORIDE 10 MG: 5 INJECTION INTRAMUSCULAR; INTRAVENOUS at 20:33

## 2023-04-18 RX ADMIN — METOCLOPRAMIDE HYDROCHLORIDE 10 MG: 5 INJECTION INTRAMUSCULAR; INTRAVENOUS at 03:48

## 2023-04-18 RX ADMIN — ONDANSETRON 4 MG: 2 INJECTION INTRAMUSCULAR; INTRAVENOUS at 23:23

## 2023-04-18 RX ADMIN — METOCLOPRAMIDE HYDROCHLORIDE 10 MG: 5 INJECTION INTRAMUSCULAR; INTRAVENOUS at 14:41

## 2023-04-18 RX ADMIN — SODIUM CHLORIDE, POTASSIUM CHLORIDE, SODIUM LACTATE AND CALCIUM CHLORIDE 125 ML/HR: 600; 310; 30; 20 INJECTION, SOLUTION INTRAVENOUS at 20:33

## 2023-04-18 RX ADMIN — SODIUM CHLORIDE, POTASSIUM CHLORIDE, SODIUM LACTATE AND CALCIUM CHLORIDE 125 ML/HR: 600; 310; 30; 20 INJECTION, SOLUTION INTRAVENOUS at 05:44

## 2023-04-18 RX ADMIN — SODIUM CHLORIDE, POTASSIUM CHLORIDE, SODIUM LACTATE AND CALCIUM CHLORIDE 125 ML/HR: 600; 310; 30; 20 INJECTION, SOLUTION INTRAVENOUS at 12:56

## 2023-04-18 RX ADMIN — ONDANSETRON 4 MG: 2 INJECTION INTRAMUSCULAR; INTRAVENOUS at 05:44

## 2023-04-18 RX ADMIN — ONDANSETRON 4 MG: 2 INJECTION INTRAMUSCULAR; INTRAVENOUS at 11:32

## 2023-04-18 RX ADMIN — METOCLOPRAMIDE HYDROCHLORIDE 10 MG: 5 INJECTION INTRAMUSCULAR; INTRAVENOUS at 09:07

## 2023-04-18 RX ADMIN — POTASSIUM CHLORIDE 20 MEQ: 29.8 INJECTION, SOLUTION INTRAVENOUS at 11:32

## 2023-04-18 NOTE — NURSING NOTE
Report called to Halima SMITH.  Pt transported to [facility] by  [facility] with all belongings   Pt stated he would left his wife know of new room assignment.     Implemented All Fall Risk Interventions:  [place] to call system. Call bell, personal items and telephone within reach. Instruct patient to call for assistance. Room bathroom lighting operational. Non-slip footwear when patient is off stretcher. Physically safe environment: no spills, clutter or unnecessary equipment. Stretcher in lowest position, wheels locked, appropriate side rails in place. Provide visual cue, wrist band, yellow gown, etc. Monitor gait and stability. Monitor for mental status changes and reorient to person, place, and time. Review medications for side effects contributing to fall risk. Reinforce activity limits and safety measures with patient and family.

## 2023-04-18 NOTE — PROGRESS NOTES
"Patient Care Team:  Angelo Mckinnon APRN as PCP - General (Family Medicine)  Francie Cabrera APRN as Nurse Practitioner (Nurse Practitioner)    Chief Complaint: Nausea and vomiting possible GERD    Subjective     Santiago Mcgraw is POD several weeks ago from a sleeve gastrectomy.  He he had been admitted yesterday due to returning symptoms of nausea and vomiting and tolerating only sips and ice chips.  Due to the poor oral intake he was admitted.  Last night he was able to tolerate lemonade without vomiting.  He had an upper GI yesterday as well which showed moderate reflux and a small hiatal hernia no gross evidence of obstruction however reported as poor oral volume intake.  This a.m. he is feeling better and tolerating clears.  He is also undergone an ultrasound yesterday which disclosed gallbladder sludge no gross evidence of stones or signs of acute cholecystitis.  Possibly chronic cholecystitis reported.       Review of Systems:     Review of Systems - General ROS: positive for  - fatigue  Respiratory ROS: no cough, shortness of breath, or wheezing  Cardiovascular ROS: no chest pain or dyspnea on exertion  Gastrointestinal ROS: no abdominal pain, change in bowel habits, or black or bloody stools  positive for - nausea    Objective     Vital Signs  /72 (BP Location: Left arm)   Pulse 68   Temp 98.2 °F (36.8 °C) (Oral)   Resp 16   Ht 182.9 cm (72\")   Wt (!) 147 kg (324 lb 6.4 oz)   SpO2 98%   BMI 44.00 kg/m²     Physical Exam:    HEENT: extra ocular movement intact and sclera clear, anicteric  Respiratory: appears well, vitals normal, no respiratory distress, acyanotic, normal RR, chest clear, no wheezing, crepitations, rhonchi, normal symmetric air entry  Cardiovascular: Regular rate and rhythm, S1, S2 normal, no murmur, click, rub or gallop  GI: Soft, non-tender, normal bowel sounds; no bruits, organomegaly or masses.  Abnormal shape: obese  Musculoskeletal: inspection - no " abnormality  Neurologic: alert, oriented, normal speech, no focal findings or movement disorder noted     Results Review:    Labs reviewed    Medication Reviewed:   Current Facility-Administered Medications   Medication Dose Route Frequency Provider Last Rate Last Admin   • lactated ringers infusion  125 mL/hr Intravenous Continuous Solitario Luo  mL/hr at 04/18/23 0544 125 mL/hr at 04/18/23 0544   • metoclopramide (REGLAN) injection 10 mg  10 mg Intravenous Q6H Solitario Luo MD   10 mg at 04/18/23 0348   • metoclopramide (REGLAN) injection 5 mg  5 mg Intravenous Q6H PRN Solitario Luo MD   5 mg at 04/17/23 1450   • ondansetron (ZOFRAN) injection 4 mg  4 mg Intravenous Q6H Solitario Luo MD   4 mg at 04/18/23 0544   • potassium chloride (KAYCIEL) 20 mEq/15 mL solution 40 mEq  40 mEq Oral Daily Solitario Luo MD       • scopolamine patch 1 mg/72 hr  1 patch Transdermal Q72H Solitario Luo MD   1 patch at 04/17/23 1352       Assessment & Plan  POD several weeks ago from a lap sleeve gastrectomy  Patient will be advanced to full liquids and I explained to the patient that if indeed his symptoms worsen with protein shakes we will have to consider an alternative form of nutrition supplementation.  I also believe he can tolerate solids which we will progress accordingly to his hospital course.  The findings of the gallbladder sludge is concerning as an etiology of his nausea and vomiting symptoms however he does not present as an acute phase cholecystitis patient meaning no abdominal pain.  I will replace his potassium orally repeat his a.m. labs and repeat an upper GI in the a.m pending his tolerance of the advancement of his diet.  I explained that more than likely his gallbladder can be removed in a more elective stage IE outpatient if possible.          Solitario Luo MD  04/18/23  08:45 CDT

## 2023-04-18 NOTE — PLAN OF CARE
Goal Outcome Evaluation:      Pt advanced to full liquid diet. Very little intake. Potassium 2.9 today. Replaced with 1 run of Potassium IV. Monitoring labs. Possible repeat upper GI tomorrow. Continuing maintenance IV fluids. SCD for DVT prevention.

## 2023-04-18 NOTE — PLAN OF CARE
Goal Outcome Evaluation:  Plan of Care Reviewed With: patient        Progress: improving  Outcome Evaluation: patient admitted for vomiting. Hx of gastric sleeve approximately 2 months ago. States he vomits with oral intake. Denies n/v since admission to floor. Scheduled zofran and reglan given as ordered. Patient states he has improved with antiemetics. IVF infusing. Scd's in place. Up ad terrence. Safety maintained.

## 2023-04-19 ENCOUNTER — APPOINTMENT (OUTPATIENT)
Dept: GENERAL RADIOLOGY | Facility: HOSPITAL | Age: 48
End: 2023-04-19
Payer: COMMERCIAL

## 2023-04-19 LAB
ALBUMIN SERPL-MCNC: 3.6 G/DL (ref 3.5–5.2)
ALBUMIN/GLOB SERPL: 1.4 G/DL
ALP SERPL-CCNC: 81 U/L (ref 39–117)
ALT SERPL W P-5'-P-CCNC: 30 U/L (ref 1–41)
ANION GAP SERPL CALCULATED.3IONS-SCNC: 9 MMOL/L (ref 5–15)
AST SERPL-CCNC: 15 U/L (ref 1–40)
BILIRUB SERPL-MCNC: 0.8 MG/DL (ref 0–1.2)
BUN SERPL-MCNC: 15 MG/DL (ref 6–20)
BUN/CREAT SERPL: 14.3 (ref 7–25)
CALCIUM SPEC-SCNC: 8.9 MG/DL (ref 8.6–10.5)
CHLORIDE SERPL-SCNC: 104 MMOL/L (ref 98–107)
CO2 SERPL-SCNC: 28 MMOL/L (ref 22–29)
CREAT SERPL-MCNC: 1.05 MG/DL (ref 0.76–1.27)
DEPRECATED RDW RBC AUTO: 46.2 FL (ref 37–54)
EGFRCR SERPLBLD CKD-EPI 2021: 87.6 ML/MIN/1.73
ERYTHROCYTE [DISTWIDTH] IN BLOOD BY AUTOMATED COUNT: 15.6 % (ref 12.3–15.4)
GLOBULIN UR ELPH-MCNC: 2.6 GM/DL
GLUCOSE SERPL-MCNC: 123 MG/DL (ref 65–99)
HCT VFR BLD AUTO: 38.1 % (ref 37.5–51)
HGB BLD-MCNC: 12.1 G/DL (ref 13–17.7)
MCH RBC QN AUTO: 26.4 PG (ref 26.6–33)
MCHC RBC AUTO-ENTMCNC: 31.8 G/DL (ref 31.5–35.7)
MCV RBC AUTO: 83.2 FL (ref 79–97)
PLATELET # BLD AUTO: 226 10*3/MM3 (ref 140–450)
PMV BLD AUTO: 10.7 FL (ref 6–12)
POTASSIUM SERPL-SCNC: 3.3 MMOL/L (ref 3.5–5.2)
PREALB SERPL-MCNC: 18.2 MG/DL (ref 20–40)
PROT SERPL-MCNC: 6.2 G/DL (ref 6–8.5)
RBC # BLD AUTO: 4.58 10*6/MM3 (ref 4.14–5.8)
SODIUM SERPL-SCNC: 141 MMOL/L (ref 136–145)
WBC NRBC COR # BLD: 7.9 10*3/MM3 (ref 3.4–10.8)

## 2023-04-19 PROCEDURE — 25010000002 METOCLOPRAMIDE PER 10 MG: Performed by: SURGERY

## 2023-04-19 PROCEDURE — 96376 TX/PRO/DX INJ SAME DRUG ADON: CPT

## 2023-04-19 PROCEDURE — 85027 COMPLETE CBC AUTOMATED: CPT | Performed by: SURGERY

## 2023-04-19 PROCEDURE — 63710000001 ONDANSETRON ODT 4 MG TABLET DISPERSIBLE: Performed by: SURGERY

## 2023-04-19 PROCEDURE — 96361 HYDRATE IV INFUSION ADD-ON: CPT

## 2023-04-19 PROCEDURE — 0 POTASSIUM CHLORIDE PER 2 MEQ: Performed by: SURGERY

## 2023-04-19 PROCEDURE — G0378 HOSPITAL OBSERVATION PER HR: HCPCS

## 2023-04-19 PROCEDURE — 74240 X-RAY XM UPR GI TRC 1CNTRST: CPT

## 2023-04-19 PROCEDURE — 80053 COMPREHEN METABOLIC PANEL: CPT | Performed by: SURGERY

## 2023-04-19 PROCEDURE — 25010000002 ONDANSETRON PER 1 MG: Performed by: SURGERY

## 2023-04-19 PROCEDURE — 96375 TX/PRO/DX INJ NEW DRUG ADDON: CPT

## 2023-04-19 PROCEDURE — 25010000002 DEXAMETHASONE PER 1 MG: Performed by: SURGERY

## 2023-04-19 RX ORDER — PANTOPRAZOLE SODIUM 40 MG/1
40 TABLET, DELAYED RELEASE ORAL
Status: DISCONTINUED | OUTPATIENT
Start: 2023-04-20 | End: 2023-04-20 | Stop reason: HOSPADM

## 2023-04-19 RX ORDER — ONDANSETRON 4 MG/1
4 TABLET, ORALLY DISINTEGRATING ORAL 4 TIMES DAILY
Status: DISCONTINUED | OUTPATIENT
Start: 2023-04-19 | End: 2023-04-20 | Stop reason: HOSPADM

## 2023-04-19 RX ORDER — DEXAMETHASONE SODIUM PHOSPHATE 4 MG/ML
4 INJECTION, SOLUTION INTRA-ARTICULAR; INTRALESIONAL; INTRAMUSCULAR; INTRAVENOUS; SOFT TISSUE EVERY 8 HOURS PRN
Status: DISCONTINUED | OUTPATIENT
Start: 2023-04-19 | End: 2023-04-19

## 2023-04-19 RX ORDER — POTASSIUM CHLORIDE 29.8 MG/ML
20 INJECTION INTRAVENOUS ONCE
Status: COMPLETED | OUTPATIENT
Start: 2023-04-19 | End: 2023-04-19

## 2023-04-19 RX ORDER — DEXAMETHASONE SODIUM PHOSPHATE 4 MG/ML
4 INJECTION, SOLUTION INTRA-ARTICULAR; INTRALESIONAL; INTRAMUSCULAR; INTRAVENOUS; SOFT TISSUE ONCE
Status: COMPLETED | OUTPATIENT
Start: 2023-04-19 | End: 2023-04-19

## 2023-04-19 RX ORDER — METOCLOPRAMIDE HYDROCHLORIDE 5 MG/ML
5 INJECTION INTRAMUSCULAR; INTRAVENOUS EVERY 6 HOURS
Status: DISCONTINUED | OUTPATIENT
Start: 2023-04-19 | End: 2023-04-19

## 2023-04-19 RX ORDER — METOCLOPRAMIDE 5 MG/1
5 TABLET ORAL
Status: DISCONTINUED | OUTPATIENT
Start: 2023-04-19 | End: 2023-04-20 | Stop reason: HOSPADM

## 2023-04-19 RX ADMIN — METOCLOPRAMIDE HYDROCHLORIDE 5 MG: 5 INJECTION INTRAMUSCULAR; INTRAVENOUS at 14:13

## 2023-04-19 RX ADMIN — SODIUM CHLORIDE, POTASSIUM CHLORIDE, SODIUM LACTATE AND CALCIUM CHLORIDE 125 ML/HR: 600; 310; 30; 20 INJECTION, SOLUTION INTRAVENOUS at 12:03

## 2023-04-19 RX ADMIN — SODIUM CHLORIDE, POTASSIUM CHLORIDE, SODIUM LACTATE AND CALCIUM CHLORIDE 125 ML/HR: 600; 310; 30; 20 INJECTION, SOLUTION INTRAVENOUS at 05:35

## 2023-04-19 RX ADMIN — ONDANSETRON 4 MG: 4 TABLET, ORALLY DISINTEGRATING ORAL at 20:57

## 2023-04-19 RX ADMIN — ONDANSETRON 4 MG: 2 INJECTION INTRAMUSCULAR; INTRAVENOUS at 11:09

## 2023-04-19 RX ADMIN — BARIUM SULFATE 40 ML: 960 POWDER, FOR SUSPENSION ORAL at 09:33

## 2023-04-19 RX ADMIN — METOCLOPRAMIDE HYDROCHLORIDE 5 MG: 5 INJECTION INTRAMUSCULAR; INTRAVENOUS at 07:59

## 2023-04-19 RX ADMIN — ONDANSETRON 4 MG: 2 INJECTION INTRAMUSCULAR; INTRAVENOUS at 17:12

## 2023-04-19 RX ADMIN — DEXAMETHASONE SODIUM PHOSPHATE 4 MG: 4 INJECTION, SOLUTION INTRA-ARTICULAR; INTRALESIONAL; INTRAMUSCULAR; INTRAVENOUS; SOFT TISSUE at 07:59

## 2023-04-19 RX ADMIN — METOCLOPRAMIDE 5 MG: 5 TABLET ORAL at 18:29

## 2023-04-19 RX ADMIN — POTASSIUM CHLORIDE 20 MEQ: 29.8 INJECTION, SOLUTION INTRAVENOUS at 14:13

## 2023-04-19 RX ADMIN — SODIUM CHLORIDE, POTASSIUM CHLORIDE, SODIUM LACTATE AND CALCIUM CHLORIDE 50 ML/HR: 600; 310; 30; 20 INJECTION, SOLUTION INTRAVENOUS at 20:57

## 2023-04-19 RX ADMIN — ONDANSETRON 4 MG: 2 INJECTION INTRAMUSCULAR; INTRAVENOUS at 05:34

## 2023-04-19 NOTE — PROGRESS NOTES
"Patient Care Team:  Angelo Mckinnon APRN as PCP - General (Family Medicine)  Francie Cabrera APRN as Nurse Practitioner (Nurse Practitioner)    Chief Complaint: Status post sleeve gastrectomy with postoperative nausea and vomiting, and symptoms of reflux at times with recent findings of cholelithiasis/sludge without acute cholecystitis    Subjective     Santiago Mcgraw is POD several weeks ago from a sleeve gastrectomy procedure.  He had intermittent nausea and vomiting and reflux approximately 1 month postoperatively.  He was admitted this time due to better control of his nausea and vomiting with further work-up disclosing sludge in his gallbladder.  He had an incomplete upper GI with Gastrografin a few days prior and with better control of his nausea he had one today which showed no evidence of obstruction or narrowing of his sleeve gastrectomy.  Did note slow motility or emptying however this is consistent with the sleeve postoperatively.  Of note no gross evidence of moderate reflux as well.  He has been tolerating his clears and protein shakes yesterday.  He tolerated the upper GI as well this AM.  I have advance his diet to stage III and will continue to monitor him.  He also was noted to have hypokalemia which is being corrected with IV potassium.       Review of Systems:     Review of Systems - General ROS: positive for  - fatigue  Respiratory ROS: no cough, shortness of breath, or wheezing  Cardiovascular ROS: no chest pain or dyspnea on exertion  Gastrointestinal ROS: no abdominal pain, change in bowel habits, or black or bloody stools  positive for - nausea but has improved    Objective     Vital Signs  /86 (BP Location: Left arm, Patient Position: Sitting)   Pulse 52   Temp 98 °F (36.7 °C) (Oral)   Resp 16   Ht 182.9 cm (72\")   Wt (!) 147 kg (324 lb 6.4 oz)   SpO2 100%   BMI 44.00 kg/m²     Physical Exam:    HEENT: extra ocular movement intact and sclera clear, " anicteric  Respiratory: appears well, vitals normal, no respiratory distress, acyanotic, normal RR, chest clear, no wheezing, crepitations, rhonchi, normal symmetric air entry  Cardiovascular: Regular rate and rhythm, S1, S2 normal, no murmur, click, rub or gallop  GI: Soft, non-tender, normal bowel sounds; no bruits, organomegaly or masses.  Abnormal shape: obese  Neurologic: alert, oriented, normal speech, no focal findings or movement disorder noted     Results Review:    Labs reviewed    Medication Reviewed:   Current Facility-Administered Medications   Medication Dose Route Frequency Provider Last Rate Last Admin   • lactated ringers infusion  50 mL/hr Intravenous Continuous Solitario Luo  mL/hr at 04/19/23 1203 125 mL/hr at 04/19/23 1203   • metoclopramide (REGLAN) injection 5 mg  5 mg Intravenous Q6H Solitario Luo MD   5 mg at 04/19/23 1413   • ondansetron (ZOFRAN) injection 4 mg  4 mg Intravenous Q6H Solitario Luo MD   4 mg at 04/19/23 1109   • potassium chloride 20 mEq in 50 mL IVPB  20 mEq Intravenous Once Solitario Luo MD 50 mL/hr at 04/19/23 1413 20 mEq at 04/19/23 1413   • scopolamine patch 1 mg/72 hr  1 patch Transdermal Q72H Solitario Luo MD   1 patch at 04/17/23 1352       Assessment & Plan  POD several weeks ago from a sleeve gastrectomy with findings of micro cholelithiasis without acute cholecystitis.  Plan will be to monitor his advancement of his diet progression.  Upper GI showed no evidence of obstruction or significant GERD however slow transit.  He stated he has thus far tolerated parents that were cooked soft without throwing up.  He had 1 episode of a dry heaves but did not throw up the food bolus.  I encouraged him today to focus on his protein shakes, his food tolerance with fruits and vegetables and he will possibly be discharged home in the a.m.  He denies any abdominal pain associated with eating which might be a concern due to the micro cholelithiasis  findings on ultrasound.  I explained that we can more than likely electively remove his gallbladder as an outpatient procedure.    Hypokalemia this was replaced with potassium we will recheck his a.m. labs nutrition status.  He stated the food is burned his IV.  He does not like the taste of the oral potassium.  I have increased the fluids temporarily to help dilute the potassium IV dosage and upon completion of this is IV rate will be placed back to 50 cc an hour.    Solitario Luo MD  04/19/23  14:23 CDT

## 2023-04-19 NOTE — PROGRESS NOTES
"Discussed this evening with the patient wife the plan.  She was not present earlier today with the explanation I provided her  and she wanted to know the plan.  The wife's concerns were \"what will we do if the nausea returns, and he has a gallbladder problem\".  I explained to the patient's wife that he appears to be improving.  He is tolerating p.o. intake though small amounts it is significantly more than what  was prior to his admission.  His nausea has mildly improved but he is tolerating liquids and solids stage three. The plan will be to advance,  steadily. The plan was to re-educate the patient on small meals. Reviewed the upper G.I. port with his wife as well. If you know, evidence of obstruction, reflux, or stricture. It provided me with confidence that this is less likely related to the Sleeve, however he has gone through a signifi cant setback with his viral infection that happened after his surgery. I replace his potassium and explained that his low potassium was directly resulted from his nausea and vomiting that happened prior to submission. We will be watching his progression. If upon discharge, he symptoms return, I explained that we might have to consider strongly removing the gallbladder as it may be an ideology. However, I also explain to his wife that, d ue to his poor oral intake prior to his admission, my concern of modest mouse, nutrition, and his riley with the virus added upon his recent surgical procedure under general anesthesia for his sleeve gastrectomy it would be safer if we can monitor him and approach this conservatively and not take them back to the OR due to those risks of most likely cardiac/heart.  I explained this to the patient as well previously.  We are moving cons ervatively but safely because he is showing steady signs of improvement overall.  My expectations are for him to be able to be discharged home as I explained to his wife tomorrow on Reglan which is a new " medication and an antiacid medication.  He will be reassessed in the a.m. and we will reconvene tomorrow with his wife and him present prior to discharge.  The wife was agreeable with our plan and had a better understanding per statemen t of our course and plan.

## 2023-04-19 NOTE — PLAN OF CARE
Goal Outcome Evaluation:  Plan of Care Reviewed With: patient        Progress: improving  Outcome Evaluation: Patient tolerated a chocolate boost this shift. NPO at MN for UGI today. Denies pain/nausea. Reglan and Zofran given as scheduled. IVF infusing. SCD's. Up ad terrence. Safety maintained.

## 2023-04-19 NOTE — PLAN OF CARE
Goal Outcome Evaluation:      Upper GI series completed this morning. Advanced diet to bariatric stage 3. Potassium 3.3. 1 run of Potassium given this afternoon. Monitoring labs. NO BM today. Passing flatus. Will continue to monitor pt.

## 2023-04-20 VITALS
HEART RATE: 51 BPM | BODY MASS INDEX: 42.66 KG/M2 | WEIGHT: 315 LBS | RESPIRATION RATE: 16 BRPM | TEMPERATURE: 98.2 F | DIASTOLIC BLOOD PRESSURE: 76 MMHG | SYSTOLIC BLOOD PRESSURE: 125 MMHG | OXYGEN SATURATION: 100 % | HEIGHT: 72 IN

## 2023-04-20 LAB
25(OH)D3 SERPL-MCNC: 7.3 NG/ML (ref 30–100)
ALBUMIN SERPL-MCNC: 3.3 G/DL (ref 3.5–5.2)
ALBUMIN/GLOB SERPL: 1.4 G/DL
ALP SERPL-CCNC: 76 U/L (ref 39–117)
ALT SERPL W P-5'-P-CCNC: 32 U/L (ref 1–41)
ANION GAP SERPL CALCULATED.3IONS-SCNC: 11 MMOL/L (ref 5–15)
AST SERPL-CCNC: 19 U/L (ref 1–40)
BASOPHILS # BLD AUTO: 0.03 10*3/MM3 (ref 0–0.2)
BASOPHILS NFR BLD AUTO: 0.5 % (ref 0–1.5)
BILIRUB SERPL-MCNC: 0.8 MG/DL (ref 0–1.2)
BUN SERPL-MCNC: 13 MG/DL (ref 6–20)
BUN/CREAT SERPL: 12.3 (ref 7–25)
CALCIUM SPEC-SCNC: 8.6 MG/DL (ref 8.6–10.5)
CHLORIDE SERPL-SCNC: 102 MMOL/L (ref 98–107)
CO2 SERPL-SCNC: 27 MMOL/L (ref 22–29)
CREAT SERPL-MCNC: 1.06 MG/DL (ref 0.76–1.27)
DEPRECATED RDW RBC AUTO: 45.9 FL (ref 37–54)
EGFRCR SERPLBLD CKD-EPI 2021: 86.6 ML/MIN/1.73
EOSINOPHIL # BLD AUTO: 0.15 10*3/MM3 (ref 0–0.4)
EOSINOPHIL NFR BLD AUTO: 2.3 % (ref 0.3–6.2)
ERYTHROCYTE [DISTWIDTH] IN BLOOD BY AUTOMATED COUNT: 15.5 % (ref 12.3–15.4)
GLOBULIN UR ELPH-MCNC: 2.3 GM/DL
GLUCOSE SERPL-MCNC: 116 MG/DL (ref 65–99)
HCT VFR BLD AUTO: 37.9 % (ref 37.5–51)
HGB BLD-MCNC: 12.1 G/DL (ref 13–17.7)
IMM GRANULOCYTES # BLD AUTO: 0.06 10*3/MM3 (ref 0–0.05)
IMM GRANULOCYTES NFR BLD AUTO: 0.9 % (ref 0–0.5)
LYMPHOCYTES # BLD AUTO: 1.41 10*3/MM3 (ref 0.7–3.1)
LYMPHOCYTES NFR BLD AUTO: 21.2 % (ref 19.6–45.3)
MCH RBC QN AUTO: 26.8 PG (ref 26.6–33)
MCHC RBC AUTO-ENTMCNC: 31.9 G/DL (ref 31.5–35.7)
MCV RBC AUTO: 83.8 FL (ref 79–97)
MONOCYTES # BLD AUTO: 0.7 10*3/MM3 (ref 0.1–0.9)
MONOCYTES NFR BLD AUTO: 10.5 % (ref 5–12)
NEUTROPHILS NFR BLD AUTO: 4.29 10*3/MM3 (ref 1.7–7)
NEUTROPHILS NFR BLD AUTO: 64.6 % (ref 42.7–76)
NRBC BLD AUTO-RTO: 0 /100 WBC (ref 0–0.2)
PLATELET # BLD AUTO: 184 10*3/MM3 (ref 140–450)
PMV BLD AUTO: 11.1 FL (ref 6–12)
POTASSIUM SERPL-SCNC: 3 MMOL/L (ref 3.5–5.2)
POTASSIUM SERPL-SCNC: 3.4 MMOL/L (ref 3.5–5.2)
PREALB SERPL-MCNC: 16.5 MG/DL (ref 20–40)
PROT SERPL-MCNC: 5.6 G/DL (ref 6–8.5)
RBC # BLD AUTO: 4.52 10*6/MM3 (ref 4.14–5.8)
SODIUM SERPL-SCNC: 140 MMOL/L (ref 136–145)
VIT B12 BLD-MCNC: 248 PG/ML (ref 211–946)
WBC NRBC COR # BLD: 6.64 10*3/MM3 (ref 3.4–10.8)

## 2023-04-20 PROCEDURE — 84132 ASSAY OF SERUM POTASSIUM: CPT | Performed by: SURGERY

## 2023-04-20 PROCEDURE — 96366 THER/PROPH/DIAG IV INF ADDON: CPT

## 2023-04-20 PROCEDURE — 82607 VITAMIN B-12: CPT | Performed by: SURGERY

## 2023-04-20 PROCEDURE — 80053 COMPREHEN METABOLIC PANEL: CPT | Performed by: SURGERY

## 2023-04-20 PROCEDURE — 85025 COMPLETE CBC W/AUTO DIFF WBC: CPT | Performed by: SURGERY

## 2023-04-20 PROCEDURE — 96365 THER/PROPH/DIAG IV INF INIT: CPT

## 2023-04-20 PROCEDURE — 84425 ASSAY OF VITAMIN B-1: CPT | Performed by: SURGERY

## 2023-04-20 PROCEDURE — 82306 VITAMIN D 25 HYDROXY: CPT | Performed by: SURGERY

## 2023-04-20 PROCEDURE — 63710000001 ONDANSETRON ODT 4 MG TABLET DISPERSIBLE: Performed by: SURGERY

## 2023-04-20 PROCEDURE — 84134 ASSAY OF PREALBUMIN: CPT | Performed by: SURGERY

## 2023-04-20 PROCEDURE — G0378 HOSPITAL OBSERVATION PER HR: HCPCS

## 2023-04-20 RX ORDER — METOCLOPRAMIDE 5 MG/1
5 TABLET ORAL 2 TIMES DAILY
Qty: 14 TABLET | Refills: 0 | Status: SHIPPED | OUTPATIENT
Start: 2023-04-20 | End: 2023-04-25

## 2023-04-20 RX ADMIN — METOCLOPRAMIDE 5 MG: 5 TABLET ORAL at 12:11

## 2023-04-20 RX ADMIN — METOCLOPRAMIDE 5 MG: 5 TABLET ORAL at 08:01

## 2023-04-20 RX ADMIN — PANTOPRAZOLE SODIUM 40 MG: 40 TABLET, DELAYED RELEASE ORAL at 06:16

## 2023-04-20 RX ADMIN — ONDANSETRON 4 MG: 4 TABLET, ORALLY DISINTEGRATING ORAL at 08:01

## 2023-04-20 RX ADMIN — SCOPALAMINE 1 PATCH: 1 PATCH, EXTENDED RELEASE TRANSDERMAL at 15:15

## 2023-04-20 RX ADMIN — POTASSIUM PHOSPHATE, MONOBASIC POTASSIUM PHOSPHATE, DIBASIC 21 MMOL: 224; 236 INJECTION, SOLUTION, CONCENTRATE INTRAVENOUS at 09:18

## 2023-04-20 RX ADMIN — ONDANSETRON 4 MG: 4 TABLET, ORALLY DISINTEGRATING ORAL at 12:11

## 2023-04-20 NOTE — DISCHARGE SUMMARY
Date of Discharge:  4/20/2023    Discharge Diagnosis: Vomiting [R11.10]  Nausea & vomiting [R11.2]    Presenting Problem/History of Present Illness  Vomiting [R11.10]  Nausea & vomiting [R11.2]       Hospital Course  Patient is a 47 y.o. male presented with postoperative nausea and vomiting.  Patient was admitted due to intolerance of liquids and/or solids.  He was started on IV fluids and admitted.  His hospital course remained stable however the chief complaint was primarily nausea with foods.  He was started on antiemetics and had some improvement.  An ultrasound was performed which showed cholelithiasis without cholecystitis.  Upper GI was performed with the initial study being insufficient for evaluation.  He had a repeat upper GI 2 a few days later after I had a discussion with radiology to specifically use barium and note that the patient is a poor sleep patient that cannot consume large volumes of food.  The second upper GI was more efficient and adequately performed to radiology satisfaction.  His routine labs noted hypokalemia which was corrected through his hospital course.  At that point the patient's nausea had continued to improve.  I advance his diet and decrease his IV fluids.  I started him on oral Reglan and Zofran.  The patient then tolerated stage III diet appropriately for a status post sleeve gastrectomy.  He was discharged home in a stable condition after postoperative instructions, restrictions, we education on the appropriate progression of his meals and intake of his meals.  He was also notified to contact us with any concerns or issues with nausea if it repeats itself.  I explained that when his nutrition status improves we will consider removing his gallbladder in an elective fashion.  As long as he continues to tolerate the advancement of his diet increase his protein intake according to our dietitian' s recommendations.  He will follow-up with us as scheduled or as  "needed.    Procedures Performed         Consults:   Consults       No orders found from 3/19/2023 to 4/18/2023.              Condition on Discharge:  Stable    Vital Signs  /76 (BP Location: Left arm, Patient Position: Sitting)   Pulse 51   Temp 98.2 °F (36.8 °C) (Oral)   Resp 16   Ht 182.9 cm (72\")   Wt (!) 147 kg (324 lb 6.4 oz)   SpO2 100%   BMI 44.00 kg/m²     Physical Exam:  General Appearance alert, appears stated age, and cooperative  Lungs clear to auscultation, respirations regular, respirations even, and respirations unlabored  Heart regular rhythm & normal rate, normal S1, S2, no murmur, no gallop, no rub, and no click  Abdomen normal bowel sounds, no masses, no hepatomegaly, no splenomegaly, soft non-tender, no guarding, and no rebound tenderness    Discharge Disposition  Home or Self Care    Discharge Medications     Discharge Medications        New Medications        Instructions Start Date   metoclopramide 5 MG tablet  Commonly known as: Reglan   5 mg, Oral, 2 Times Daily             Continue These Medications        Instructions Start Date   buPROPion  MG 24 hr tablet  Commonly known as: WELLBUTRIN XL   150 mg, Oral, Every Morning      NON FORMULARY   1 each, Transdermal, Daily, Calcium patch (PatchAid)      NON FORMULARY   1 each, Transdermal, Daily, Vitamin B-12 patch (PatchAid)      NON FORMULARY   1 each, Transdermal, Daily, Multivitamin Patch (PatchAid)      ondansetron ODT 4 MG disintegrating tablet  Commonly known as: ZOFRAN-ODT   4 mg, Translingual, Every 8 Hours PRN      pantoprazole 40 MG EC tablet  Commonly known as: PROTONIX   40 mg, Oral, Daily      promethazine 12.5 MG tablet  Commonly known as: PHENERGAN   12.5 mg, Oral, Every 8 Hours PRN             Stop These Medications      oxyCODONE-acetaminophen 7.5-325 MG per tablet  Commonly known as: PERCOCET              Discharge Diet:     Activity at Discharge:     Follow-up Appointments  Future Appointments   Date Time " Provider Department Center   5/23/2023  9:30 AM Solitario Luo MD [facility] None   8/22/2023  9:00 AM Francie Cabrera APRN [facility] None   2/26/2024  9:30 AM Solitario Luo MD [facility] None         Test Results Pending at Discharge  Pending Labs       Order Current Status    Potassium Collected (04/20/23 1535)    Vitamin B1, Whole Blood In process             Solitario Luo MD  04/20/23  15:59 CDT

## 2023-04-20 NOTE — PLAN OF CARE
Goal Outcome Evaluation:  Plan of Care Reviewed With: patient        Progress: improving  Outcome Evaluation: Patient denies pain, voiding, up ad terrence, intermittent nausea, SCD's, zofran as ordered.

## 2023-04-21 ENCOUNTER — TELEPHONE (OUTPATIENT)
Dept: BARIATRICS/WEIGHT MGMT | Facility: CLINIC | Age: 48
End: 2023-04-21
Payer: COMMERCIAL

## 2023-04-21 NOTE — TELEPHONE ENCOUNTER
S/P 02/23/2023        D/C call. Patient is doing ok. He just finished a protein drink and is having some lemonade now. No vomiting just a little nausea that comes and goes.

## 2023-04-25 ENCOUNTER — TELEPHONE (OUTPATIENT)
Dept: BARIATRICS/WEIGHT MGMT | Facility: CLINIC | Age: 48
End: 2023-04-25
Payer: COMMERCIAL

## 2023-04-25 ENCOUNTER — LAB (OUTPATIENT)
Dept: LAB | Facility: HOSPITAL | Age: 48
End: 2023-04-25
Payer: COMMERCIAL

## 2023-04-25 ENCOUNTER — TELEPHONE (OUTPATIENT)
Dept: BARIATRICS/WEIGHT MGMT | Facility: CLINIC | Age: 48
End: 2023-04-25

## 2023-04-25 ENCOUNTER — HOSPITAL ENCOUNTER (OUTPATIENT)
Dept: ULTRASOUND IMAGING | Facility: HOSPITAL | Age: 48
Discharge: HOME | End: 2023-04-25
Payer: COMMERCIAL

## 2023-04-25 ENCOUNTER — OFFICE VISIT (OUTPATIENT)
Dept: BARIATRICS/WEIGHT MGMT | Facility: CLINIC | Age: 48
End: 2023-04-25
Payer: COMMERCIAL

## 2023-04-25 ENCOUNTER — HOSPITAL ENCOUNTER (OUTPATIENT)
Facility: HOSPITAL | Age: 48
Setting detail: OBSERVATION
Discharge: HOME | End: 2023-05-01
Attending: SURGERY | Admitting: SURGERY
Payer: COMMERCIAL

## 2023-04-25 VITALS
SYSTOLIC BLOOD PRESSURE: 129 MMHG | BODY MASS INDEX: 42.66 KG/M2 | DIASTOLIC BLOOD PRESSURE: 87 MMHG | WEIGHT: 315 LBS | HEART RATE: 94 BPM | TEMPERATURE: 98 F | OXYGEN SATURATION: 99 % | HEIGHT: 72 IN

## 2023-04-25 DIAGNOSIS — R11.2 NAUSEA & VOMITING: Primary | ICD-10-CM

## 2023-04-25 DIAGNOSIS — Z98.84 STATUS POST LAPAROSCOPIC SLEEVE GASTRECTOMY: Primary | ICD-10-CM

## 2023-04-25 DIAGNOSIS — E44.0 MODERATE PROTEIN-CALORIE MALNUTRITION: Primary | ICD-10-CM

## 2023-04-25 DIAGNOSIS — R11.14 BILIOUS VOMITING WITH NAUSEA: ICD-10-CM

## 2023-04-25 DIAGNOSIS — Z98.84 STATUS POST LAPAROSCOPIC SLEEVE GASTRECTOMY: ICD-10-CM

## 2023-04-25 DIAGNOSIS — R11.2 NAUSEA AND VOMITING, UNSPECIFIED VOMITING TYPE: ICD-10-CM

## 2023-04-25 DIAGNOSIS — M79.652 PAIN OF LEFT THIGH: Primary | ICD-10-CM

## 2023-04-25 DIAGNOSIS — E86.0 DEHYDRATION: ICD-10-CM

## 2023-04-25 DIAGNOSIS — K59.1 FUNCTIONAL DIARRHEA: ICD-10-CM

## 2023-04-25 DIAGNOSIS — E44.0 MODERATE PROTEIN-CALORIE MALNUTRITION: ICD-10-CM

## 2023-04-25 LAB
ALBUMIN SERPL-MCNC: 3.7 G/DL (ref 3.5–5.2)
ALBUMIN/GLOB SERPL: 1.2 G/DL
ALP SERPL-CCNC: 85 U/L (ref 39–117)
ALT SERPL W P-5'-P-CCNC: 19 U/L (ref 1–41)
ANION GAP SERPL CALCULATED.3IONS-SCNC: 11 MMOL/L (ref 5–15)
AST SERPL-CCNC: 14 U/L (ref 1–40)
BASOPHILS # BLD AUTO: 0.04 10*3/MM3 (ref 0–0.2)
BASOPHILS NFR BLD AUTO: 0.3 % (ref 0–1.5)
BILIRUB SERPL-MCNC: 0.9 MG/DL (ref 0–1.2)
BUN SERPL-MCNC: 11 MG/DL (ref 6–20)
BUN/CREAT SERPL: 9.8 (ref 7–25)
CALCIUM SPEC-SCNC: 9.2 MG/DL (ref 8.6–10.5)
CHLORIDE SERPL-SCNC: 98 MMOL/L (ref 98–107)
CO2 SERPL-SCNC: 28 MMOL/L (ref 22–29)
CREAT SERPL-MCNC: 1.12 MG/DL (ref 0.76–1.27)
DEPRECATED RDW RBC AUTO: 46.9 FL (ref 37–54)
EGFRCR SERPLBLD CKD-EPI 2021: 81 ML/MIN/1.73
EOSINOPHIL # BLD AUTO: 0.17 10*3/MM3 (ref 0–0.4)
EOSINOPHIL NFR BLD AUTO: 1.3 % (ref 0.3–6.2)
ERYTHROCYTE [DISTWIDTH] IN BLOOD BY AUTOMATED COUNT: 16 % (ref 12.3–15.4)
GLOBULIN UR ELPH-MCNC: 3.2 GM/DL
GLUCOSE SERPL-MCNC: 110 MG/DL (ref 65–99)
HCT VFR BLD AUTO: 38.8 % (ref 37.5–51)
HGB BLD-MCNC: 12.6 G/DL (ref 13–17.7)
IMM GRANULOCYTES # BLD AUTO: 0.07 10*3/MM3 (ref 0–0.05)
IMM GRANULOCYTES NFR BLD AUTO: 0.5 % (ref 0–0.5)
LYMPHOCYTES # BLD AUTO: 1.64 10*3/MM3 (ref 0.7–3.1)
LYMPHOCYTES NFR BLD AUTO: 12.3 % (ref 19.6–45.3)
MAGNESIUM SERPL-MCNC: 1.8 MG/DL (ref 1.6–2.6)
MCH RBC QN AUTO: 26.7 PG (ref 26.6–33)
MCHC RBC AUTO-ENTMCNC: 32.5 G/DL (ref 31.5–35.7)
MCV RBC AUTO: 82.2 FL (ref 79–97)
MONOCYTES # BLD AUTO: 1.36 10*3/MM3 (ref 0.1–0.9)
MONOCYTES NFR BLD AUTO: 10.2 % (ref 5–12)
NEUTROPHILS NFR BLD AUTO: 10.06 10*3/MM3 (ref 1.7–7)
NEUTROPHILS NFR BLD AUTO: 75.4 % (ref 42.7–76)
NRBC BLD AUTO-RTO: 0 /100 WBC (ref 0–0.2)
PLATELET # BLD AUTO: 230 10*3/MM3 (ref 140–450)
PMV BLD AUTO: 10.7 FL (ref 6–12)
POTASSIUM SERPL-SCNC: 3 MMOL/L (ref 3.5–5.2)
PROT SERPL-MCNC: 6.9 G/DL (ref 6–8.5)
RBC # BLD AUTO: 4.72 10*6/MM3 (ref 4.14–5.8)
SODIUM SERPL-SCNC: 137 MMOL/L (ref 136–145)
WBC NRBC COR # BLD: 13.34 10*3/MM3 (ref 3.4–10.8)

## 2023-04-25 PROCEDURE — G0378 HOSPITAL OBSERVATION PER HR: HCPCS

## 2023-04-25 PROCEDURE — G0379 DIRECT REFER HOSPITAL OBSERV: HCPCS

## 2023-04-25 PROCEDURE — 85025 COMPLETE CBC W/AUTO DIFF WBC: CPT

## 2023-04-25 PROCEDURE — 84207 ASSAY OF VITAMIN B-6: CPT

## 2023-04-25 PROCEDURE — 96372 THER/PROPH/DIAG INJ SC/IM: CPT

## 2023-04-25 PROCEDURE — 93970 EXTREMITY STUDY: CPT

## 2023-04-25 PROCEDURE — 36415 COLL VENOUS BLD VENIPUNCTURE: CPT

## 2023-04-25 PROCEDURE — 80053 COMPREHEN METABOLIC PANEL: CPT

## 2023-04-25 PROCEDURE — 96361 HYDRATE IV INFUSION ADD-ON: CPT

## 2023-04-25 PROCEDURE — 99024 POSTOP FOLLOW-UP VISIT: CPT | Performed by: SURGERY

## 2023-04-25 PROCEDURE — 82306 VITAMIN D 25 HYDROXY: CPT

## 2023-04-25 PROCEDURE — 82746 ASSAY OF FOLIC ACID SERUM: CPT

## 2023-04-25 PROCEDURE — 84425 ASSAY OF VITAMIN B-1: CPT

## 2023-04-25 PROCEDURE — 84590 ASSAY OF VITAMIN A: CPT

## 2023-04-25 PROCEDURE — 82607 VITAMIN B-12: CPT

## 2023-04-25 PROCEDURE — 25010000002 CYANOCOBALAMIN PER 1000 MCG: Performed by: SURGERY

## 2023-04-25 PROCEDURE — 82525 ASSAY OF COPPER: CPT

## 2023-04-25 PROCEDURE — 84446 ASSAY OF VITAMIN E: CPT

## 2023-04-25 PROCEDURE — 83735 ASSAY OF MAGNESIUM: CPT | Performed by: SURGERY

## 2023-04-25 PROCEDURE — 84630 ASSAY OF ZINC: CPT

## 2023-04-25 RX ORDER — SODIUM CHLORIDE 0.9 % (FLUSH) 0.9 %
10 SYRINGE (ML) INJECTION EVERY 12 HOURS SCHEDULED
Status: CANCELLED | OUTPATIENT
Start: 2023-04-25

## 2023-04-25 RX ORDER — CYANOCOBALAMIN 1000 UG/ML
1000 INJECTION, SOLUTION INTRAMUSCULAR; SUBCUTANEOUS ONCE
Status: COMPLETED | OUTPATIENT
Start: 2023-04-25 | End: 2023-04-25

## 2023-04-25 RX ORDER — SODIUM CHLORIDE 0.9 % (FLUSH) 0.9 %
10 SYRINGE (ML) INJECTION AS NEEDED
Status: CANCELLED | OUTPATIENT
Start: 2023-04-25

## 2023-04-25 RX ORDER — SODIUM CHLORIDE 0.9 % (FLUSH) 0.9 %
10 SYRINGE (ML) INJECTION AS NEEDED
Status: DISCONTINUED | OUTPATIENT
Start: 2023-04-25 | End: 2023-05-01 | Stop reason: HOSPADM

## 2023-04-25 RX ORDER — THIAMINE HYDROCHLORIDE 100 MG/ML
100 INJECTION, SOLUTION INTRAMUSCULAR; INTRAVENOUS DAILY
Status: DISCONTINUED | OUTPATIENT
Start: 2023-04-26 | End: 2023-04-26

## 2023-04-25 RX ORDER — ONDANSETRON 2 MG/ML
4 INJECTION INTRAMUSCULAR; INTRAVENOUS EVERY 6 HOURS PRN
Status: CANCELLED | OUTPATIENT
Start: 2023-04-25

## 2023-04-25 RX ORDER — THIAMINE HYDROCHLORIDE 100 MG/ML
100 INJECTION, SOLUTION INTRAMUSCULAR; INTRAVENOUS DAILY
Status: DISCONTINUED | OUTPATIENT
Start: 2023-04-26 | End: 2023-04-25

## 2023-04-25 RX ORDER — SODIUM CHLORIDE 9 MG/ML
40 INJECTION, SOLUTION INTRAVENOUS AS NEEDED
Status: CANCELLED | OUTPATIENT
Start: 2023-04-25

## 2023-04-25 RX ORDER — SODIUM CHLORIDE 9 MG/ML
40 INJECTION, SOLUTION INTRAVENOUS AS NEEDED
Status: DISCONTINUED | OUTPATIENT
Start: 2023-04-25 | End: 2023-05-01 | Stop reason: HOSPADM

## 2023-04-25 RX ORDER — SODIUM CHLORIDE, SODIUM LACTATE, POTASSIUM CHLORIDE, CALCIUM CHLORIDE 600; 310; 30; 20 MG/100ML; MG/100ML; MG/100ML; MG/100ML
100 INJECTION, SOLUTION INTRAVENOUS CONTINUOUS
Status: CANCELLED | OUTPATIENT
Start: 2023-04-25

## 2023-04-25 RX ORDER — POTASSIUM CHLORIDE 750 MG/1
40 CAPSULE, EXTENDED RELEASE ORAL AS NEEDED
Status: DISCONTINUED | OUTPATIENT
Start: 2023-04-25 | End: 2023-04-26

## 2023-04-25 RX ORDER — POTASSIUM CHLORIDE 20 MEQ/1
TABLET, EXTENDED RELEASE ORAL
Qty: 20 TABLET | Refills: 0 | Status: SHIPPED | OUTPATIENT
Start: 2023-04-25 | End: 2023-05-01 | Stop reason: HOSPADM

## 2023-04-25 RX ORDER — METOCLOPRAMIDE 5 MG/1
5 TABLET ORAL
Qty: 14 TABLET | Refills: 1 | Status: ON HOLD | OUTPATIENT
Start: 2023-04-25 | End: 2023-04-29

## 2023-04-25 RX ORDER — SODIUM CHLORIDE, SODIUM LACTATE, POTASSIUM CHLORIDE, CALCIUM CHLORIDE 600; 310; 30; 20 MG/100ML; MG/100ML; MG/100ML; MG/100ML
100 INJECTION, SOLUTION INTRAVENOUS CONTINUOUS
Status: DISCONTINUED | OUTPATIENT
Start: 2023-04-25 | End: 2023-04-26

## 2023-04-25 RX ORDER — SODIUM CHLORIDE 0.9 % (FLUSH) 0.9 %
10 SYRINGE (ML) INJECTION EVERY 12 HOURS SCHEDULED
Status: DISCONTINUED | OUTPATIENT
Start: 2023-04-25 | End: 2023-05-01 | Stop reason: HOSPADM

## 2023-04-25 RX ORDER — THIAMINE HYDROCHLORIDE 100 MG/ML
100 INJECTION, SOLUTION INTRAMUSCULAR; INTRAVENOUS DAILY
Status: CANCELLED | OUTPATIENT
Start: 2023-04-25

## 2023-04-25 RX ORDER — THIAMINE HYDROCHLORIDE 100 MG/ML
100 INJECTION, SOLUTION INTRAMUSCULAR; INTRAVENOUS DAILY
Status: SHIPPED | OUTPATIENT
Start: 2023-04-26

## 2023-04-25 RX ORDER — CYANOCOBALAMIN 1000 UG/ML
1000 INJECTION, SOLUTION INTRAMUSCULAR; SUBCUTANEOUS
Status: CANCELLED | OUTPATIENT
Start: 2023-04-25

## 2023-04-25 RX ORDER — CYANOCOBALAMIN 1000 UG/ML
1000 INJECTION, SOLUTION INTRAMUSCULAR; SUBCUTANEOUS
Status: DISCONTINUED | OUTPATIENT
Start: 2023-04-25 | End: 2023-04-25

## 2023-04-25 RX ORDER — ONDANSETRON 2 MG/ML
4 INJECTION INTRAMUSCULAR; INTRAVENOUS EVERY 6 HOURS PRN
Status: DISCONTINUED | OUTPATIENT
Start: 2023-04-25 | End: 2023-04-26

## 2023-04-25 RX ORDER — POTASSIUM CHLORIDE 750 MG/1
40 CAPSULE, EXTENDED RELEASE ORAL ONCE
Status: COMPLETED | OUTPATIENT
Start: 2023-04-25 | End: 2023-04-25

## 2023-04-25 RX ADMIN — POTASSIUM CHLORIDE 40 MEQ: 10 CAPSULE, COATED, EXTENDED RELEASE ORAL at 23:22

## 2023-04-25 RX ADMIN — SODIUM CHLORIDE, POTASSIUM CHLORIDE, SODIUM LACTATE AND CALCIUM CHLORIDE 100 ML/HR: 600; 310; 30; 20 INJECTION, SOLUTION INTRAVENOUS at 22:59

## 2023-04-25 RX ADMIN — Medication 10 ML: at 23:05

## 2023-04-25 RX ADMIN — CYANOCOBALAMIN 1000 MCG: 1000 INJECTION, SOLUTION INTRAMUSCULAR at 23:22

## 2023-04-25 NOTE — PROGRESS NOTES
"  Patient Care Team:  Angelo Mckinnon APRN as PCP - General (Family Medicine)  Francie Cabrera APRN as Nurse Practitioner (Nurse Practitioner)    Subjective     Santiago Mcgraw is a 47 y.o. male.     Santiago is post op approximately 2 months from his sleeve gastrectomy procedure.  He still has intermittent nausea and vomiting.  He is currently on his stage IV diet.  He stated yesterday he woke up with vomiting.  He had then remained nauseated throughout the day today.  He was able to tolerate his stage IV diet on Saturday and Sunday which included ground turkey, solids and vegetables.  He does not know what exacerbates his nausea or vomiting.  He now is fraught with diarrhea.  He has been having recurrent gout symptoms as well occurring between the right and left foot.  Subsequent questioning he disclosed that he had pain in his left thigh on the medial aspect/location.        Review Of Systems:  General ROS: positive for  - fatigue  Respiratory ROS: no cough, shortness of breath, or wheezing  Cardiovascular ROS: no chest pain or dyspnea on exertion  Gastrointestinal ROS: no abdominal pain, change in bowel habits, or black or bloody stools  Positive for nausea and vomiting  Musculoskeletal ROS: positive for - muscle pain    The following portions of the patient's history were reviewed and updated as appropriate: allergies, current medications, past family history, past medical history, past social history, past surgical history and problem list.    Objective   /87 (BP Location: Right arm, Patient Position: Sitting, Cuff Size: Adult)   Pulse 94   Temp 98 °F (36.7 °C)   Ht 182.9 cm (72\")   Wt (!) 148 kg (326 lb 6.4 oz)   SpO2 99%   BMI 44.27 kg/m²       04/25/23  1400   Weight: (!) 148 kg (326 lb 6.4 oz)        General Appearance:  awake, alert, oriented, in no acute distress  Lungs:  Normal expansion.  Clear to auscultation.  No rales, rhonchi, or wheezing.  Heart:  Heart regular rate and " rhythm  Abdomen:  Soft, non-tender, normal bowel sounds; no bruits, organomegaly or masses.  Abnormal shape: obese  Extremities: Right thigh tenderness in the medial aspect no gross swelling however there is pain presumably secondary to his gout in the left foot with some swelling.    ASSESSMENT/ PLAN    Encounter Diagnoses   Name Primary?   • Nausea and vomiting, unspecified vomiting type Yes   • Status post laparoscopic sleeve gastrectomy      The patient's intermittent nausea and vomiting is of a concern and I have explained to the patient that I will be obtaining a second opinion.  He does have a history of Micrell cholelithiasis however his symptoms beyond the nausea and intermittent vomiting are not consistent with acute cholecystitis.  He also has obtained labs per my recommendation.  He complained of numbness in his right lower quadrant location consistent with his incisional site.  My concern is this is more than likely due to the surgical site however I would want to rule out vitamin depletion or deficiency due to his poor intake.  Of note the patient has been admittedly utilizing vitamin patches consistently but I will reassess this as well.  I will obtain a CBC and CMP.  The CBC has showed mild leukocytosis.  The patient does not complain of abdominal pain so I do not believe it is related to his gallbladder and probably more related to his gout which has reflared up again.   He is currently stable and will await a second opinion.  If his labs return with signs consistent with elevation of his LFTs then this would prompt more of a recent of the cause of his symptoms and I would be more confident in removing his gallbladder as the etiology/offending agent of his nausea.      04/25/23  14:23 CDT  Patient Care Team:  Angelo Mckinnon APRN as PCP - General (Family Medicine)  Francie Cabrera APRN as Nurse Practitioner (Nurse Practitioner)  Solitario Luo MD FACS

## 2023-04-25 NOTE — TELEPHONE ENCOUNTER
Notify the patient of his low potassium and placed a prescription for potassium supplementation.  He is going to take that as ordered.  I recommended we repeat his potassium in 2 days.  I did advise the patient that if he feels chest pain or chest palpitations that his potassium might not have responded to the oral medications and they should be addressed sooner through the emergency department visit.  I am awaiting his duplex study results to rule out DVT.  He will await consultation/contact from [facility] which I have reached out to them and they have received a fax and will be contacting our patient soon.  I reviewed the labs with the patient as well noting that his liver enzymes are within normal limits and no gross signs of acute cholecystitis are seen on the study.

## 2023-04-25 NOTE — TELEPHONE ENCOUNTER
"Called patient back and discussed issues    He states he woke up last night around 12:30 and had \"terrible vomiting\" and had diarrhea. This morning he states he had uncontrollable diarrhea and it caused accidents on himself. He has no known fever.     He admits a numbness feeling to his lower abdomen.     He admits some pain to RUQ. He states yesterday he took in 30 ounces.    He denies nausea on Saturday and Sunday   On Saturday he ate taco meat  On Sunday he states he ate: peas, 1/8 c of turkey meatloaf around 4 PM  2 protein shakes total and one was later that evening  He states he woke up nauseated Monday morning     "

## 2023-04-26 ENCOUNTER — APPOINTMENT (OUTPATIENT)
Dept: CT IMAGING | Facility: HOSPITAL | Age: 48
End: 2023-04-26
Payer: COMMERCIAL

## 2023-04-26 LAB
25(OH)D3 SERPL-MCNC: 16 NG/ML (ref 30–100)
ALBUMIN SERPL-MCNC: 3.2 G/DL (ref 3.5–5.2)
ALBUMIN/GLOB SERPL: 1.2 G/DL
ALP SERPL-CCNC: 70 U/L (ref 39–117)
ALT SERPL W P-5'-P-CCNC: 15 U/L (ref 1–41)
ANION GAP SERPL CALCULATED.3IONS-SCNC: 11 MMOL/L (ref 5–15)
AST SERPL-CCNC: 11 U/L (ref 1–40)
BILIRUB SERPL-MCNC: 0.7 MG/DL (ref 0–1.2)
BUN SERPL-MCNC: 11 MG/DL (ref 6–20)
BUN/CREAT SERPL: 11.1 (ref 7–25)
CALCIUM SPEC-SCNC: 8.6 MG/DL (ref 8.6–10.5)
CHLORIDE SERPL-SCNC: 101 MMOL/L (ref 98–107)
CO2 SERPL-SCNC: 28 MMOL/L (ref 22–29)
CREAT SERPL-MCNC: 0.99 MG/DL (ref 0.76–1.27)
EGFRCR SERPLBLD CKD-EPI 2021: 94 ML/MIN/1.73
FOLATE SERPL-MCNC: 2.74 NG/ML (ref 4.78–24.2)
GLOBULIN UR ELPH-MCNC: 2.6 GM/DL
GLUCOSE SERPL-MCNC: 107 MG/DL (ref 65–99)
IRON 24H UR-MRATE: 46 MCG/DL (ref 59–158)
IRON SATN MFR SERPL: 30 % (ref 20–50)
MAGNESIUM SERPL-MCNC: 1.8 MG/DL (ref 1.6–2.6)
PHOSPHATE SERPL-MCNC: 3.6 MG/DL (ref 2.5–4.5)
POTASSIUM SERPL-SCNC: 3.2 MMOL/L (ref 3.5–5.2)
PROT SERPL-MCNC: 5.8 G/DL (ref 6–8.5)
SODIUM SERPL-SCNC: 140 MMOL/L (ref 136–145)
TIBC SERPL-MCNC: 152 MCG/DL (ref 298–536)
TRANSFERRIN SERPL-MCNC: 102 MG/DL (ref 200–360)
VIT B1 BLD-SCNC: 54.2 NMOL/L (ref 66.5–200)
VIT B12 BLD-MCNC: 208 PG/ML (ref 211–946)
ZINC SERPL-MCNC: 83 UG/DL (ref 44–115)

## 2023-04-26 PROCEDURE — 80053 COMPREHEN METABOLIC PANEL: CPT | Performed by: SURGERY

## 2023-04-26 PROCEDURE — 74177 CT ABD & PELVIS W/CONTRAST: CPT

## 2023-04-26 PROCEDURE — 25010000002 ONDANSETRON PER 1 MG: Performed by: INTERNAL MEDICINE

## 2023-04-26 PROCEDURE — 96375 TX/PRO/DX INJ NEW DRUG ADDON: CPT

## 2023-04-26 PROCEDURE — 96372 THER/PROPH/DIAG INJ SC/IM: CPT

## 2023-04-26 PROCEDURE — 25010000002 METOCLOPRAMIDE PER 10 MG: Performed by: INTERNAL MEDICINE

## 2023-04-26 PROCEDURE — 96368 THER/DIAG CONCURRENT INF: CPT

## 2023-04-26 PROCEDURE — 96361 HYDRATE IV INFUSION ADD-ON: CPT

## 2023-04-26 PROCEDURE — 83735 ASSAY OF MAGNESIUM: CPT | Performed by: SURGERY

## 2023-04-26 PROCEDURE — 84425 ASSAY OF VITAMIN B-1: CPT | Performed by: SURGERY

## 2023-04-26 PROCEDURE — 96376 TX/PRO/DX INJ SAME DRUG ADON: CPT

## 2023-04-26 PROCEDURE — 96366 THER/PROPH/DIAG IV INF ADDON: CPT

## 2023-04-26 PROCEDURE — 94799 UNLISTED PULMONARY SVC/PX: CPT

## 2023-04-26 PROCEDURE — 96365 THER/PROPH/DIAG IV INF INIT: CPT

## 2023-04-26 PROCEDURE — 25510000001 IOPAMIDOL 61 % SOLUTION: Performed by: SURGERY

## 2023-04-26 PROCEDURE — G0378 HOSPITAL OBSERVATION PER HR: HCPCS

## 2023-04-26 PROCEDURE — 25010000002 THIAMINE PER 100 MG: Performed by: SURGERY

## 2023-04-26 PROCEDURE — 84100 ASSAY OF PHOSPHORUS: CPT | Performed by: SURGERY

## 2023-04-26 PROCEDURE — 83540 ASSAY OF IRON: CPT | Performed by: SURGERY

## 2023-04-26 PROCEDURE — 99222 1ST HOSP IP/OBS MODERATE 55: CPT | Performed by: INTERNAL MEDICINE

## 2023-04-26 PROCEDURE — C1751 CATH, INF, PER/CENT/MIDLINE: HCPCS

## 2023-04-26 PROCEDURE — 84466 ASSAY OF TRANSFERRIN: CPT | Performed by: SURGERY

## 2023-04-26 RX ORDER — POTASSIUM CHLORIDE, DEXTROSE MONOHYDRATE AND SODIUM CHLORIDE 300; 5; 900 MG/100ML; G/100ML; MG/100ML
50 INJECTION, SOLUTION INTRAVENOUS CONTINUOUS
Status: DISCONTINUED | OUTPATIENT
Start: 2023-04-26 | End: 2023-04-30

## 2023-04-26 RX ORDER — SODIUM CHLORIDE 0.9 % (FLUSH) 0.9 %
20 SYRINGE (ML) INJECTION AS NEEDED
Status: DISCONTINUED | OUTPATIENT
Start: 2023-04-26 | End: 2023-05-01 | Stop reason: HOSPADM

## 2023-04-26 RX ORDER — THIAMINE HYDROCHLORIDE 100 MG/ML
50 INJECTION, SOLUTION INTRAMUSCULAR; INTRAVENOUS 2 TIMES DAILY
Status: DISCONTINUED | OUTPATIENT
Start: 2023-04-26 | End: 2023-04-29

## 2023-04-26 RX ORDER — SODIUM CHLORIDE 0.9 % (FLUSH) 0.9 %
10 SYRINGE (ML) INJECTION EVERY 12 HOURS SCHEDULED
Status: DISCONTINUED | OUTPATIENT
Start: 2023-04-26 | End: 2023-05-01 | Stop reason: HOSPADM

## 2023-04-26 RX ORDER — LIDOCAINE HYDROCHLORIDE 10 MG/ML
1 INJECTION, SOLUTION EPIDURAL; INFILTRATION; INTRACAUDAL; PERINEURAL ONCE
Status: COMPLETED | OUTPATIENT
Start: 2023-04-26 | End: 2023-04-26

## 2023-04-26 RX ORDER — ONDANSETRON HCL IN 0.9 % NACL 8 MG/50 ML
8 INTRAVENOUS SOLUTION, PIGGYBACK (ML) INTRAVENOUS EVERY 8 HOURS
Status: DISCONTINUED | OUTPATIENT
Start: 2023-04-26 | End: 2023-04-27

## 2023-04-26 RX ORDER — SODIUM CHLORIDE 0.9 % (FLUSH) 0.9 %
10 SYRINGE (ML) INJECTION AS NEEDED
Status: DISCONTINUED | OUTPATIENT
Start: 2023-04-26 | End: 2023-05-01 | Stop reason: HOSPADM

## 2023-04-26 RX ORDER — PROMETHAZINE HYDROCHLORIDE 12.5 MG/1
12.5 TABLET ORAL EVERY 8 HOURS PRN
COMMUNITY

## 2023-04-26 RX ORDER — METOCLOPRAMIDE HYDROCHLORIDE 5 MG/ML
10 INJECTION INTRAMUSCULAR; INTRAVENOUS EVERY 8 HOURS
Status: DISCONTINUED | OUTPATIENT
Start: 2023-04-26 | End: 2023-04-27

## 2023-04-26 RX ORDER — SODIUM CHLORIDE 9 MG/ML
40 INJECTION, SOLUTION INTRAVENOUS AS NEEDED
Status: DISCONTINUED | OUTPATIENT
Start: 2023-04-26 | End: 2023-05-01 | Stop reason: HOSPADM

## 2023-04-26 RX ADMIN — METOCLOPRAMIDE HYDROCHLORIDE 10 MG: 5 INJECTION INTRAMUSCULAR; INTRAVENOUS at 20:28

## 2023-04-26 RX ADMIN — Medication 10 ML: at 12:07

## 2023-04-26 RX ADMIN — I.V. FAT EMULSION 50 G: 20 EMULSION INTRAVENOUS at 18:30

## 2023-04-26 RX ADMIN — POTASSIUM CHLORIDE, DEXTROSE MONOHYDRATE AND SODIUM CHLORIDE 125 ML/HR: 300; 5; 900 INJECTION, SOLUTION INTRAVENOUS at 12:06

## 2023-04-26 RX ADMIN — POTASSIUM CHLORIDE 40 MEQ: 10 CAPSULE, COATED, EXTENDED RELEASE ORAL at 11:14

## 2023-04-26 RX ADMIN — ONDANSETRON HYDROCHLORIDE 8 MG: 2 INJECTION INTRAMUSCULAR; INTRAVENOUS at 20:28

## 2023-04-26 RX ADMIN — Medication 10 ML: at 08:19

## 2023-04-26 RX ADMIN — IOPAMIDOL 100 ML: 612 INJECTION, SOLUTION INTRAVENOUS at 13:01

## 2023-04-26 RX ADMIN — Medication 10 ML: at 20:31

## 2023-04-26 RX ADMIN — POTASSIUM CHLORIDE, DEXTROSE MONOHYDRATE AND SODIUM CHLORIDE 125 ML/HR: 300; 5; 900 INJECTION, SOLUTION INTRAVENOUS at 22:08

## 2023-04-26 RX ADMIN — ONDANSETRON HYDROCHLORIDE 8 MG: 2 INJECTION INTRAMUSCULAR; INTRAVENOUS at 12:06

## 2023-04-26 RX ADMIN — LIDOCAINE HYDROCHLORIDE ANHYDROUS 1 ML: 10 INJECTION, SOLUTION INFILTRATION at 11:28

## 2023-04-26 RX ADMIN — METOCLOPRAMIDE HYDROCHLORIDE 10 MG: 5 INJECTION INTRAMUSCULAR; INTRAVENOUS at 12:16

## 2023-04-26 RX ADMIN — ASCORBIC ACID, VITAMIN A PALMITATE, CHOLECALCIFEROL, THIAMINE HYDROCHLORIDE, RIBOFLAVIN-5 PHOSPHATE SODIUM, PYRIDOXINE HYDROCHLORIDE, NIACINAMIDE, DEXPANTHENOL, ALPHA-TOCOPHEROL ACETATE, VITAMIN K1, FOLIC ACID, BIOTIN, CYANOCOBALAMIN: 200; 3300; 200; 6; 3.6; 6; 40; 15; 10; 150; 600; 60; 5 INJECTION, SOLUTION INTRAVENOUS at 18:30

## 2023-04-26 RX ADMIN — THIAMINE HYDROCHLORIDE 100 MG: 100 INJECTION, SOLUTION INTRAMUSCULAR; INTRAVENOUS at 00:39

## 2023-04-26 RX ADMIN — SODIUM CHLORIDE, POTASSIUM CHLORIDE, SODIUM LACTATE AND CALCIUM CHLORIDE 100 ML/HR: 600; 310; 30; 20 INJECTION, SOLUTION INTRAVENOUS at 08:19

## 2023-04-26 RX ADMIN — Medication 10 ML: at 20:29

## 2023-04-26 RX ADMIN — THIAMINE HYDROCHLORIDE 50 MG: 100 INJECTION, SOLUTION INTRAMUSCULAR; INTRAVENOUS at 20:30

## 2023-04-26 NOTE — PLAN OF CARE
Goal Outcome Evaluation:              Outcome Evaluation: RD nutrition assessment completed.  TPN recommendations in progress notes, and pharmacy notified.  Pt is currently receiving a Bariatric Stage 5 diet, and consumed 100% at breakfast this morning.  For possible transfer to [place].  Will continue to monitor po intake and TPN tolerance, as well as follow for further d/c needs

## 2023-04-26 NOTE — PLAN OF CARE
Goal Outcome Evaluation:  Admitted to  for dehydration post gastric sleeve x 2 months.  IV infusing without problems.  Denies nausea or vomiting.  Admission completed.  VSS.  Safety maintained.          Problem: Adult Inpatient Plan of Care  Goal: Plan of Care Review  Outcome: Ongoing, Progressing  Goal: Patient-Specific Goal (Individualized)  Outcome: Ongoing, Progressing  Goal: Absence of Hospital-Acquired Illness or Injury  Outcome: Ongoing, Progressing  Intervention: Identify and Manage Fall Risk  Recent Flowsheet Documentation  Taken 4/26/2023 0400 by Marion Schuster RN  Safety Promotion/Fall Prevention: safety round/check completed  Taken 4/26/2023 0200 by Marion Schuster RN  Safety Promotion/Fall Prevention: safety round/check completed  Taken 4/26/2023 0000 by Marion Schuster RN  Safety Promotion/Fall Prevention: safety round/check completed  Taken 4/25/2023 2310 by Marion Schuster RN  Safety Promotion/Fall Prevention:   safety round/check completed   room organization consistent   nonskid shoes/slippers when out of bed   clutter free environment maintained   assistive device/personal items within reach  Intervention: Prevent and Manage VTE (Venous Thromboembolism) Risk  Recent Flowsheet Documentation  Taken 4/25/2023 2200 by Marion Schuster RN  VTE Prevention/Management:   sequential compression devices on   bilateral  Goal: Optimal Comfort and Wellbeing  Outcome: Ongoing, Progressing  Goal: Readiness for Transition of Care  Outcome: Ongoing, Progressing  Intervention: Mutually Develop Transition Plan  Recent Flowsheet Documentation  Taken 4/25/2023 2309 by Marion Schuster RN  Transportation Anticipated: agency  Patient/Family Anticipated Services at Transition: none  Patient/Family Anticipates Transition to: home  Taken 4/25/2023 2308 by Marion Schuster RN  Equipment Currently Used at Home: none

## 2023-04-26 NOTE — PROGRESS NOTES
"Pharmacy Parenteral Nutrition Initial Evaluation    Santiago Mcgraw is a  47 y.o. male that pharmacy has been consulted for initial evaluation of TPN for post-op laparoscopic sleeve gastrectomy with continued nausea/vomiting, concern for nutritional depletion of protein per Dr. Luo' request.  [Ht: 182.9 cm (72\"); Wt: (!) 147 kg (324 lb 12.8 oz)]  Admission Date: 425      Subjective:  Progress notes reviewed.     Objective:  Temp (24hrs), Av.1 °F (36.7 °C), Min:97.7 °F (36.5 °C), Max:98.3 °F (36.8 °C)        Results from last 7 days   Lab Units 23  0449 23  15323  15323  0526   SODIUM mmol/L 140  --  137  --  140   POTASSIUM mmol/L 3.2*  --  3.0* 3.4* 3.0*   CHLORIDE mmol/L 101  --  98  --  102   MAGNESIUM mg/dL 1.8 1.8  --   --   --    PHOSPHORUS mg/dL 3.6  --   --   --   --    CO2 mmol/L 28.0  --  28.0  --  27.0   TOTAL PROTEIN g/dL 5.8*  --  6.9  --  5.6*   BUN mg/dL 11  --  11  --  13   CREATININE mg/dL 0.99  --  1.12  --  1.06   CALCIUM mg/dL 8.6  --  9.2  --  8.6   PREALBUMIN mg/dL  --   --   --   --  16.5*   ALBUMIN g/dL 3.2*  --  3.7  --  3.3*   BILIRUBIN mg/dL 0.7  --  0.9  --  0.8   ALK PHOS U/L 70  --  85  --  76   ALT (SGPT) U/L 15  --  19  --  32   AST (SGOT) U/L 11  --  14  --  19   GLUCOSE mg/dL 107*  --  110*  --  116*       No results found for: TRIG    Corrected Ca (Calcium + (0.8 * (4-albumin)) = 9.24    Results from last 7 days   Lab Units 23  15323  0526   WBC 10*3/mm3 13.34* 6.64   HEMOGLOBIN g/dL 12.6* 12.1*   HEMATOCRIT % 38.8 37.9   PLATELETS 10*3/mm3 230 184       estimated creatinine clearance is 135.5 mL/min (by C-G formula based on SCr of 0.99 mg/dL).    Intake & Output (last 3 days)        07 07 0700    P.O.   550 360    I.V. (mL/kg)   800 (5.4)     Total Intake(mL/kg)   1350 (9.2) 360 (2.4)    Net   +1350 +360                  Above labs " reviewed.       Dietitian Recommendations  Diet Orders (active) (From admission, onward)     Start     Ordered    04/26/23 1800  Adult Standard Central TPN  Every 24 Hours Scheduled (TPN)         04/26/23 1221    04/26/23 1800  Fat Emulsion Plant Based (INTRALIPID,LIPOSYN) 20 % infusion 50 g  Every 24 Hours Scheduled (TPN)         04/26/23 1222    04/25/23 2218  Diet: Regular/House Diet; Bariatric Stage 5, No Straw; Texture: Regular Texture (IDDSI 7); Fluid Consistency: Thin (IDDSI 0)  Diet Effective Now         04/25/23 2217              Current TPN Regimen Recommendation:  Dextrose 15% / Amino Acid 5% at goal rate of 100 mL/hr.  20% Lipid Emulsion 250 mL every 24 hours.    Assessment:  Ideal Body Weight (IBW) (kg): 82.07    Goal   Protein: (120 grams/day)   Dextrose: 1224 Kcal/day (360 grams/day)   Lipids: 250 ml of 20% lipid infusion 7 days/week   Volume: 2400 ml (100 ml/hr over 24 hrs daily)    Plan:  1. Patient started on TPN  2. Will start TPN at lower rate than goal to taper up as patient tolerates. Will initiate the TPN with the following macros as above per goal, but the first bag will be 25 mL/hr then advance to goal of 100 mL/hr    Based on the above labs, will add the following electrolytes/additives to the TPN.    Multivitamin 10 mL   Trace Elements 1 mL    3. Electrolyte replacements can be ordered as needed.   4. Pharmacy will continue to follow closely and make adjustments to TPN as necessary.    Gopal Kirkland, PharmD  4/26/2023  13:28 CDT

## 2023-04-26 NOTE — PROGRESS NOTES
Brief GI note    CT abdomen pelvis p.o. and IV contrast completed.  I reviewed the images as well as the report.  The patient has some distended loops of bowel in the left mid abdomen with some thickened mucosal walls without mesenteric stranding or inflammation.  Mesenteric vasculature is patent including portal vein and SMV.  These findings could represent an enteritis, questionable postoperative issue.  There is no evidence of bowel obstruction and contrast passes to right colon.    Continue conservative care for now.  Doing an endoscopy will have very low yield and not supported given the CT findings.    GI will follow up tomorrow.

## 2023-04-26 NOTE — CONSULTS
Patient or patient's representative gives informed consent after an explanation of the risks (air embolism; catheter occlusion; phlebitis; catheter infection; bloodstream infection; vein thrombosis; hematoma/bleeding at the insertion site; slight discomfort; accidental puncture of an artery, nerve, or tendon; dislodging of device), benefits (longer dwell time, outpatient treatment, medications that cannot run peripherally), and alternatives (short peripheral catheter, centrally inserted central line, or permanent catheter) of PICC placement. Time provided to ask and answer questions.    Pt had 4 [place] double lumen PICC placed in left basilic vein. Pt tolerated procedure well. 47 cm of catheter internal and 0 cm external. Tip confirmation by 3cg. All lumens flush and draw well. Sterile dressing applied.

## 2023-04-26 NOTE — TELEPHONE ENCOUNTER
16:20   Contacted patient about labwork and I have sent in potassium 40 MEQ. Patient aware Dr Luo will call with duplex study results. We will repeat his K+ levels in 2 days.  I reviewed other labs and advised patient to call office if any symptoms worsen or new symptoms begin. He verbalizes understanding and states he is currently at the pharmacy and will wait on prescription to be filled.

## 2023-04-26 NOTE — CONSULTS
04/26/23  Santiago Mcgraw   1975    DATE OF ADMISSION:   LENGTH OF STAY:  4/25/2023     0 days    Consultation Regarding:     Evaluation and/or management for recurrent nausea and vomiting    REQUESTING PHYSICIAN:  Solitario Luo MD    HPI:     Santiago Mcgraw is an 47 y.o. male with past medical history that includes gastric sleeve surgery 2/23/23 for morbid obesity, BMI 44.05 currently, HTN, PAPITO on CPAP and joint pain presents with recurrent N/V that has been ongoing for 3 weeks now. He has lost about 64 lbs since the surgery according to the chart.  This is his 3rd admission for N/V.    Patient apparently did very well with progression of his diet right after the surgery but about 3 weeks ago developed an acute illness consistent of fever and severe fatigue with associated recurrent nausea and vomiting.  He had no upper respiratory symptoms but tested himself at home for COVID and was positive.  He had difficulty tolerating p.o. diet for about 1 week and then the second week gradually began to tolerate some oral intake.  However, he and his wife state that he has had persistent nausea with difficulty and then only intermittent vomiting.  For instance on Friday he was able to eat some plant-based protein, 2 protein shakes, moderate oral liquid intake, and some carrots.  He did well on Saturday and was able to tolerate p.o. but then on Sunday woke up with significant nausea and vomiting.  He then had 2 episodes of diarrhea one occurring Monday evening and the other on Tuesday morning.A bowel movement after that had just been loose but more formed.  He denies ongoing diarrhea.      Pt currently on Stage IV diet. He was just discharged 4/20/23 for same symptoms, treated conservatively and discharged on reglan, Zofran, and PPI.  He tells me he was taking the Reglan scheduled twice daily as well as a PPI but has been on Zofran only as needed and has taken very little of it at home.    He presents again with  recurrent N/V that occurred Monday 4/24/23. He had tolerated a Stage IV diet of turkey and vegetables Saturday and Sunday but work up Monday vomiting.  He is also having diarrhea. Denies F/C/NS, abd pain.    He has been in close contact with Dr. Luo via secure chat and then saw him in follow-up yesterday.  After labs revealed significant electrolyte disturbance, he was told to return and be admitted through the ER.    Patient has had no ongoing diarrhea since admission, he has had only mild nausea but no ongoing vomiting.  He has tolerated some juice this morning.  Denies abdominal pain.    Initial work-up included a water-soluble contrast upper GI series which was limited due to patient inability to swallow a large amount of contrast material.  The exam was suboptimal.  There was a visualized small hiatus hernia and moderate spontaneous reflux but the stomach was not adequately visualized.    This was followed up with a single contrast upper GI series with barium 4/19/2023 revealing no evidence of bowel obstruction or marked bowel luminal narrowing and no evidence of contrast leak at the sleeve site.  There was free flow of contrast through the esophagus and into the stomach and into the duodenum and jejunum but there was slight delayed emptying of the stomach.    Labs reveal normal LFTs multiple times, and only mild leukocytosis with a white count of 13.34 today.  No left shift.    According to him and his wife, a transfer request has been placed to [place].    Past Medical History:   Diagnosis Date   • Hypertension    • Obesity    • Obstructive sleep apnea treated with continuous positive airway pressure (CPAP)    • Pain     back and joint   • Personal history of COVID-19 05/2022   • Personal history of COVID-19 01/2023       Past Surgical History:   Procedure Laterality Date   • ENDOSCOPY N/A 01/06/2023    Procedure: ESOPHAGOGASTRODUODENOSCOPY WITH ANESTHESIA;  Surgeon: Solitario Luo MD;  Location:   [facility];  Service: General;  Laterality: N/A;  pre morbid obesity  post  miriam rodrigues aprn   • GASTRIC SLEEVE LAPAROSCOPIC N/A 2/23/2023    Procedure: GASTRIC SLEEVE LAPAROSCOPIC WITH DAVINCI ROBOT;  Surgeon: Solitario Luo MD;  Location: [facility];  Service: Robotics - DaVinci;  Laterality: N/A;   • HIP SURGERY Left 1986       Allergies:   Adhesive tape and Latex    Prior to Admission medications    Medication Sig Start Date End Date Taking? Authorizing Provider   buPROPion XL (WELLBUTRIN XL) 150 MG 24 hr tablet Take 1 tablet by mouth Every Morning. 12/5/22   Germán Raza MD   metoclopramide (Reglan) 5 MG tablet Take 1 tablet by mouth Every Morning. 4/25/23   Solitario Luo MD   NON FORMULARY Place 1 each on the skin as directed by provider Daily. Calcium patch (PatchAid)    Germán Raza MD   NON FORMULARY Place 1 each on the skin as directed by provider Daily. Vitamin B-12 patch (PatchAid)    Germán Raza MD   NON FORMULARY Place 1 each on the skin as directed by provider Daily. Multivitamin Patch (PatchAid)    Germán Raza MD   ondansetron ODT (ZOFRAN-ODT) 4 MG disintegrating tablet Place 1 tablet on the tongue Every 8 (Eight) Hours As Needed for Nausea or Vomiting.    Germán Raza MD   pantoprazole (PROTONIX) 40 MG EC tablet Take 1 tablet by mouth Daily. 4/10/23   Solitario Luo MD   potassium chloride (KLOR-CON) 20 MEQ CR tablet 2 tablets once daily x 10 days 4/25/23   Francie Cabrera, APRN      Current Facility-Administered Medications   Medication Dose Route Frequency Provider Last Rate Last Admin   • lactated ringers infusion  100 mL/hr Intravenous Continuous Solitario Luo  mL/hr at 04/26/23 0819 100 mL/hr at 04/26/23 0819   • ondansetron (ZOFRAN) injection 4 mg  4 mg Intravenous Q6H PRN Solitario Luo MD       • Pharmacy to Dose TPN   Does not apply Continuous PRN Solitario Luo MD       • potassium chloride (MICRO-K) CR capsule  "40 mEq  40 mEq Oral PRN Solitario Luo MD       • sodium chloride 0.9 % flush 10 mL  10 mL Intravenous Q12H Solitario Luo MD   10 mL at 04/26/23 0819   • sodium chloride 0.9 % flush 10 mL  10 mL Intravenous PRN Solitario Luo MD       • sodium chloride 0.9 % infusion 40 mL  40 mL Intravenous PRN Solitario Luo MD       • thiamine (B-1) injection 100 mg  100 mg Intramuscular Daily Solitario Luo MD   100 mg at 04/26/23 0039        Social History     Socioeconomic History   • Marital status:    Tobacco Use   • Smoking status: Never   • Smokeless tobacco: Never   Vaping Use   • Vaping Use: Never used   Substance and Sexual Activity   • Alcohol use: Never   • Drug use: Never   • Sexual activity: Defer       Family History:  family history includes Arthritis in his brother, father, maternal grandmother, mother, and paternal grandmother; Cancer in his maternal grandfather; Diabetes in his father; Hypertension in his father, maternal grandfather, maternal grandmother, mother, paternal grandfather, and paternal grandmother; Obesity in his brother, father, mother, paternal grandfather, and paternal grandmother; Stroke in his paternal grandfather.    Review of Systems:   The 12 point review of systems is otherwise unremarkable except for what is stated in the HPI.    Physical Examination:     Vital signs:   /62 (BP Location: Right arm, Patient Position: Lying)   Pulse 69   Temp 98 °F (36.7 °C) (Oral)   Resp 18   Ht 182.9 cm (72\")   Wt (!) 147 kg (324 lb 12.8 oz)   SpO2 98%   BMI 44.05 kg/m²     General Appearance:  This is a 47 y.o. year old male in no acute distress.  HEENT: Normocephalic, atraumatic, pupils equal, round, reactive to light, Oral cavity clear  Neck: No adenopathy, thyromegaly or mass.   Chest: Good expansion, clear to auscultation bilaterally    Heart: Regular rate and rhythm. No murmurs, gallops or rubs  Abdomen: Soft, non-distended. Normal bowel sounds present throughout. "  No tenderness, no guarding or rebound. No hepatosplenomegaly. No hernia.    Extremities: No edema.2+pulses peripherally  Skin: No rash or jaundice. No spider angiomata. No palmar erythema.  Neurologic:  Alert. Normal speech. Oriented. Nonfocal.  Psychiatric:  Normal affect.     Diagnostic Studies     Results from last 7 days   Lab Units 04/25/23  1531 04/20/23  0526   WBC 10*3/mm3 13.34* 6.64   HEMOGLOBIN g/dL 12.6* 12.1*   HEMATOCRIT % 38.8 37.9   PLATELETS 10*3/mm3 230 184     Results from last 7 days   Lab Units 04/26/23  0449 04/25/23  1531 04/20/23  1535 04/20/23  0526   SODIUM mmol/L 140 137  --  140   POTASSIUM mmol/L 3.2* 3.0* 3.4* 3.0*   CHLORIDE mmol/L 101 98  --  102   CO2 mmol/L 28.0 28.0  --  27.0   BUN mg/dL 11 11  --  13   CREATININE mg/dL 0.99 1.12  --  1.06   CALCIUM mg/dL 8.6 9.2  --  8.6   BILIRUBIN mg/dL 0.7 0.9  --  0.8   ALK PHOS U/L 70 85  --  76   ALT (SGPT) U/L 15 19  --  32   AST (SGOT) U/L 11 14  --  19   GLUCOSE mg/dL 107* 110*  --  116*         No results found for: LIPASE    STOOL OCCULT BLOOD  No results found for: OCCULTBLD     IMAGING:  XR Foot 3+ View Left    Result Date: 4/13/2023  Narrative: EXAMINATION: XR FOOT 3+ VW LEFT-  4/13/2023 12:20 PM CDT  HISTORY: Severe left foot pain.  Left foot, 3 views.  Moderate calcaneal spurring.  Dorsal midfoot spurring.  Intact metatarsals and toes.  Summary: 1. Degenerative spurring. 2. No acute bony abnormality.  This report was finalized on 04/13/2023 12:32 by Dr. Benji Alvarez MD.    US Gallbladder    Result Date: 4/17/2023  Narrative: US GALLBLADDER-4/17/2023 9:11 PM CDT  REASON FOR EXAM: Nause/vomiting   COMPARISON: NONE  TECHNIQUE: Multiple longitudinal and transverse realtime sonographic images of the right upper quadrant of the abdomen are obtained.  FINDINGS:  Pancreas: Poorly visualized on this exam.  Liver: Normal in size, echogenicity and echotexture. Portal veins are patent with hepatopedal flow. Limited evaluation in terms of  lesion assessment.  Gallbladder: Layering gallbladder sludge. Gallbladder is nondistended. Borderline abnormal wall thickening identified. No shadowing stones are seen. No pericholecystic fluid.  Bile ducts: The CBD measures 0.5 cm in diameter. There is no intrahepatic or extrahepatic ductal dilation.  Right kidney: Normal in size, shape and contour. No mass, hydronephrosis or calculi. No perinephric fluid collection.   Other: The visualized abdominal aorta is normal in caliber.      Impression: 1. No evidence of acute cholecystitis. 2. Layering gallbladder sludge with borderline abnormal wall thickening, perhaps chronic cholecystitis.   This report was finalized on 04/17/2023 21:47 by Dr Nabil Cohen, .    US Venous Doppler Lower Extremity Left (duplex)    Result Date: 4/13/2023  Narrative: History: Pain and swelling      Impression: Impression: There is no evidence of deep venous thrombosis or superficial thrombophlebitis of the left lower extremity.  Comments: Left lower extremity venous duplex exam was performed using color Doppler flow, Doppler wave form analysis, and grayscale imaging, with and without compression. There is no evidence of deep venous thrombosis of the common femoral, superficial femoral, popliteal, posterior tibial, and peroneal veins. There is no thrombus identified in the saphenofemoral junction or the greater saphenous vein.  This report was finalized on 04/13/2023 15:40 by Dr. Tolu Rios MD.    FL Upper GI Single Contrast    Result Date: 4/19/2023  Narrative: FL UPPER GI SINGLE-CONTRAST- 4/19/2023 9:26 AM CDT  HISTORY: Assessment of upper GI system due to chronic nausea and vomiting. Prior sleeve gastrectomy.  COMPARISON: 4/17/2023  FLUOROSCOPY RADIATION TIME: 1.34 minutes FLUOROSCOPY DOSE: 184 mGy NUMBER OF IMAGES: 43  Findings:  Patient drank thin barium out of a cup and fluoroscopic images were obtained.  Normal esophageal motility. No esophageal filling defect or stricture. GE  junction appears anatomically located. Contrast quickly flows through the GE junction into the stomach.  Contrast readily fills the stomach with postoperative change of sleeve gastrectomy noted. No gastric filling defect. No evidence of gastric contrast leak.  There is slight delayed emptying of the stomach but contrast does freely flow into the duodenum and into the jejunum.      Impression:  1.  No evidence of bowel obstruction or marked bowel luminal narrowing. No evidence of contrast leak. 2.  Contrast freely flows through the esophagus, into the stomach, and into the duodenum and jejunum (although there is slight delayed emptying of the stomach). This report was finalized on 04/19/2023 09:56 by Dr. Tom Gooden MD.    FL Upper GI Water Soluble    Result Date: 4/17/2023  Narrative: EXAMINATION: FL UPPER GI WATER SOLUBLE-  4/17/2023 9:49 AM CDT  HISTORY: GERD; K52.9-Noninfective gastroenteritis and colitis, unspecified; Z87.898-Personal history of other specified conditions; Z98.84-Bariatric surgery status  The fluoroscopy is performed and images of the upper changes are obtained during and after ingestion of water soluble contrast material/Gastrografin.  There is no previous study for comparison.  Patient is extremely nauseous and have a significant difficulty in initiating the swallowing with Gastrografin. Patient only able to take very small sips of the Gastrografin. The study, therefore, is of very limited diagnostic value.  No significant or obvious abnormality of the esophagus are noted. No obstruction or constriction. Normal peristalsis.  There is intermittently visualized small hiatal hernia. There is moderate spontaneous gastroesophageal reflux.  Postsurgical changes/deformity of the stomach is seen. The stomach is poorly evaluated due to lack of sufficient contrast in the stomach. No obvious leakage of the bolus. The mucosal pattern is not optimally visualized or evaluated.  The duodenal bulb is not  optimally opacified or evaluated. The duodenal loop is not opacified.      Impression: 1. Limited diagnostic study due to patient's inability to swallow adequate amount of contrast material for optimal evaluation. 2. The esophagus is grossly normal. No intrinsic abnormality. 3. An intermittently visualized small hiatal hernia and a moderate spontaneous gastroesophageal reflux. 4. Stomach is not optimally visualized or evaluated. No obvious or significant leakage/extravasation. The duodenum is not optimally opacified or evaluated. 5. Fluoroscopy time: 2 minutes 13 seconds. 6. The dose: 251mGy. 7. Number of images: 62 This report was finalized on 04/17/2023 11:01 by Dr. Liliana Dan MD.       ASSESSMENT/PLAN:   1. Recurrent nausea and vomiting  2. Post sleeve gastrectomy  3. Suspect delayed gastric emptying  4. Microlithiasis and borderline GB wall thickening  5. Acute diarrhea  a. His symptomatology is most consistent with delayed gastric emptying which could have been brought on by acute COVID infection exacerbating postsurgical gastroparesis.  Recommend CT abdomen pelvis with p.o. and IV contrast to rule out any other etiologies.  Recommend adjustment of medications including scheduled antiemetics and scheduled Reglan for now.  I think performing EGD has low yield.  After further discussion, patient and wife are in agreement that this is not necessary.  I do not think the symptoms are due to biliary etiology.  b. Patient only had diarrhea 2 occasions, once on Monday evening and another on Tuesday morning.  He has had no further episodes.  If diarrhea persist, will send full infectious work-up including GI PCR panel and C. difficile.      RECOMMENDATIONS:   1. Change Zofran to scheduled dosing 8 mg every 8 hours  2. Add Reglan 10 mg IV scheduled every 8 hours  3. Advance diet slowly  4. CT abdomen pelvis with p.o. and IV contrast  5. Change maintenance IV fluids to include potassium.    I will follow up tomorrow.   He may be transferred to [facility] per Dr. Luo.    Thank you for this referral. Please call with any questions.    Ramandeep Malone MD, DENISE MURCIA  09:19 CDT    DISCLAIMER:    This physician works through a [employer] as an inpatient consultant gastroenterologist only and has no outpatient clinic for patient follow up.  Any results not available at time of inpatient discharge and/or GI clinic follow up should be managed by the hospitalist team, PCP, or outpatient gastroenterologist.    Note to Patient:   The 21st Century Cures Act makes medical notes like this available to patients in the interest of transparency; however, please be advised this is a medical document.  It is intended as [id number] communication.  It is written in medical language and may contain abbreviations or verbiage that are unfamiliar.  It may appear blunt or direct.  Medical documents are intended to carry relevant information, facts as evident, and the clinical opinion of the practitioner.  This note may have been transcribed using a voice dictation system.  Voice-recognition errors may occur.  This should not be taken to alter the content or meaning of this note.

## 2023-04-26 NOTE — PROGRESS NOTES
"Adult Nutrition  Assessment/PES    Patient Name:  Santiago Mcgraw  YOB: 1975  MRN: [id number]  Admit Date:  4/25/2023    Assessment Date:  4/26/2023    Comments:  TPN recommendations     Reason for Assessment     Row Name 04/26/23 0928          Reason for Assessment    Reason For Assessment TF/PN     Diagnosis nutrition related history;other (see comments)  S/P sleeve gastrectomy, with continued nausea, vomiting and diarrhea     Identified At Risk by Screening Criteria no indicators present;reduced oral intake over the last month                Nutrition/Diet History     Row Name 04/26/23 0930          Nutrition/Diet History    Typical Intake (Food/Fluid/EN/PN) Pt is currently receiving a Bariatric Stage 5 diet with thin liquids     Factors Affecting Nutritional Intake nausea;other (see comments)  vomiting and diarrhea                Labs/Tests/Procedures/Meds     Row Name 04/26/23 0950          Labs/Procedures/Meds    Lab Results Reviewed reviewed     Lab Results Comments K+, gluc, alb, pre-alb        Diagnostic Tests/Procedures    Diagnostic Test/Procedure Reviewed reviewed        Medications    Pertinent Medications Reviewed reviewed     Pertinent Medications Comments see MAR                Physical Findings     Row Name 04/26/23 0955          Physical Findings    Overall Physical Appearance Ralph score-20, Last BM-4/25                Estimated/Assessed Needs - Anthropometrics     Row Name 04/26/23 0957          Anthropometrics    Height 182.9 cm (72\")     Weight 147 kg (324 lb 12.8 oz)     Age for Calculations 47     Height for Calculation 1.829 m (6')     Weight for Calculation 147 kg (324 lb 12.8 oz)        Estimated/Assessed Needs    Additional Documentation Additional Requirements (Group);Estimated Calorie Needs (Group);Fluid Requirements (Group);[place]-St. Jeor Equation (Group);KCAL/KG (Group);Protein Requirements (Group)        Estimated Calorie Needs    Estimated Calorie Need Method " kcal/kg        KCAL/KG    KCAL/KG 14 Kcal/Kg (kcal);15 Kcal/Kg (kcal)     14 Kcal/Kg (kcal) 2062.592     15 Kcal/Kg (kcal) 2209.92        [place]-St. Jeor Equation    RMR ([place]-St. Jeor Equation) 2381.28        Protein Requirements    Weight Used For Protein Calculations 77.6 kg (171 lb 1.2 oz)     Est Protein Requirement Amount (gms/kg) 1.6 gm protein     Estimated Protein Requirements (gms/day) 124.16        Fluid Requirements    Fluid Requirements (mL/day) 2200     RDA Method (mL) 2200                Nutrition Prescription Ordered     Row Name 04/26/23 1018          Nutrition Prescription PO    Current PO Diet Regular;Other (comment)  Bariatric Stage 5 with thin liquids     Fluid Consistency Thin        Nutrition Prescription PN    PN Route PICC     PN Goal Rate (mL/hr) 100 mL/hr     PN Current Rate (mL/hr) 25 mL/hr     PN Goal Volume (mL) 2400 mL     PN Current Volume (mL) 600 mL     Dextrose Concentration (%) 15 %     Dextrose (Kcal) 1224     Amino Acid Concentration (%) 5 %     Amino Acid (gm) 120     Lipid Concentration (%) 20%     Lipid Volume (mL) 250 mL     Lipid Frequency Daily                Evaluation of Received Nutrient/Fluid Intake     Row Name 04/26/23 1024          Fluid Intake Evaluation    Other Fluid (mL) 910  Total fluid intake from all sources since admission        PO Evaluation    Number of Meals 1     % PO Intake 100                   Problem/Interventions:   Problem 1     Row Name 04/26/23 1031          Nutrition Diagnoses Problem 1    Problem 1 Needs Alternate Route     Etiology (related to) Medical Diagnosis     Gastrointestinal Vomiting;Nausea;Diarrhea;Other (comment)  28 day s/p sleeve gastrectomy     Signs/Symptoms (evidenced by) PO diet not tolerated                      Intervention Goal     Row Name 04/26/23 1032          Intervention Goal    General Maintain nutrition;Nutrition support treatment;Reduce/improve symptoms;Meet nutritional needs for age/condition;Disease  management/therapy     PO Continue positive trend;Meet estimated needs     TF/PN Inititiate TF/PN     Weight Appropriate weight loss                Nutrition Intervention     Row Name 04/26/23 1033          Nutrition Intervention    RD/Tech Action Recommend/ordered  TPN recommendations made     Recommended/Ordered PN                Nutrition Prescription     Row Name 04/26/23 1033          Nutrition Prescription PN    Parenteral Prescription PN begin/change     PN Route PICC     Dextrose Concentration (%) 15 %     Dextrose (Kcal) 1224     Amino Acid Concentration (%) 5.0%     Amino Acid (gm) 120     PN Goal Rate (mL/hr) 100 mL/hr     PN Starting Rate (mL/hr) 25 mL/hr     PN Goal Volume (mL) 2400 mL     PN Starting Volume (mL) 600 mL     Lipid Concentration (%) 20%     Lipid Volume (mL) 250 mL     Lipid Frequency Daily     New PN Prescription Ordered? No, recommended        Other Orders    Other Recommend TPN to run @ 25 ml/hr for the first 24 hrs.  If tolerating after 24 hrs, may increase to goal rate.  Will put in a progress note and notify pharmacy of TPN recommendations                Education/Evaluation     Row Name 04/26/23 1035          Education    Education No discharge needs identified at this time        Monitor/Evaluation    Monitor Per protocol;PN delivery/tolerance;Weight;PO intake                 Electronically signed by:  Cortney Jane RD  04/26/23 10:43 CDT

## 2023-04-26 NOTE — PROGRESS NOTES
Patient Care Team:  Angelo Mckinnon APRN as PCP - General (Family Medicine)  Francie Cabrera APRN as Nurse Practitioner (Nurse Practitioner)    Subjective     Santiago Mcgraw is a 47 y.o. male.     Santiago is post op approximately 2 months from his sleeve gastrectomy procedure.  He still has intermittent nausea and vomiting.  He is currently on his stage IV diet.  He stated yesterday he woke up with vomiting.  He had then remained nauseated throughout the day today.  He was able to tolerate his stage IV diet on Saturday and Sunday which included ground turkey, solids and vegetables.  He does not know what exacerbates his nausea or vomiting.  He now is fraught with diarrhea.  He has been having recurrent gout symptoms as well occurring between the right and left foot.  Subsequent questioning he disclosed that he had pain in his left thigh on the medial aspect/location.        Review Of Systems:  General ROS: positive for  - fatigue  Respiratory ROS: no cough, shortness of breath, or wheezing  Cardiovascular ROS: no chest pain or dyspnea on exertion  Gastrointestinal ROS: no abdominal pain, change in bowel habits, or black or bloody stools  Positive for nausea and vomiting  Musculoskeletal ROS: positive for - muscle pain    The following portions of the patient's history were reviewed and updated as appropriate: allergies, current medications, past family history, past medical history, past social history, past surgical history and problem list.    Objective   There were no vitals taken for this visit.  There were no vitals filed for this visit.     General Appearance:  awake, alert, oriented, in no acute distress  Lungs:  Normal expansion.  Clear to auscultation.  No rales, rhonchi, or wheezing.  Heart:  Heart regular rate and rhythm  Abdomen:  Soft, non-tender, normal bowel sounds; no bruits, organomegaly or masses.  Abnormal shape: obese  Extremities: Right thigh tenderness in the medial aspect no  gross swelling however there is pain presumably secondary to his gout in the left foot with some swelling.    ASSESSMENT/ PLAN    Encounter Diagnoses   Name Primary?   • Nausea and vomiting, unspecified vomiting type Yes   • Status post laparoscopic sleeve gastrectomy      The patient's intermittent nausea and vomiting is of a concern and I have explained to the patient that I will be obtaining a second opinion.  He does have a history of Micrell cholelithiasis however his symptoms beyond the nausea and intermittent vomiting are not consistent with acute cholecystitis.  He also has obtained labs per my recommendation.  He complained of numbness in his right lower quadrant location consistent with his incisional site.  My concern is this is more than likely due to the surgical site however I would want to rule out vitamin depletion or deficiency due to his poor intake.  Of note the patient has been admittedly utilizing vitamin patches consistently but I will reassess this as well.  I will obtain a CBC and CMP.  The CBC has showed mild leukocytosis.  The patient does not complain of abdominal pain so I do not believe it is related to his gallbladder and probably more related to his gout which has reflared up again.   He is currently stable and will await a second opinion.  If his labs return with signs consistent with elevation of his LFTs then this would prompt more of a recent of the cause of his symptoms and I would be more confident in removing his gallbladder as the etiology/offending agent of his nausea.      04/25/23  21:14 CDT  Patient Care Team:  Angelo Mckinnon APRN as PCP - General (Family Medicine)  Francie Cabrera APRN as Nurse Practitioner (Nurse Practitioner)  Solitario Luo MD FACS

## 2023-04-26 NOTE — PROGRESS NOTES
Patient Care Team:  Angelo Mckinnon APRN as PCP - General (Family Medicine)  Francie Cabrera APRN as Nurse Practitioner (Nurse Practitioner)    Subjective     Santiago Mcgraw is POD since february months ago from a laparoscopic sleeve gastrectomy procedure.  The patient approximately 1-1/2 months after his surgery began having issues with nausea and vomiting.  He also presented with fevers and chills per his history.  Since then he has been in and out of our hospital facility for hydration, potassium replacement, nausea control and monitoring.  Last admission he had an ultrasound which showed sludge in the gallbladder without cholecystitis.  Remainder of his LFTs progressively improved.  Throughout this time he also had 2 attacks of gout.  His most recent flareup is his left foot which is currently active and symptomatic.  His last admission was approximately 1 week ago and was tolerating p.o. intake however yesterday a.m. he admitted he woke up with significant vomiting and nausea, he also now complains of uncontrolled diarrhea.  This persisted again which prompted laboratory work and.  Fluids.  At this point there is concern of nutritional depletion primarily protein related due to poor oral and consistent intake of foods.  He was admitted given thiamine and B12 as well as IV fluids and potassium replacement.  He has tolerated oral intake today minimal amounts due to it not tasting good.  Minimal nausea per her statement at this time.       Review of Systems:     Review of Systems - General ROS: positive for  - fatigue and weight loss  Respiratory ROS: no cough, shortness of breath, or wheezing  Cardiovascular ROS: no chest pain or dyspnea on exertion  Gastrointestinal ROS: no abdominal pain, change in bowel habits, or black or bloody stools  Musculoskeletal ROS: positive for - gait disturbance and pain in foot - left    Objective     Vital Signs  /62 (BP Location: Right arm, Patient Position:  "Lying)   Pulse 69   Temp 98 °F (36.7 °C) (Oral)   Resp 18   Ht 182.9 cm (72\")   Wt (!) 147 kg (324 lb 12.8 oz)   SpO2 98%   BMI 44.05 kg/m²     Physical Exam:    HEENT: extra ocular movement intact and sclera clear, anicteric  Respiratory: appears well, vitals normal, no respiratory distress, acyanotic, normal RR, chest clear, no wheezing, crepitations, rhonchi, normal symmetric air entry  Cardiovascular: Regular rate and rhythm, S1, S2 normal, no murmur, click, rub or gallop  GI: Soft, non-tender, normal bowel sounds; no bruits, organomegaly or masses.  Abnormal shape: obese  Musculoskeletal: Left foot pain and gait disturbance due to not being able to walk on that foot  Neurologic: alert, oriented, normal speech, no focal findings or movement disorder noted     Results Review:    Labs reviewed    Medication Reviewed:   Current Facility-Administered Medications   Medication Dose Route Frequency Provider Last Rate Last Admin   • lactated ringers infusion  100 mL/hr Intravenous Continuous Solitario Luo  mL/hr at 04/26/23 0819 100 mL/hr at 04/26/23 0819   • ondansetron (ZOFRAN) injection 4 mg  4 mg Intravenous Q6H PRN Solitario Luo MD       • Pharmacy to Dose TPN   Does not apply Continuous PRN Solitario Luo MD       • potassium chloride (MICRO-K) CR capsule 40 mEq  40 mEq Oral PRN Solitario Luo MD       • sodium chloride 0.9 % flush 10 mL  10 mL Intravenous Q12H Solitario Luo MD   10 mL at 04/26/23 0819   • sodium chloride 0.9 % flush 10 mL  10 mL Intravenous PRN Solitario Luo MD       • sodium chloride 0.9 % infusion 40 mL  40 mL Intravenous PRN Solitario Luo MD       • thiamine (B-1) injection 100 mg  100 mg Intramuscular Daily Solitario Luo MD   100 mg at 04/26/23 0039       Assessment & Plan  POD 27 from the above surgery now with intermittent nausea and vomiting symptoms, dehydration, concerns of malnutrition.  Patient is stable however we had concerns of this chronic " episode of intermittent nausea and vomiting.  This a.m. the patient is tolerating p.o. without difficulty.  He did receive last night thiamine and B12 shot.  Labs have been ordered.  He tolerated the potassium oral supplementation/replacement.  He still has difficulties ambulating due to his gout in his left foot.  Family is requesting a second opinion which I agree with.  I have contacted [facility] for possible patient transfer which is pending.  Talk to the transfer unit and awaiting return phone call.  In the meantime the patient will continue with our therapy which we will have gastroenterology consultation, PICC line placement and nutritional supplementation.  Patient is comfortable with this regimen and we continued care until transfer is possible.  Awaiting to discuss case with gastroenterology consultation provider.  Had the opportunity to talk with gastroenterologist Dr. Malone she will assess the patient as well and provide additional information if possible.      Solitario Luo MD  04/26/23  09:56 CDT

## 2023-04-27 LAB
ALBUMIN SERPL-MCNC: 2.9 G/DL (ref 3.5–5.2)
ALBUMIN/GLOB SERPL: 1.2 G/DL
ALP SERPL-CCNC: 62 U/L (ref 39–117)
ALT SERPL W P-5'-P-CCNC: 14 U/L (ref 1–41)
ANION GAP SERPL CALCULATED.3IONS-SCNC: 9 MMOL/L (ref 5–15)
AST SERPL-CCNC: 7 U/L (ref 1–40)
BASOPHILS # BLD AUTO: 0.03 10*3/MM3 (ref 0–0.2)
BASOPHILS NFR BLD AUTO: 0.4 % (ref 0–1.5)
BILIRUB SERPL-MCNC: 0.4 MG/DL (ref 0–1.2)
BUN SERPL-MCNC: 9 MG/DL (ref 6–20)
BUN/CREAT SERPL: 9.2 (ref 7–25)
CALCIUM SPEC-SCNC: 8.3 MG/DL (ref 8.6–10.5)
CHLORIDE SERPL-SCNC: 105 MMOL/L (ref 98–107)
CO2 SERPL-SCNC: 25 MMOL/L (ref 22–29)
COPPER SERPL-MCNC: 96 UG/DL (ref 69–132)
CREAT SERPL-MCNC: 0.98 MG/DL (ref 0.76–1.27)
DEPRECATED RDW RBC AUTO: 49 FL (ref 37–54)
DEPRECATED RDW RBC AUTO: 49.7 FL (ref 37–54)
EGFRCR SERPLBLD CKD-EPI 2021: 95.1 ML/MIN/1.73
EOSINOPHIL # BLD AUTO: 0.28 10*3/MM3 (ref 0–0.4)
EOSINOPHIL NFR BLD AUTO: 3.6 % (ref 0.3–6.2)
ERYTHROCYTE [DISTWIDTH] IN BLOOD BY AUTOMATED COUNT: 16.1 % (ref 12.3–15.4)
ERYTHROCYTE [DISTWIDTH] IN BLOOD BY AUTOMATED COUNT: 16.3 % (ref 12.3–15.4)
GLOBULIN UR ELPH-MCNC: 2.5 GM/DL
GLUCOSE SERPL-MCNC: 119 MG/DL (ref 65–99)
HCT VFR BLD AUTO: 32.2 % (ref 37.5–51)
HCT VFR BLD AUTO: 33.5 % (ref 37.5–51)
HGB BLD-MCNC: 10.2 G/DL (ref 13–17.7)
HGB BLD-MCNC: 10.7 G/DL (ref 13–17.7)
IMM GRANULOCYTES # BLD AUTO: 0.05 10*3/MM3 (ref 0–0.05)
IMM GRANULOCYTES NFR BLD AUTO: 0.6 % (ref 0–0.5)
LYMPHOCYTES # BLD AUTO: 1.38 10*3/MM3 (ref 0.7–3.1)
LYMPHOCYTES NFR BLD AUTO: 17.7 % (ref 19.6–45.3)
MCH RBC QN AUTO: 26.7 PG (ref 26.6–33)
MCH RBC QN AUTO: 27.1 PG (ref 26.6–33)
MCHC RBC AUTO-ENTMCNC: 31.7 G/DL (ref 31.5–35.7)
MCHC RBC AUTO-ENTMCNC: 31.9 G/DL (ref 31.5–35.7)
MCV RBC AUTO: 84.3 FL (ref 79–97)
MCV RBC AUTO: 84.8 FL (ref 79–97)
MONOCYTES # BLD AUTO: 0.81 10*3/MM3 (ref 0.1–0.9)
MONOCYTES NFR BLD AUTO: 10.4 % (ref 5–12)
NEUTROPHILS NFR BLD AUTO: 5.23 10*3/MM3 (ref 1.7–7)
NEUTROPHILS NFR BLD AUTO: 67.3 % (ref 42.7–76)
NRBC BLD AUTO-RTO: 0 /100 WBC (ref 0–0.2)
PLATELET # BLD AUTO: 185 10*3/MM3 (ref 140–450)
PLATELET # BLD AUTO: 205 10*3/MM3 (ref 140–450)
PMV BLD AUTO: 10.6 FL (ref 6–12)
PMV BLD AUTO: 10.7 FL (ref 6–12)
POTASSIUM SERPL-SCNC: 4 MMOL/L (ref 3.5–5.2)
PREALB SERPL-MCNC: 9.3 MG/DL (ref 20–40)
PROT SERPL-MCNC: 5.4 G/DL (ref 6–8.5)
RBC # BLD AUTO: 3.82 10*6/MM3 (ref 4.14–5.8)
RBC # BLD AUTO: 3.95 10*6/MM3 (ref 4.14–5.8)
SODIUM SERPL-SCNC: 139 MMOL/L (ref 136–145)
WBC NRBC COR # BLD: 7.13 10*3/MM3 (ref 3.4–10.8)
WBC NRBC COR # BLD: 7.78 10*3/MM3 (ref 3.4–10.8)

## 2023-04-27 PROCEDURE — 96361 HYDRATE IV INFUSION ADD-ON: CPT

## 2023-04-27 PROCEDURE — 63710000001 ONDANSETRON PER 8 MG: Performed by: INTERNAL MEDICINE

## 2023-04-27 PROCEDURE — 85027 COMPLETE CBC AUTOMATED: CPT | Performed by: SURGERY

## 2023-04-27 PROCEDURE — 25010000002 METOCLOPRAMIDE PER 10 MG: Performed by: INTERNAL MEDICINE

## 2023-04-27 PROCEDURE — 85025 COMPLETE CBC W/AUTO DIFF WBC: CPT | Performed by: SURGERY

## 2023-04-27 PROCEDURE — 96372 THER/PROPH/DIAG INJ SC/IM: CPT

## 2023-04-27 PROCEDURE — G0378 HOSPITAL OBSERVATION PER HR: HCPCS

## 2023-04-27 PROCEDURE — 96376 TX/PRO/DX INJ SAME DRUG ADON: CPT

## 2023-04-27 PROCEDURE — 25010000002 THIAMINE PER 100 MG: Performed by: SURGERY

## 2023-04-27 PROCEDURE — 99232 SBSQ HOSP IP/OBS MODERATE 35: CPT | Performed by: INTERNAL MEDICINE

## 2023-04-27 PROCEDURE — 96366 THER/PROPH/DIAG IV INF ADDON: CPT

## 2023-04-27 PROCEDURE — 96368 THER/DIAG CONCURRENT INF: CPT

## 2023-04-27 PROCEDURE — 80053 COMPREHEN METABOLIC PANEL: CPT | Performed by: SURGERY

## 2023-04-27 PROCEDURE — 25010000002 ONDANSETRON PER 1 MG: Performed by: INTERNAL MEDICINE

## 2023-04-27 PROCEDURE — 84134 ASSAY OF PREALBUMIN: CPT | Performed by: SURGERY

## 2023-04-27 RX ORDER — METOCLOPRAMIDE 10 MG/1
10 TABLET ORAL
Status: DISCONTINUED | OUTPATIENT
Start: 2023-04-27 | End: 2023-05-01 | Stop reason: HOSPADM

## 2023-04-27 RX ORDER — AZITHROMYCIN 250 MG/1
250 TABLET, FILM COATED ORAL DAILY
Status: DISCONTINUED | OUTPATIENT
Start: 2023-04-28 | End: 2023-04-27

## 2023-04-27 RX ORDER — ONDANSETRON 8 MG/1
8 TABLET, ORALLY DISINTEGRATING ORAL EVERY 8 HOURS SCHEDULED
Status: DISCONTINUED | OUTPATIENT
Start: 2023-04-27 | End: 2023-05-01 | Stop reason: HOSPADM

## 2023-04-27 RX ORDER — AZITHROMYCIN 250 MG/1
500 TABLET, FILM COATED ORAL DAILY
Status: DISCONTINUED | OUTPATIENT
Start: 2023-04-27 | End: 2023-04-27

## 2023-04-27 RX ORDER — AMOXICILLIN AND CLAVULANATE POTASSIUM 875; 125 MG/1; MG/1
1 TABLET, FILM COATED ORAL EVERY 12 HOURS SCHEDULED
Status: DISCONTINUED | OUTPATIENT
Start: 2023-04-27 | End: 2023-05-01 | Stop reason: HOSPADM

## 2023-04-27 RX ADMIN — POTASSIUM CHLORIDE, DEXTROSE MONOHYDRATE AND SODIUM CHLORIDE 125 ML/HR: 300; 5; 900 INJECTION, SOLUTION INTRAVENOUS at 06:31

## 2023-04-27 RX ADMIN — METOCLOPRAMIDE HYDROCHLORIDE 10 MG: 5 INJECTION INTRAMUSCULAR; INTRAVENOUS at 04:08

## 2023-04-27 RX ADMIN — ONDANSETRON 8 MG: 8 TABLET, ORALLY DISINTEGRATING ORAL at 21:00

## 2023-04-27 RX ADMIN — ONDANSETRON HYDROCHLORIDE 8 MG: 2 INJECTION INTRAMUSCULAR; INTRAVENOUS at 04:08

## 2023-04-27 RX ADMIN — Medication 10 ML: at 08:00

## 2023-04-27 RX ADMIN — LEUCINE, PHENYLALANINE, LYSINE, METHIONINE, ISOLEUCINE, VALINE, HISTIDINE, THREONINE, TRYPTOPHAN, ALANINE, GLYCINE, ARGININE, PROLINE, SERINE, TYROSINE, SODIUM ACETATE, DIBASIC POTASSIUM PHOSPHATE, MAGNESIUM CHLORIDE, SODIUM CHLORIDE, CALCIUM CHLORIDE, DEXTROSE
365; 280; 290; 200; 300; 290; 240; 210; 90; 1035; 515; 575; 340; 250; 20; 340; 261; 51; 59; 33; 15 INJECTION INTRAVENOUS at 17:28

## 2023-04-27 RX ADMIN — AMOXICILLIN AND CLAVULANATE POTASSIUM 1 TABLET: 875; 125 TABLET, FILM COATED ORAL at 11:17

## 2023-04-27 RX ADMIN — THIAMINE HYDROCHLORIDE 50 MG: 100 INJECTION, SOLUTION INTRAMUSCULAR; INTRAVENOUS at 08:00

## 2023-04-27 RX ADMIN — THIAMINE HYDROCHLORIDE 50 MG: 100 INJECTION, SOLUTION INTRAMUSCULAR; INTRAVENOUS at 20:52

## 2023-04-27 RX ADMIN — AMOXICILLIN AND CLAVULANATE POTASSIUM 1 TABLET: 875; 125 TABLET, FILM COATED ORAL at 20:52

## 2023-04-27 RX ADMIN — METOCLOPRAMIDE 10 MG: 10 TABLET ORAL at 11:17

## 2023-04-27 RX ADMIN — POTASSIUM CHLORIDE, DEXTROSE MONOHYDRATE AND SODIUM CHLORIDE 75 ML/HR: 300; 5; 900 INJECTION, SOLUTION INTRAVENOUS at 17:29

## 2023-04-27 RX ADMIN — ONDANSETRON 8 MG: 8 TABLET, ORALLY DISINTEGRATING ORAL at 13:20

## 2023-04-27 RX ADMIN — Medication 10 ML: at 20:52

## 2023-04-27 RX ADMIN — I.V. FAT EMULSION 50 G: 20 EMULSION INTRAVENOUS at 17:28

## 2023-04-27 RX ADMIN — METOCLOPRAMIDE 10 MG: 10 TABLET ORAL at 17:30

## 2023-04-27 NOTE — PROGRESS NOTES
"Pharmacy Parenteral Nutrition Daily Evaluation    Santiago Mcgraw is a  47 y.o. male that pharmacy has been consulted for TPN for post-op laparoscopic sleeve gastrectomy with continued nausea/vomiting, concern for nutritional depletion of protein per Dr. Luo' request.  [Ht: 182.9 cm (72\"); Wt: (!) 149 kg (327 lb 6.4 oz)]  Admission Date: 425      Subjective:  Progress notes reviewed.    Objective:  Temp (24hrs), Av.2 °F (36.8 °C), Min:98 °F (36.7 °C), Max:98.5 °F (36.9 °C)    Vent Settings       Results from last 7 days   Lab Units 23  0813 23  0449 23  2130 23  1531   SODIUM mmol/L 139 140  --  137   POTASSIUM mmol/L 4.0 3.2*  --  3.0*   CHLORIDE mmol/L 105 101  --  98   MAGNESIUM mg/dL  --  1.8 1.8  --    PHOSPHORUS mg/dL  --  3.6  --   --    CO2 mmol/L 25.0 28.0  --  28.0   TOTAL PROTEIN g/dL 5.4* 5.8*  --  6.9   BUN mg/dL 9 11  --  11   CREATININE mg/dL 0.98 0.99  --  1.12   CALCIUM mg/dL 8.3* 8.6  --  9.2   ALBUMIN g/dL 2.9* 3.2*  --  3.7   BILIRUBIN mg/dL 0.4 0.7  --  0.9   ALK PHOS U/L 62 70  --  85   ALT (SGPT) U/L 14 15  --  19   AST (SGOT) U/L 7 11  --  14   GLUCOSE mg/dL 119* 107*  --  110*       No results found for: TRIG    Corrected Ca (Calcium + (0.8 * (4-albumin)) = 9.2    Results from last 7 days   Lab Units 23  0813 23  0602 23  1531   WBC 10*3/mm3 7.13 7.78 13.34*   HEMOGLOBIN g/dL 10.2* 10.7* 12.6*   HEMATOCRIT % 32.2* 33.5* 38.8   PLATELETS 10*3/mm3 185 205 230       estimated creatinine clearance is 138.2 mL/min (by C-G formula based on SCr of 0.98 mg/dL).    Intake & Output (last 3 days)           P.O.  550 600 240    I.V. (mL/kg)  800 (5.4) 3488.1 (23.4)     Total Intake(mL/kg)  1350 (9.2) 4088.1 (27.4) 240 (1.6)    Net  +1350 +4088.1 +240            Urine Unmeasured Occurrence   2 x     Stool Unmeasured Occurrence   1 x           Above labs reviewed. "     Dietitian Recommendations  Diet Orders (active) (From admission, onward)     Start     Ordered    23 1600  DIET MESSAGE Please send Orange Juice on every tray (TID) and one now. HILDA Jimenes  3 Times Daily      Comments: Please send Orange Juice on every tray (TID) and one now. HILDA Jimenes    23 1026    23 1830  Diet: Gastrointestinal Diets; Fat-Restricted, High Fiber, Low Irritant, Lactose-Controlled; Bariatric Stage 5, No Straw; Texture: Regular Texture (IDDSI 7); Fluid Consistency: Thin (IDDSI 0)  Diet Effective Now         23 1830                Current TPN Regimen Recommendation:  Dextrose 15% / Amino Acid 5% at goal rate of 100 mL/hr.  20% Lipid Emulsion 250 mL every 24 hours.    Daily Assessment:  TPN Therapy Day 2    TPN Medication Recent History (Show up to 4 orders; newest on the left.)     Start date and time    2023 1800      Adult Standard [facility] TPN [[id number]]    Order Status  Last Dose in Progress    Last Admin  Currently Infusing at 2023 0650 by Aliza Melgar, RN    Frequency  Q24H (TPN)       Additives    multivitamin (adult)  10 mL    Trace Minerals Cu-Mn-Se-Zn  1 mL       Amino Acids in Dextrose Premix    amino acids 5% - electrolytes in dextrose 15%  2,000 mL       Energy Contribution    Proteins  400 kcal    Dextrose  1,020 kcal    Lipids  --    Total  1,420 kcal       Electrolyte Ion Calculated Amount    Sodium  70 mEq    Potassium  60 mEq    Calcium  9 mEq    Magnesium  10 mEq    Aluminum  --    Phosphate  30 mmol    Chloride  78 mEq    Acetate  160 mEq    Chloride: Acetate Ratio  0.49       Other    Total Amino Acid  100 g    Total Amino Acid/kg  0.68 g/kg    Glucose Infusion Rate  0.42 mg/kg/min    Osmolarity  1,391.35    Volume  2,011 mL    Rate  25 mL/hr    Dosing Weight  147 kg    Infusion Site  [facility]           Additional insulin administration while previous TPN infusin units  Additional electrolyte administration while  previous TPN infusing: D5NS with 40 mEq/L at 75ml/hr    Plan:  1. TPN Formula:     Clinimix 5/15 E (2400 mL/day)   Lipids: 250 mL/day over 12 hours   Volume: 2400 mL (100 mL/hr over 24 hrs daily)    Based on the above labs, will add the following electrolytes/additives to the TPN only on MWF                             Multivitamin 10 mL              Trace Elements 1 mL    2. TPN will provide:   Lipids: 500 kcal   Protein: 120 g = 480 kcal  Dextrose: 360 g =  1224 kcal   Total: 2204 kcal    3. Electrolyte replacements can be ordered as needed.   4. Pharmacy will continue to follow closely and make adjustments to TPN as necessary.    Tom García, PharmD  4/27/2023  11:58 CDT

## 2023-04-27 NOTE — PLAN OF CARE
Goal Outcome Evaluation:  Plan of Care Reviewed With: patient        Progress: no change       Pt with no complaints of pain so far during shift. IVF, TPN, and Lipids infusing through PICC line. Scheduled Reglan and Zofran given per order. Up ad terrence. Voiding. Stage 5 bariatric diet. SCDs on. VSS. Safety maintained.

## 2023-04-27 NOTE — DISCHARGE PLACEMENT REQUEST
"Santiago Hough (47 y.o. Male)     Date of Birth   1975    Social Security Number       Address   [address] [place] [address]    Home Phone   [phone]    MRN   [id number]       [facility]    Marital Status                               Admission Date   23    Admission Type   Urgent    Admitting Provider   Solitario Luo MD    Attending Provider   Solitario Luo MD    Department, Room/Bed   [address], [facility]/       Discharge Date       Discharge Disposition       Discharge Destination                               Attending Provider: Solitario Luo MD    Allergies: Adhesive Tape, Latex    Isolation: None   Infection: None   Code Status: CPR    Ht: 182.9 cm (72\")   Wt: 149 kg (327 lb 6.4 oz)    Admission Cmt: None   Principal Problem: Nausea & vomiting [R11.2]                 Active Insurance as of 2023     Primary Coverage     Payor Plan Insurance Group Employer/Plan Group    [employer] Formerly [facility] EMPLOYEE [id number]     Payor Plan Address Payor Plan Phone Number Payor Plan Fax Number Effective Dates    [address] [phone]  2015 - None Entered    [place] [address]       Subscriber Name Subscriber Birth Date Member ID       CARLOS HOUGH 1977 [id number]                 Emergency Contacts      (Rel.) Home Phone Work Phone Mobile Phone    CARLOS HOUGH (Spouse) [phone] -- --        [address] [address]  Phone:  [phone]  Fax:          Patient: ROOM: [facility]   Santiago Hough MRN:  [id number]   [address] [place] [id number] :  1975  SSN:    Phone: [phone] Sex:  M   PCP: Angelo Mckinnon                Emergency Contact Information      Name Relation Home Work Mobile     CARLOS HOUGH Spouse [phone]              INSURANCE PAYOR PLAN GROUP # SUBSCRIBER ID   Primary:    DEIDRA ARGUETA [id number] " [id number]   Admitting Diagnosis: Dehydration [E86.0]  Order Date:  Apr 27, 2023        Case Management  Consult       (Order ID: [id number])     Diagnosis:         Priority:  Routine Expected Date:   Expiration Date:        Interval:  Once Count:    Is discharge planning needed? If yes, who is requesting discharge planning? Provider  Reason for Consult: Home TPN recommendations: Dextrose 15%, Amino Acids 5% at 65mL/hour with 20% lipid emulsion, 250mL, daily        Verbal Order Mode: Telephone with readback   Authorizing Provider: Solitario Luo MD  Authorizing Provider's NPI: [id number]     Order Entered By: Kianna Jeong RN 4/27/2023  3:41 PM     Electronically signed by:       Nolvia Mckeon RDN LD   Registered Dietitian  Nutrition  Consults     Addendum  Date of Service:  04/27/23 1411  Creation Time:  04/27/23 1411  Consult Orders   Nutrition Consult [[id number]] ordered by Solitario Luo MD at 04/27/23 1200            Adult Nutrition  Assessment/PES     Patient Name:  Santiago Mcgraw  YOB: 1975  MRN: [id number]  Admit Date:  4/25/2023     Assessment Date:  4/27/2023     Comments:  Consult received for recommendations for home TPN. Pt is currently receiving a bariatric stage 5 diet. He has consumed avg of 92% of the last 3 meals. He was started on TPN 4/26 d/t being at risk of malnutrition r/t persistent N/V/D. He is s/p sleeve gastrectomy 2/23/23. TPN of D15% Aa5% with lipids daily was initiated at 25mL/hour for the first 24 hours. This evening, TPN rate will increase to 100mL/hour.     Since pt has been able to tolerate the current bariatric stage 5 diet and has consumed 92% of the last 3 meals, will recommend a lower TPN rate for home or PPN to meet protein needs.     Home PPN recommendations: Dextrose 5%, Amino acids 4.25% at 75mL/hour.  or  Home TPN recommendations: Dextrose 15%, Amino Acids 5% at 65mL/hour with 20% lipid emulsion, 250mL, daily.     PPN  will provide 612 kcal and 77 grams of protein daily. This will meet 29% of est'd kcal needs and 73% of est'd protein needs.      TPN will provide 1608 kcal and 78 grams of protein daily. This will meet 77% of est'd kcal needs and 74% of est'd protein needs to supplement po intake.                 Reason for Assessment      Row Name 04/27/23 1346                 Reason for Assessment     Reason For Assessment follow-up protocol  New consult for home TPN assessment       Diagnosis surgery/postoperative complications  s/p sleeve gastrectomy with N/V/D                           Nutrition/Diet History      Row Name 04/27/23 1348                 Nutrition/Diet History     Typical Intake (Food/Fluid/EN/PN) Pt is currently ordered a GI, fat restricted, high fiber, low irritant, lactose controlled, bariatric stage 5 diet. Pt also ordered TPN d/t at risk for malnutrition, specifically protein.       Factors Affecting Nutritional Intake nausea;altered gastrointestinal function                           Labs/Tests/Procedures/Meds      Row Name 04/27/23 1354                 Labs/Procedures/Meds     Lab Results Reviewed reviewed, pertinent       Lab Results Comments H/H, alb 2.9, PAB 16.5              Diagnostic Tests/Procedures     Diagnostic Test/Procedure Reviewed reviewed, pertinent       Diagnostic Test/Procedures Comments s/p lap sleeve gastrectomy 2/23/23              Medications     Pertinent Medications Reviewed reviewed, pertinent       Pertinent Medications Comments augmentin, reglan, thiamine, zofran, reglan                              Estimated/Assessed Needs - Anthropometrics      Row Name 04/27/23 1356                 Anthropometrics     Weight for Calculation 149 kg (328 lb 7.8 oz)              Estimated/Assessed Needs     Additional Documentation KCAL/KG (Group)              KCAL/KG     KCAL/KG 14 Kcal/Kg (kcal)       14 Kcal/Kg (kcal) 2086              Protein Requirements     Weight Used For Protein  Calculations 80.7 kg (178 lb)  IBW       Est Protein Requirement Amount (gms/kg) 1.3 gm protein       Estimated Protein Requirements (gms/day) 104.96              Fluid Requirements     Fluid Requirements (mL/day) 2086       Estimated Fluid Requirement Method other (see comments)  1mL/kcal       RDA Method (mL) 2086                           Nutrition Prescription Ordered      Row Name 04/27/23 1359                 Nutrition Prescription PO     Current PO Diet Bariatric  bariatric stage 5       Common Modifiers GI Soft/[place]  fat restricted, low irritant, lactose controlled, no straw              Nutrition Prescription PN     PN Route PICC       PN Goal Rate (mL/hr) 100 mL/hr       PN Current Rate (mL/hr) 25 mL/hr       PN Goal Volume (mL) 2400 mL       PN Current Volume (mL) 600 mL       Dextrose Concentration (%) 15 %       Dextrose (Kcal) 1224       Amino Acid Concentration (%) 5 %       Amino Acid (gm) 120       Lipid Concentration (%) 20%       Lipid Volume (mL) 250 mL       Lipid Frequency Daily                           Evaluation of Received Nutrient/Fluid Intake      Row Name 04/27/23 1401                 Nutrient/Fluid Evaluation     Number of Days Evaluated 2 days       Additional Documentation Fluid Intake Evaluation (Group)              Fluid Intake Evaluation     Oral Fluid (mL) 575       Other Fluid (mL) 2144  IVF's              PO Evaluation     Number of Meals 3       % PO Intake 92              PN Evaluation     Number of Days PN Evaluated Other (comment)  TPN started at 1800 on 4/26, no I/O recorded for TPN intake                         Problem/Interventions:             Problem 2      Row Name 04/27/23 1403                 Nutrition Diagnoses Problem 2     Problem 2 Altered GI Function       Etiology (related to) Medical Diagnosis       Gastrointestinal Nausea;Vomiting;Diarrhea;Other (comment)  sleeve gastrectomy 2/23/23       Signs/Symptoms (evidenced by) Report of Minimal PO Intake;Unintended  Weight Change       Unintended Weight Change Loss       Number of Pounds Lost 88#       Weight loss time period 3 months                                 Intervention Goal      Row Name 04/27/23 1405                 Intervention Goal     General Reduce/improve symptoms;Meet nutritional needs for age/condition;Disease management/therapy;Nutrition support treatment       PO Tolerate PO       TF/PN Adjust TF/PN  for home TPN       Weight Appropriate weight loss                           Nutrition Intervention      Row Name 04/27/23 1405                 Nutrition Intervention     RD/Tech Action Recommend/ordered;Follow Tx progress;Care plan reviewd       Recommended/Ordered PN                           Nutrition Prescription      Row Name 04/27/23 1405                 Nutrition Prescription PN     Parenteral Prescription PN begin/change;Parenteral to supply  Recommendations for home TPN       PN Route PICC       Dextrose Concentration (%) 15 %       Dextrose (Kcal) 796       Amino Acid Concentration (%) 5.0%       Amino Acid (gm) 78       PN Goal Rate (mL/hr) 65 mL/hr       PN Starting Rate (mL/hr) 65 mL/hr       PN Goal Volume (mL) 1560 mL       PN Starting Volume (mL) 1560 mL       Lipid Concentration (%) 20%       Lipid Volume (mL) 250 mL       Lipid Frequency Daily       New PN Prescription Ordered? No, recommended              PN to Supply     NPC/Day 1296 NPC/day       Kcal/Day 1608 Kcal/day       Kcal/Kg 11 Kcal/kg       Kcal/Kg Weight Method Actual weight       Protein (gm/day) 78 gm/day       Meet Estimated Kcal Need (%) 77 %       Meet Estimated Protein Need (%) 74 %                           Education/Evaluation      Row Name 04/27/23 6993                 Education     Education Other (comment)  TPN will have to be approved by insurance for home coverage              Monitor/Evaluation     Monitor Per protocol;I&O;PO intake;Pertinent labs;PN delivery/tolerance;Weight;Symptoms                     "  Electronically signed by:  Nolvia Mckeon RDN, LD  04/27/23 14:11 CDT        Revision History                              Solitario Luo MD   Physician  Surgery  Progress Notes     Signed  Date of Service:  04/27/23 1458  Creation Time:  04/27/23 1458     Signed        Expand All Collapse All[]Expand All by Default                                                                                                                                                                                                                                                                                                                                Patient Care Team:  Angelo Mckinnon APRN as PCP - General (Family Medicine)  Francie Cabrera APRN as Nurse Practitioner (Nurse Practitioner)        Subjective         Santiago Mcgrwa is comfortable, tolerating p.o with minimal nausea.  He is tolerating a regular diet at this point and is measuring his meals to small portions appropriately.  No incidence of vomiting today.  He has been voiding as well.  He states he feels better.        Review of Systems:      Review of Systems - General ROS: negative  Respiratory ROS: no cough, shortness of breath, or wheezing  Cardiovascular ROS: no chest pain or dyspnea on exertion  Gastrointestinal ROS: no abdominal pain, change in bowel habits, or black or bloody stools           Objective         Vital Signs  /67 (BP Location: Right arm, Patient Position: Lying)   Pulse 75   Temp 98 °F (36.7 °C) (Oral)   Resp 16   Ht 182.9 cm (72\")   Wt (!) 149 kg (327 lb 6.4 oz)   SpO2 100%   BMI 44.40 kg/m²      Physical Exam:     HEENT: extra ocular movement intact and sclera clear, anicteric  Respiratory: appears well, vitals normal, no respiratory distress, acyanotic, normal RR, chest clear, no wheezing, crepitations, rhonchi, normal symmetric air entry  Cardiovascular: Regular rate and rhythm, S1, S2 normal, no murmur, click, " rub or gallop  GI: Soft, non-tender, normal bowel sounds; no bruits, organomegaly or masses.  Abnormal shape: obese  Musculoskeletal: inspection - no abnormality, exception left foot swelling consistent with gout diagnosis  Neurologic: alert, oriented, normal speech, no focal findings or movement disorder noted                Results Review:    Labs reviewed     Medication Reviewed:             Current Facility-Administered Medications   Medication Dose Route Frequency Provider Last Rate Last Admin   • Adult Standard Central TPN   Intravenous Q24H (TPN) Solitario Luo MD 25 mL/hr at 04/27/23 0650 Currently Infusing at 04/27/23 0650   • Adult Standard Central TPN   Intravenous Q24H (TPN) Solitario Luo MD       • amoxicillin-clavulanate (AUGMENTIN) 875-125 MG per tablet 1 tablet  1 tablet Oral Q12H Ramandeep Malone MD   1 tablet at 04/27/23 1117   • dextrose 5 % and sodium chloride 0.9 % with KCl 40 mEq/L infusion  75 mL/hr Intravenous Continuous Ramandeep Malone MD 75 mL/hr at 04/27/23 1031 75 mL/hr at 04/27/23 1031   • Fat Emulsion Plant Based (INTRALIPID,LIPOSYN) 20 % infusion 50 g  250 mL Intravenous Q24H (TPN) Solitario Luo MD       • iopamidol (ISOVUE-300) 61 % injection 50 mL  50 mL Oral Once in imaging Solitario Luo MD       • metoclopramide (REGLAN) tablet 10 mg  10 mg Oral TID AC Ramandeep Malone MD   10 mg at 04/27/23 1117   • ondansetron ODT (ZOFRAN-ODT) disintegrating tablet 8 mg  8 mg Oral Q8H Ramandeep Malone MD   8 mg at 04/27/23 1320   • Pharmacy to Dose TPN   Does not apply Continuous PRN Solitario Luo MD       • sodium chloride 0.9 % flush 10 mL  10 mL Intravenous Q12H Solitario Luo MD   10 mL at 04/27/23 0800   • sodium chloride 0.9 % flush 10 mL  10 mL Intravenous PRN Solitario Luo MD       • sodium chloride 0.9 % flush 10 mL  10 mL Intravenous Q12H Solitario Luo MD   10 mL at 04/27/23 0800   • sodium chloride 0.9 % flush 10 mL  10 mL Intravenous Q12H Valerio,  Solitario WARE MD   10 mL at 04/27/23 0800   • sodium chloride 0.9 % flush 10 mL  10 mL Intravenous PRN Solitario Luo MD       • sodium chloride 0.9 % flush 20 mL  20 mL Intravenous PRN Solitario Luo MD       • sodium chloride 0.9 % infusion 40 mL  40 mL Intravenous PRN Solitario Luo MD       • sodium chloride 0.9 % infusion 40 mL  40 mL Intravenous PRN Solitario Luo MD       • thiamine (B-1) injection 50 mg  50 mg Intramuscular BID Solitario Luo MD   50 mg at 04/27/23 0800               Assessment & Plan      After discussion with gastroenterology it appears that his symptoms are consistent with enteritis.  a. Suspect delayed gastric emptying, possible Covid induced enteritis with prolonged symptoms. Rec scheduled antiemetics and reglan for now, advance diet slowly.  We will convert antiemetics and Reglan to p.o. for a 24-hour trial.  If he does well and tolerating a diet, okay to discharge home. Would use reglan for no more than 2 weeks then d/c.  b. Patient only had diarrhea 2 occasions, once on Monday evening and another on Tuesday morning.  He has had no further episodes.  If diarrhea recurs, will send full infectious work-up including GI PCR panel.          Hypokalemia-I change his IV fluids yesterday to include potassium.  He is also receiving TPN.  His potassium is now normal.  No need for further potassium chloride rider and would avoid p.o. dosing as it may exacerbate nausea.  Okay for the antibiotic regimen for possible atelectasis.  We will continue per recommendation of GI.  We will continues TPN and then DC home possibly soon on PPN.   will be consulted for assistance with home health for continued PPN replacement and PICC line care.  Diarrhea has resolved and will continue to monitor patient.  Of note patient will be discharged charged home with his PICC line due to his recurrent admissions from diarrhea, nausea vomiting and dehydration.  We will also check his vitamin  levels/status.              Solitario Luo MD  04/27/23  14:58 CDT                              History & Physical      Solitario Luo MD at 04/25/23 2114          H&P reviewed. The patient was examined and there are no changes to the H&P.          Electronically signed by Solitario Luo MD at 04/25/23 2114   Source Note            Patient Care Team:  Angelo Mckinnon APRN as PCP - General (Family Medicine)  Francie Cabrera APRN as Nurse Practitioner (Nurse Practitioner)    Subjective     Santiago Mcgraw is a 47 y.o. male.     Santiago is post op approximately 2 months from his sleeve gastrectomy procedure.  He still has intermittent nausea and vomiting.  He is currently on his stage IV diet.  He stated yesterday he woke up with vomiting.  He had then remained nauseated throughout the day today.  He was able to tolerate his stage IV diet on Saturday and Sunday which included ground turkey, solids and vegetables.  He does not know what exacerbates his nausea or vomiting.  He now is fraught with diarrhea.  He has been having recurrent gout symptoms as well occurring between the right and left foot.  Subsequent questioning he disclosed that he had pain in his left thigh on the medial aspect/location.        Review Of Systems:  General ROS: positive for  - fatigue  Respiratory ROS: no cough, shortness of breath, or wheezing  Cardiovascular ROS: no chest pain or dyspnea on exertion  Gastrointestinal ROS: no abdominal pain, change in bowel habits, or black or bloody stools  Positive for nausea and vomiting  Musculoskeletal ROS: positive for - muscle pain    The following portions of the patient's history were reviewed and updated as appropriate: allergies, current medications, past family history, past medical history, past social history, past surgical history and problem list.    Objective   There were no vitals taken for this visit.  There were no vitals filed for this visit.     General Appearance:   awake, alert, oriented, in no acute distress  Lungs:  Normal expansion.  Clear to auscultation.  No rales, rhonchi, or wheezing.  Heart:  Heart regular rate and rhythm  Abdomen:  Soft, non-tender, normal bowel sounds; no bruits, organomegaly or masses.  Abnormal shape: obese  Extremities: Right thigh tenderness in the medial aspect no gross swelling however there is pain presumably secondary to his gout in the left foot with some swelling.    ASSESSMENT/ PLAN    Encounter Diagnoses   Name Primary?   • Nausea and vomiting, unspecified vomiting type Yes   • Status post laparoscopic sleeve gastrectomy      The patient's intermittent nausea and vomiting is of a concern and I have explained to the patient that I will be obtaining a second opinion.  He does have a history of Micrell cholelithiasis however his symptoms beyond the nausea and intermittent vomiting are not consistent with acute cholecystitis.  He also has obtained labs per my recommendation.  He complained of numbness in his right lower quadrant location consistent with his incisional site.  My concern is this is more than likely due to the surgical site however I would want to rule out vitamin depletion or deficiency due to his poor intake.  Of note the patient has been admittedly utilizing vitamin patches consistently but I will reassess this as well.  I will obtain a CBC and CMP.  The CBC has showed mild leukocytosis.  The patient does not complain of abdominal pain so I do not believe it is related to his gallbladder and probably more related to his gout which has reflared up again.   He is currently stable and will await a second opinion.  If his labs return with signs consistent with elevation of his LFTs then this would prompt more of a recent of the cause of his symptoms and I would be more confident in removing his gallbladder as the etiology/offending agent of his nausea.      04/25/23  21:14 CDT  Patient Care Team:  Angelo Mckinnon APRN  as PCP - General (Family Medicine)  Francie Cabrera APRN as Nurse Practitioner (Nurse Practitioner)  Solitario Luo MD FACS      Electronically signed by Solitario Luo MD at 04/25/23 2114             Solitario Luo MD at 04/25/23 2114            Patient Care Team:  Angelo Mckinnon APRN as PCP - General (Family Medicine)  Francie Cabrera APRN as Nurse Practitioner (Nurse Practitioner)    Subjective     Santiago Mcgraw is a 47 y.o. male.     Santiago is post op approximately 2 months from his sleeve gastrectomy procedure.  He still has intermittent nausea and vomiting.  He is currently on his stage IV diet.  He stated yesterday he woke up with vomiting.  He had then remained nauseated throughout the day today.  He was able to tolerate his stage IV diet on Saturday and Sunday which included ground turkey, solids and vegetables.  He does not know what exacerbates his nausea or vomiting.  He now is fraught with diarrhea.  He has been having recurrent gout symptoms as well occurring between the right and left foot.  Subsequent questioning he disclosed that he had pain in his left thigh on the medial aspect/location.        Review Of Systems:  General ROS: positive for  - fatigue  Respiratory ROS: no cough, shortness of breath, or wheezing  Cardiovascular ROS: no chest pain or dyspnea on exertion  Gastrointestinal ROS: no abdominal pain, change in bowel habits, or black or bloody stools  Positive for nausea and vomiting  Musculoskeletal ROS: positive for - muscle pain    The following portions of the patient's history were reviewed and updated as appropriate: allergies, current medications, past family history, past medical history, past social history, past surgical history and problem list.    Objective   There were no vitals taken for this visit.  There were no vitals filed for this visit.     General Appearance:  awake, alert, oriented, in no acute distress  Lungs:  Normal expansion.  Clear to  auscultation.  No rales, rhonchi, or wheezing.  Heart:  Heart regular rate and rhythm  Abdomen:  Soft, non-tender, normal bowel sounds; no bruits, organomegaly or masses.  Abnormal shape: obese  Extremities: Right thigh tenderness in the medial aspect no gross swelling however there is pain presumably secondary to his gout in the left foot with some swelling.    ASSESSMENT/ PLAN    Encounter Diagnoses   Name Primary?   • Nausea and vomiting, unspecified vomiting type Yes   • Status post laparoscopic sleeve gastrectomy      The patient's intermittent nausea and vomiting is of a concern and I have explained to the patient that I will be obtaining a second opinion.  He does have a history of Micrell cholelithiasis however his symptoms beyond the nausea and intermittent vomiting are not consistent with acute cholecystitis.  He also has obtained labs per my recommendation.  He complained of numbness in his right lower quadrant location consistent with his incisional site.  My concern is this is more than likely due to the surgical site however I would want to rule out vitamin depletion or deficiency due to his poor intake.  Of note the patient has been admittedly utilizing vitamin patches consistently but I will reassess this as well.  I will obtain a CBC and CMP.  The CBC has showed mild leukocytosis.  The patient does not complain of abdominal pain so I do not believe it is related to his gallbladder and probably more related to his gout which has reflared up again.   He is currently stable and will await a second opinion.  If his labs return with signs consistent with elevation of his LFTs then this would prompt more of a recent of the cause of his symptoms and I would be more confident in removing his gallbladder as the etiology/offending agent of his nausea.      04/25/23  21:14 CDT  Patient Care Team:  Angelo Mckinnon APRN as PCP - General (Family Medicine)  Francie Cabrera APRN as Nurse Practitioner  (Nurse Practitioner)  Solitario Luo MD FACS      Electronically signed by Solitario Luo MD at 04/25/23 2053

## 2023-04-27 NOTE — CASE MANAGEMENT/SOCIAL WORK
Continued Stay Note   Leanne     Patient Name: Santiago Mcgraw  MRN: [id number]  Today's Date: 4/27/2023    Admit Date: 4/25/2023    Plan: Home   Discharge Plan     Row Name 04/27/23 1544       Plan    Plan Home    Patient/Family in Agreement with Plan yes    Plan Comments Pt has orders for home TPN. Spoke with pt to discuss. Orders sent to [facility] and following for insurance approval.               Discharge Codes    No documentation.               Expected Discharge Date and Time     Expected Discharge Date Expected Discharge Time    Apr 28, 2023             ARAVIND Chu

## 2023-04-27 NOTE — PROGRESS NOTES
"4/27/2023  Santiago Mcgraw   1975    DATE OF ADMISSION:   LENGTH OF STAY:  4/25/2023     0 days    Subjective:     Discussed with Dr. Luo last night.  Pt's diet advanced.  Pt now on scheduled zofran and reglan instead of prn dosing.      He is doing very well.  He is sitting up in bed.  He tolerated regular diet last night.  We called his wife and spoke to her together.  They had questions about the CT findings of right middle lobe fluid and possible pneumonia.    He has no fever, chills or sweats.  Leukocytosis improved today.  No significant left shift initially.  White count today is 7.13.    He denies shortness of air or cough.      Objective:   /71 (BP Location: Right arm, Patient Position: Lying)   Pulse 72   Temp 98 °F (36.7 °C) (Oral)   Resp 16   Ht 182.9 cm (72\")   Wt (!) 149 kg (327 lb 6.4 oz)   SpO2 97%   BMI 44.40 kg/m²       Prior to Admission medications    Medication Sig Start Date End Date Taking? Authorizing Provider   buPROPion XL (WELLBUTRIN XL) 150 MG 24 hr tablet Take 1 tablet by mouth Every Morning. 12/5/22  Yes Germán Raza MD   metoclopramide (Reglan) 5 MG tablet Take 1 tablet by mouth Every Morning. 4/25/23  Yes Solitario Luo MD   NON FORMULARY Place 1 each on the skin as directed by provider Daily. Calcium patch (PatchAid)   Yes Germán Raza MD   NON FORMULARY Place 1 each on the skin as directed by provider Daily. Vitamin B-12 patch (PatchAid)   Yes Germán Raza MD   NON FORMULARY Place 1 each on the skin as directed by provider Daily. Multivitamin Patch (PatchAid)   Yes Germán Raza MD   ondansetron ODT (ZOFRAN-ODT) 4 MG disintegrating tablet Place 1 tablet on the tongue Every 8 (Eight) Hours As Needed for Nausea or Vomiting.   Yes Germán Raza MD   pantoprazole (PROTONIX) 40 MG EC tablet Take 1 tablet by mouth Daily. 4/10/23  Yes Solitario Luo MD   potassium chloride (KLOR-CON) 20 MEQ CR tablet 2 tablets once daily " x 10 days 4/25/23  Yes Deborah Francie HILDA DACOSTA   promethazine (PHENERGAN) 12.5 MG tablet Take 1 tablet by mouth Every 8 (Eight) Hours As Needed for Nausea or Vomiting.   Yes Provider, MD Germán      Current Facility-Administered Medications   Medication Dose Route Frequency Provider Last Rate Last Admin   • Adult Standard Central TPN   Intravenous Q24H (TPN) Solitario Luo MD 25 mL/hr at 04/27/23 0650 Currently Infusing at 04/27/23 0650   • dextrose 5 % and sodium chloride 0.9 % with KCl 40 mEq/L infusion  125 mL/hr Intravenous Continuous Ramandeep Malone  mL/hr at 04/27/23 0631 125 mL/hr at 04/27/23 0631   • iopamidol (ISOVUE-300) 61 % injection 50 mL  50 mL Oral Once in imaging Solitario Luo MD       • metoclopramide (REGLAN) injection 10 mg  10 mg Intravenous Q8H Ramandeep Malone MD   10 mg at 04/27/23 0408   • ondansetron (ZOFRAN) 8 mg/50 mL NS IVPB  8 mg Intravenous Q8H Ramandeep Malone MD   Stopped at 04/27/23 0430   • Pharmacy to Dose TPN   Does not apply Continuous PRN Solitario Luo MD       • sodium chloride 0.9 % flush 10 mL  10 mL Intravenous Q12H Solitario Luo MD   10 mL at 04/26/23 2031   • sodium chloride 0.9 % flush 10 mL  10 mL Intravenous PRN Solitario Luo MD       • sodium chloride 0.9 % flush 10 mL  10 mL Intravenous Q12H Solitario Luo MD   10 mL at 04/26/23 2031   • sodium chloride 0.9 % flush 10 mL  10 mL Intravenous Q12H Solitario Luo MD   10 mL at 04/26/23 2029   • sodium chloride 0.9 % flush 10 mL  10 mL Intravenous PRN Solitario Luo MD       • sodium chloride 0.9 % flush 20 mL  20 mL Intravenous PRN Solitario Luo MD       • sodium chloride 0.9 % infusion 40 mL  40 mL Intravenous PRN Solitario Luo MD       • sodium chloride 0.9 % infusion 40 mL  40 mL Intravenous PRN Solitario Luo MD       • thiamine (B-1) injection 50 mg  50 mg Intramuscular BID Solitario Luo MD   50 mg at 04/26/23 2030          General Appearance:  This is a 47  y.o. year old male in no acute distress.  HEENT: Normocephalic, atraumatic, pupils equal, round, reactive to light, Oral cavity clear  Neck: No adenopathy, thyromegaly or mass.   Chest: Good expansion, clear to auscultation bilaterally    Heart: Regular rate and rhythm. No murmurs, gallops or rubs  Abdomen: Soft, non-distended. Normal bowel sounds present throughout.  No tenderness, no guarding or rebound. No hepatosplenomegaly. No hernia.    Extremities: No edema.2+pulses peripherally  Skin: No rash or jaundice. No spider angiomata. No palmar erythema.  Neurologic:  Alert. Normal speech. Oriented. Nonfocal.  Psychiatric:  Normal affect.     Results from last 7 days   Lab Units 04/27/23  0602 04/25/23  1531   WBC 10*3/mm3 7.78 13.34*   HEMOGLOBIN g/dL 10.7* 12.6*   HEMATOCRIT % 33.5* 38.8   PLATELETS 10*3/mm3 205 230     Results from last 7 days   Lab Units 04/26/23  0449 04/25/23  1531 04/20/23  1535   SODIUM mmol/L 140 137  --    POTASSIUM mmol/L 3.2* 3.0* 3.4*   CHLORIDE mmol/L 101 98  --    CO2 mmol/L 28.0 28.0  --    BUN mg/dL 11 11  --    CREATININE mg/dL 0.99 1.12  --    CALCIUM mg/dL 8.6 9.2  --    BILIRUBIN mg/dL 0.7 0.9  --    ALK PHOS U/L 70 85  --    ALT (SGPT) U/L 15 19  --    AST (SGOT) U/L 11 14  --    GLUCOSE mg/dL 107* 110*  --          No results found for: LIPASE  TIBC   Date Value Ref Range Status   04/26/2023 152 (L) 298 - 536 mcg/dL Final     Vitamin B-12   Date Value Ref Range Status   04/25/2023 208 (L) 211 - 946 pg/mL Final     Folate   Date Value Ref Range Status   04/25/2023 2.74 (L) 4.78 - 24.20 ng/mL Final       STOOL OCCULT BLOOD  No results found for: OCCULTBLD     IMAGING:  XR Foot 3+ View Left    Result Date: 4/13/2023  Narrative: EXAMINATION: XR FOOT 3+ VW LEFT-  4/13/2023 12:20 PM CDT  HISTORY: Severe left foot pain.  Left foot, 3 views.  Moderate calcaneal spurring.  Dorsal midfoot spurring.  Intact metatarsals and toes.  Summary: 1. Degenerative spurring. 2. No acute bony  abnormality.  This report was finalized on 04/13/2023 12:32 by Dr. Benji Alvarez MD.    CT Abdomen Pelvis With Contrast    Result Date: 4/26/2023  Narrative: EXAMINATION: CT ABDOMEN PELVIS W CONTRAST- 4/26/2023 1:14 PM CDT  HISTORY: recurrent N/V post sleeve gastrectomy; R11.2-Nausea with vomiting, unspecified; K59.1-Functional diarrhea; E44.0-Moderate protein-calorie malnutrition; R11.2-Nausea with vomiting, unspecified  DOSE: 1909 mGycm (Automatic exposure control technique was implemented in an effort to keep the radiation dose as low as possible without compromising image quality)  REPORT: Spiral CT of the abdomen and pelvis was performed after administration of intravenous contrast from the lung bases through the pubic symphysis. Reconstructed coronal and sagittal images are also reviewed.  COMPARISON: There are no correlative imaging studies for comparison.  Review of lung windows demonstrates a focal area of patchy infiltrate and consolidation in the inferior aspect of the right middle lobe. This could be due to atelectasis or pneumonia. Clinical correlation is needed. No pleural effusion is identified. The liver and spleen are homogeneous without evidence of a mass. There is mild distention of the gallbladder. The pancreas and right adrenal gland are normal. There is a nodule within the left adrenal gland which contains macroscopic fat, the nodule measures 1.6 cm, most compatible with a benign myelolipoma. There is evidence of previous gastric sleeve surgery. The kidneys enhance normally, no renal mass or hydronephrosis is identified. The ureters are decompressed. There is poor distention of the urinary bladder, with mild wall thickening which is probably artifactual. There is mild prostate enlargement. Loops of colon are normal caliber without wall thickening or mass effect. The appendix is normal. There are multiple mildly distended fluid-filled small bowel loops in the central left abdomen, the most  distended loops measure in the range of 4.4 cm. There are some segments of small bowel in this region which show mild mucosal thickening and this may be related to jejunitis or potentially Crohn's disease with skip lesions. Low-grade partial small bowel obstruction is felt less likely and there is no discrete transition point. The ileum appears decompressed, though there are a few loops show mild mucosal thickening. No free fluid or free air is identified. No intra-abdominal abscess is identified. There is no intra-abdominal pelvic lymphadenopathy. There is mild fatty infiltration of the inguinal canals bilaterally. Review of bone windows shows evidence previous left hip fixation.      Impression: 1. Patchy infiltrate with partial consolidation of the right middle lobe, atelectasis versus pneumonia. Clinical correlation is recommended. 2. Evidence previous gastric sleeve surgery, there are are fluid filled distended small bowel loops in the central left abdomen, with segments of mild mucosal thickening, there also appears to be mild segmental mucosal thickening within some loops of the ileum, which is decompressed. Differential possibilities include inflammatory bowel disease with skip lesions associated with Crohn's, as well as nonspecific enteritis less likely partial small bowel obstruction, no transition point is identified. 3. Normal appendix. No free fluid, free air or intra-abdominal abscess. 4. Fat-containing nodule in the left adrenal gland measuring 1.6 cm, a small adrenal myelolipoma is most likely.   This report was finalized on 04/26/2023 13:39 by Dr. Johnson Puckett MD.    US Gallbladder    Result Date: 4/17/2023  Narrative: US GALLBLADDER-4/17/2023 9:11 PM CDT  REASON FOR EXAM: Nause/vomiting   COMPARISON: NONE  TECHNIQUE: Multiple longitudinal and transverse realtime sonographic images of the right upper quadrant of the abdomen are obtained.  FINDINGS:  Pancreas: Poorly visualized on this exam.   Liver: Normal in size, echogenicity and echotexture. Portal veins are patent with hepatopedal flow. Limited evaluation in terms of lesion assessment.  Gallbladder: Layering gallbladder sludge. Gallbladder is nondistended. Borderline abnormal wall thickening identified. No shadowing stones are seen. No pericholecystic fluid.  Bile ducts: The CBD measures 0.5 cm in diameter. There is no intrahepatic or extrahepatic ductal dilation.  Right kidney: Normal in size, shape and contour. No mass, hydronephrosis or calculi. No perinephric fluid collection.   Other: The visualized abdominal aorta is normal in caliber.      Impression: 1. No evidence of acute cholecystitis. 2. Layering gallbladder sludge with borderline abnormal wall thickening, perhaps chronic cholecystitis.   This report was finalized on 04/17/2023 21:47 by Dr Nabil Cohen, .    US Venous Doppler Lower Extremity Left (duplex)    Result Date: 4/13/2023  Narrative: History: Pain and swelling      Impression: Impression: There is no evidence of deep venous thrombosis or superficial thrombophlebitis of the left lower extremity.  Comments: Left lower extremity venous duplex exam was performed using color Doppler flow, Doppler wave form analysis, and grayscale imaging, with and without compression. There is no evidence of deep venous thrombosis of the common femoral, superficial femoral, popliteal, posterior tibial, and peroneal veins. There is no thrombus identified in the saphenofemoral junction or the greater saphenous vein.  This report was finalized on 04/13/2023 15:40 by Dr. Tolu Rios MD.    US Venous Doppler Lower Extremity Bilateral (duplex)    Result Date: 4/26/2023  Narrative: History: Swelling      Impression: Impression: There is no evidence of deep venous thrombosis or superficial thrombophlebitis of right or left lower extremities.  Comments: Bilateral lower extremity venous duplex exam was performed using color Doppler flow, Doppler waveform  analysis, and grayscale imaging, with and without compression. There is no evidence of deep venous thrombosis in the common femoral, superficial femoral, popliteal, peroneal, anterior tibial, and posterior tibial veins bilaterally. No thrombus is identified in the saphenofemoral junctions and greater saphenous veins bilaterally.   This report was finalized on 04/26/2023 16:23 by Dr. Tolu Rios MD.    FL Upper GI Single Contrast    Result Date: 4/19/2023  Narrative: FL UPPER GI SINGLE-CONTRAST- 4/19/2023 9:26 AM CDT  HISTORY: Assessment of upper GI system due to chronic nausea and vomiting. Prior sleeve gastrectomy.  COMPARISON: 4/17/2023  FLUOROSCOPY RADIATION TIME: 1.34 minutes FLUOROSCOPY DOSE: 184 mGy NUMBER OF IMAGES: 43  Findings:  Patient drank thin barium out of a cup and fluoroscopic images were obtained.  Normal esophageal motility. No esophageal filling defect or stricture. GE junction appears anatomically located. Contrast quickly flows through the GE junction into the stomach.  Contrast readily fills the stomach with postoperative change of sleeve gastrectomy noted. No gastric filling defect. No evidence of gastric contrast leak.  There is slight delayed emptying of the stomach but contrast does freely flow into the duodenum and into the jejunum.      Impression:  1.  No evidence of bowel obstruction or marked bowel luminal narrowing. No evidence of contrast leak. 2.  Contrast freely flows through the esophagus, into the stomach, and into the duodenum and jejunum (although there is slight delayed emptying of the stomach). This report was finalized on 04/19/2023 09:56 by Dr. Tom Gooden MD.    FL Upper GI Water Soluble    Result Date: 4/17/2023  Narrative: EXAMINATION: FL UPPER GI WATER SOLUBLE-  4/17/2023 9:49 AM CDT  HISTORY: GERD; K52.9-Noninfective gastroenteritis and colitis, unspecified; Z87.898-Personal history of other specified conditions; Z98.84-Bariatric surgery status  The fluoroscopy  is performed and images of the upper changes are obtained during and after ingestion of water soluble contrast material/Gastrografin.  There is no previous study for comparison.  Patient is extremely nauseous and have a significant difficulty in initiating the swallowing with Gastrografin. Patient only able to take very small sips of the Gastrografin. The study, therefore, is of very limited diagnostic value.  No significant or obvious abnormality of the esophagus are noted. No obstruction or constriction. Normal peristalsis.  There is intermittently visualized small hiatal hernia. There is moderate spontaneous gastroesophageal reflux.  Postsurgical changes/deformity of the stomach is seen. The stomach is poorly evaluated due to lack of sufficient contrast in the stomach. No obvious leakage of the bolus. The mucosal pattern is not optimally visualized or evaluated.  The duodenal bulb is not optimally opacified or evaluated. The duodenal loop is not opacified.      Impression: 1. Limited diagnostic study due to patient's inability to swallow adequate amount of contrast material for optimal evaluation. 2. The esophagus is grossly normal. No intrinsic abnormality. 3. An intermittently visualized small hiatal hernia and a moderate spontaneous gastroesophageal reflux. 4. Stomach is not optimally visualized or evaluated. No obvious or significant leakage/extravasation. The duodenum is not optimally opacified or evaluated. 5. Fluoroscopy time: 2 minutes 13 seconds. 6. The dose: 251mGy. 7. Number of images: 62 This report was finalized on 04/17/2023 11:01 by Dr. Liliana Dan MD.       ASSESSMENT/PLAN:   1. Recurrent nausea and vomiting  2. Post sleeve gastrectomy  3. Suspect post surgical delayed gastric emptying  4. Possible enteritis based on CT  5. Recent COVID infection approximately 3 weeks ago, no upper respiratory symptoms, just GI symptoms  6. Microlithiasis and borderline GB wall thickening  7. Acute  diarrhea-resolved.  a. Suspect delayed gastric emptying, possible Covid induced enteritis with prolonged symptoms. Rec scheduled antiemetics and reglan for now, advance diet slowly.  We will convert antiemetics and Reglan to p.o. for a 24-hour trial.  If he does well and tolerating a diet, okay to discharge home. Would use reglan for no more than 2 weeks then d/c.  b. Patient only had diarrhea 2 occasions, once on Monday evening and another on Tuesday morning.  He has had no further episodes.  If diarrhea recurs, will send full infectious work-up including GI PCR panel.  8. Hypokalemia-I change his IV fluids yesterday to include potassium.  He is also receiving TPN.  His potassium is now normal.  No need for further potassium chloride rider and would avoid p.o. dosing as it may exacerbate nausea.  9. Right middle lobe infiltrate/asterixis  a. Suspected aspiration.  Patient without shortness of air or cough.  Start incentive spirometry.  I recommend Augmentin.  I went ahead and wrote that order.    RECOMMENDATIONS:   1. Continue current IVFs but decrease to 75 cc/h given TPN.  2. Continue TPN  for now  3. Continue scheduled zofran and reglan but change to p.o. dosing.  4. Limit reglan to only 2 weeks.  5. Advance diet slowly decreased  6. Recommend antibiotics to cover for probable right middle lobe aspiration pneumonia.  Augmentin x7 days.  7. Recommend incentive spirometry.  8. Recommend observation for 24 hours as we are converting to p.o. antiemetics and motility agent.    No further recs from GI.  Will s/o. Please call with any questions.    Ramandeep Malone MD, DENISE MURCIA  07:13 CDT    DISCLAIMER:    This physician works through a [employer] as an inpatient consultant gastroenterologist only and has no outpatient clinic for patient follow up.  Any results not available at time of inpatient discharge and/or GI clinic follow up should be managed by the hospitalist team, PCP, or outpatient  gastroenterologist.    Note to Patient:   The 21st Century Cures Act makes medical notes like this available to patients in the interest of transparency; however, please be advised this is a medical document.  It is intended as [id number] communication.  It is written in medical language and may contain abbreviations or verbiage that are unfamiliar.  It may appear blunt or direct.  Medical documents are intended to carry relevant information, facts as evident, and the clinical opinion of the practitioner.  This note may have been transcribed using a voice dictation system.  Voice-recognition errors may occur.  This should not be taken to alter the content or meaning of this note.

## 2023-04-27 NOTE — CONSULTS
Adult Nutrition  Assessment/PES    Patient Name:  Santiago Mcgraw  YOB: 1975  MRN: [id number]  Admit Date:  4/25/2023    Assessment Date:  4/27/2023    Comments:  Consult received for recommendations for home TPN. Pt is currently receiving a bariatric stage 5 diet. He has consumed avg of 92% of the last 3 meals. He was started on TPN 4/26 d/t being at risk of malnutrition r/t persistent N/V/D. He is s/p sleeve gastrectomy 2/23/23. TPN of D15% Aa5% with lipids daily was initiated at 25mL/hour for the first 24 hours. This evening, TPN rate will increase to 100mL/hour.    Since pt has been able to tolerate the current bariatric stage 5 diet and has consumed 92% of the last 3 meals, will recommend a lower TPN rate for home or PPN to meet protein needs.    Home PPN recommendations: Dextrose 5%, Amino acids 4.25% at 75mL/hour.  or  Home TPN recommendations: Dextrose 15%, Amino Acids 5% at 65mL/hour with 20% lipid emulsion, 250mL, daily.    PPN will provide 612 kcal and 77 grams of protein daily. This will meet 29% of est'd kcal needs and 73% of est'd protein needs.     TPN will provide 1608 kcal and 78 grams of protein daily. This will meet 77% of est'd kcal needs and 74% of est'd protein needs to supplement po intake.        Reason for Assessment     Row Name 04/27/23 1342          Reason for Assessment    Reason For Assessment follow-up protocol  New consult for home TPN assessment     Diagnosis surgery/postoperative complications  s/p sleeve gastrectomy with N/V/D                Nutrition/Diet History     Row Name 04/27/23 1342          Nutrition/Diet History    Typical Intake (Food/Fluid/EN/PN) Pt is currently ordered a GI, fat restricted, high fiber, low irritant, lactose controlled, bariatric stage 5 diet. Pt also ordered TPN d/t at risk for malnutrition, specifically protein.     Factors Affecting Nutritional Intake nausea;altered gastrointestinal function                Labs/Tests/Procedures/Meds      Row Name 04/27/23 1354          Labs/Procedures/Meds    Lab Results Reviewed reviewed, pertinent     Lab Results Comments H/H, alb 2.9, PAB 16.5        Diagnostic Tests/Procedures    Diagnostic Test/Procedure Reviewed reviewed, pertinent     Diagnostic Test/Procedures Comments s/p lap sleeve gastrectomy 2/23/23        Medications    Pertinent Medications Reviewed reviewed, pertinent     Pertinent Medications Comments augmentin, reglan, thiamine, zofran, reglan                  Estimated/Assessed Needs - Anthropometrics     Row Name 04/27/23 1356          Anthropometrics    Weight for Calculation 149 kg (328 lb 7.8 oz)        Estimated/Assessed Needs    Additional Documentation KCAL/KG (Group)        KCAL/KG    KCAL/KG 14 Kcal/Kg (kcal)     14 Kcal/Kg (kcal) 2086        Protein Requirements    Weight Used For Protein Calculations 80.7 kg (178 lb)  IBW     Est Protein Requirement Amount (gms/kg) 1.3 gm protein     Estimated Protein Requirements (gms/day) 104.96        Fluid Requirements    Fluid Requirements (mL/day) 2086     Estimated Fluid Requirement Method other (see comments)  1mL/kcal     RDA Method (mL) 2086                Nutrition Prescription Ordered     Row Name 04/27/23 1359          Nutrition Prescription PO    Current PO Diet Bariatric  bariatric stage 5     Common Modifiers GI Soft/[place]  fat restricted, low irritant, lactose controlled, no straw        Nutrition Prescription PN    PN Route PICC     PN Goal Rate (mL/hr) 100 mL/hr     PN Current Rate (mL/hr) 25 mL/hr     PN Goal Volume (mL) 2400 mL     PN Current Volume (mL) 600 mL     Dextrose Concentration (%) 15 %     Dextrose (Kcal) 1224     Amino Acid Concentration (%) 5 %     Amino Acid (gm) 120     Lipid Concentration (%) 20%     Lipid Volume (mL) 250 mL     Lipid Frequency Daily                Evaluation of Received Nutrient/Fluid Intake     Row Name 04/27/23 1401          Nutrient/Fluid Evaluation    Number of Days Evaluated 2 days      Additional Documentation Fluid Intake Evaluation (Group)        Fluid Intake Evaluation    Oral Fluid (mL) 575     Other Fluid (mL) 2144  IVF's        PO Evaluation    Number of Meals 3     % PO Intake 92        PN Evaluation    Number of Days PN Evaluated Other (comment)  TPN started at 1800 on 4/26, no I/O recorded for TPN intake                   Problem/Interventions:     Problem 2     Row Name 04/27/23 1403          Nutrition Diagnoses Problem 2    Problem 2 Altered GI Function     Etiology (related to) Medical Diagnosis     Gastrointestinal Nausea;Vomiting;Diarrhea;Other (comment)  sleeve gastrectomy 2/23/23     Signs/Symptoms (evidenced by) Report of Minimal PO Intake;Unintended Weight Change     Unintended Weight Change Loss     Number of Pounds Lost 88#     Weight loss time period 3 months                    Intervention Goal     Row Name 04/27/23 1405          Intervention Goal    General Reduce/improve symptoms;Meet nutritional needs for age/condition;Disease management/therapy;Nutrition support treatment     PO Tolerate PO     TF/PN Adjust TF/PN  for home TPN     Weight Appropriate weight loss                Nutrition Intervention     Row Name 04/27/23 1405          Nutrition Intervention    RD/Tech Action Recommend/ordered;Follow Tx progress;Care plan reviewd     Recommended/Ordered PN                Nutrition Prescription     Row Name 04/27/23 1405          Nutrition Prescription PN    Parenteral Prescription PN begin/change;Parenteral to supply  Recommendations for home TPN     PN Route PICC     Dextrose Concentration (%) 15 %     Dextrose (Kcal) 796     Amino Acid Concentration (%) 5.0%     Amino Acid (gm) 78     PN Goal Rate (mL/hr) 65 mL/hr     PN Starting Rate (mL/hr) 65 mL/hr     PN Goal Volume (mL) 1560 mL     PN Starting Volume (mL) 1560 mL     Lipid Concentration (%) 20%     Lipid Volume (mL) 250 mL     Lipid Frequency Daily     New PN Prescription Ordered? No, recommended        PN to Supply     NPC/Day 1296 NPC/day     Kcal/Day 1608 Kcal/day     Kcal/Kg 11 Kcal/kg     Kcal/Kg Weight Method Actual weight     Protein (gm/day) 78 gm/day     Meet Estimated Kcal Need (%) 77 %     Meet Estimated Protein Need (%) 74 %                Education/Evaluation     Row Name 04/27/23 1410          Education    Education Other (comment)  TPN will have to be approved by insurance for home coverage        Monitor/Evaluation    Monitor Per protocol;I&O;PO intake;Pertinent labs;PN delivery/tolerance;Weight;Symptoms                 Electronically signed by:  Nolvia Mckeon RDN, LAURIE  04/27/23 14:11 CDT

## 2023-04-27 NOTE — PLAN OF CARE
Goal Outcome Evaluation:  No complaints of pain. IVF and TPN infusing, will increase TPN rate this evening as ordered. No complaints of nausea. PICC dressing CDI. Nutrition consulted per Dr. Luo for home TPN recs. SCDs, walking in room (per patient he can't walk far d/t gout in his left foot), voiding. Safety maintained.

## 2023-04-27 NOTE — PROGRESS NOTES
"Patient Care Team:  Angelo Mckinnon APRN as PCP - General (Family Medicine)  Francie Cabrera APRN as Nurse Practitioner (Nurse Practitioner)    Subjective     Santiago Mcgraw is comfortable, tolerating p.o with minimal nausea.  He is tolerating a regular diet at this point and is measuring his meals to small portions appropriately.  No incidence of vomiting today.  He has been voiding as well.  He states he feels better.       Review of Systems:     Review of Systems - General ROS: negative  Respiratory ROS: no cough, shortness of breath, or wheezing  Cardiovascular ROS: no chest pain or dyspnea on exertion  Gastrointestinal ROS: no abdominal pain, change in bowel habits, or black or bloody stools    Objective     Vital Signs  /67 (BP Location: Right arm, Patient Position: Lying)   Pulse 75   Temp 98 °F (36.7 °C) (Oral)   Resp 16   Ht 182.9 cm (72\")   Wt (!) 149 kg (327 lb 6.4 oz)   SpO2 100%   BMI 44.40 kg/m²     Physical Exam:    HEENT: extra ocular movement intact and sclera clear, anicteric  Respiratory: appears well, vitals normal, no respiratory distress, acyanotic, normal RR, chest clear, no wheezing, crepitations, rhonchi, normal symmetric air entry  Cardiovascular: Regular rate and rhythm, S1, S2 normal, no murmur, click, rub or gallop  GI: Soft, non-tender, normal bowel sounds; no bruits, organomegaly or masses.  Abnormal shape: obese  Musculoskeletal: inspection - no abnormality, exception left foot swelling consistent with gout diagnosis  Neurologic: alert, oriented, normal speech, no focal findings or movement disorder noted     Results Review:    Labs reviewed    Medication Reviewed:   Current Facility-Administered Medications   Medication Dose Route Frequency Provider Last Rate Last Admin   • Adult Standard [facility] TPN   Intravenous Q24H (TPN) Solitario Luo MD 25 mL/hr at 04/27/23 0650 Currently Infusing at 04/27/23 0650   • Adult Standard [place] TPN   Intravenous Q24H " (TPN) Solitario Luo MD       • amoxicillin-clavulanate (AUGMENTIN) 875-125 MG per tablet 1 tablet  1 tablet Oral Q12H Ramandeep Malone MD   1 tablet at 04/27/23 1117   • dextrose 5 % and sodium chloride 0.9 % with KCl 40 mEq/L infusion  75 mL/hr Intravenous Continuous Ramandeep Malone MD 75 mL/hr at 04/27/23 1031 75 mL/hr at 04/27/23 1031   • Fat Emulsion Plant Based (INTRALIPID,LIPOSYN) 20 % infusion 50 g  250 mL Intravenous Q24H (TPN) Solitario Luo MD       • iopamidol (ISOVUE-300) 61 % injection 50 mL  50 mL Oral Once in imaging Solitario Luo MD       • metoclopramide (REGLAN) tablet 10 mg  10 mg Oral TID AC Ramandeep Malone MD   10 mg at 04/27/23 1117   • ondansetron ODT (ZOFRAN-ODT) disintegrating tablet 8 mg  8 mg Oral Q8H Ramandeep Malone MD   8 mg at 04/27/23 1320   • Pharmacy to Dose TPN   Does not apply Continuous PRN Solitario Luo MD       • sodium chloride 0.9 % flush 10 mL  10 mL Intravenous Q12H Solitario Luo MD   10 mL at 04/27/23 0800   • sodium chloride 0.9 % flush 10 mL  10 mL Intravenous PRN Solitario Luo MD       • sodium chloride 0.9 % flush 10 mL  10 mL Intravenous Q12H Solitario Luo MD   10 mL at 04/27/23 0800   • sodium chloride 0.9 % flush 10 mL  10 mL Intravenous Q12H Solitario Luo MD   10 mL at 04/27/23 0800   • sodium chloride 0.9 % flush 10 mL  10 mL Intravenous PRN Solitario Luo MD       • sodium chloride 0.9 % flush 20 mL  20 mL Intravenous PRN Solitario Luo MD       • sodium chloride 0.9 % infusion 40 mL  40 mL Intravenous PRN Solitario Luo MD       • sodium chloride 0.9 % infusion 40 mL  40 mL Intravenous PRN Solitario Luo MD       • thiamine (B-1) injection 50 mg  50 mg Intramuscular BID Solitario Luo MD   50 mg at 04/27/23 0800       Assessment & Plan    After discussion with gastroenterology it appears that his symptoms are consistent with enteritis.  a. Suspect delayed gastric emptying, possible Covid induced enteritis  with prolonged symptoms. Rec scheduled antiemetics and reglan for now, advance diet slowly.  We will convert antiemetics and Reglan to p.o. for a 24-hour trial.  If he does well and tolerating a diet, okay to discharge home. Would use reglan for no more than 2 weeks then d/c.  b. Patient only had diarrhea 2 occasions, once on Monday evening and another on Tuesday morning.  He has had no further episodes.  If diarrhea recurs, will send full infectious work-up including GI PCR panel.          Hypokalemia-I change his IV fluids yesterday to include potassium.  He is also receiving TPN.  His potassium is now normal.  No need for further potassium chloride rider and would avoid p.o. dosing as it may exacerbate nausea.  Okay for the antibiotic regimen for possible atelectasis.  We will continue per recommendation of GI.  We will continues TPN and then DC home possibly soon on PPN.   will be consulted for assistance with home health for continued PPN replacement and PICC line care.  Diarrhea has resolved and will continue to monitor patient.  Of note patient will be discharged charged home with his PICC line due to his recurrent admissions from diarrhea, nausea vomiting and dehydration.  We will also check his vitamin levels/status.          Solitario Luo MD  04/27/23  14:58 CDT

## 2023-04-27 NOTE — PAYOR COMM NOTE
"Santiago Hough (47 y.o. Male) [id number]    Admit  INPT 4/27   obs 4/25,4/26    Failed obs stay   Still here [facility] phone    Fax      Date of Birth   1975    Social Security Number       Address   [address] [place] [address]    Home Phone   [phone]    MRN   [id number]       [employer]   [employer]    Marital Status                               Admission Date   4/25/23    Admission Type   Urgent    Admitting Provider   Solitario Luo MD    Attending Provider   Solitario Luo MD    Department, Room/Bed   [facility] 3C, 381/1       Discharge Date       Discharge Disposition       Discharge Destination                               Attending Provider: Solitario Luo MD    Allergies: Adhesive Tape, Latex    Isolation: None   Infection: None   Code Status: CPR    Ht: 182.9 cm (72\")   Wt: 149 kg (327 lb 6.4 oz)    Admission Cmt: None   Principal Problem: Nausea & vomiting [R11.2]                 Active Insurance as of 4/25/2023     Primary Coverage     Payor Plan Insurance Group Employer/Plan Group    [employer] [facility] EMPLOYEE [id number]     Payor Plan Address Payor Plan Phone Number Payor Plan Fax Number Effective Dates    [address] [phone]  1/1/2015 - None Entered    [place] [id number]       Subscriber Name Subscriber Birth Date Member ID       CARLOS HOUGH 12/29/1976 [id number]                 Emergency Contacts      (Rel.) Home Phone Work Phone Mobile Phone    CARLOS HOUGH (Spouse) [phone] -- --               History & Physical      Solitario Luo MD at 04/25/23 2114          H&P reviewed. The patient was examined and there are no changes to the H&P.          Electronically signed by Solitario Luo MD at 04/25/23 2114   Source Note            Patient Care Team:  Angelo Mckinnon APRN as PCP - General (Family Medicine)  Francie Cabrera APRN as Nurse " Practitioner (Nurse Practitioner)    Subjective     Santiago Mcgraw is a 47 y.o. male.     Santiago is post op approximately 2 months from his sleeve gastrectomy procedure.  He still has intermittent nausea and vomiting.  He is currently on his stage IV diet.  He stated yesterday he woke up with vomiting.  He had then remained nauseated throughout the day today.  He was able to tolerate his stage IV diet on Saturday and Sunday which included ground turkey, solids and vegetables.  He does not know what exacerbates his nausea or vomiting.  He now is fraught with diarrhea.  He has been having recurrent gout symptoms as well occurring between the right and left foot.  Subsequent questioning he disclosed that he had pain in his left thigh on the medial aspect/location.        Review Of Systems:  General ROS: positive for  - fatigue  Respiratory ROS: no cough, shortness of breath, or wheezing  Cardiovascular ROS: no chest pain or dyspnea on exertion  Gastrointestinal ROS: no abdominal pain, change in bowel habits, or black or bloody stools  Positive for nausea and vomiting  Musculoskeletal ROS: positive for - muscle pain    The following portions of the patient's history were reviewed and updated as appropriate: allergies, current medications, past family history, past medical history, past social history, past surgical history and problem list.    Objective   There were no vitals taken for this visit.  There were no vitals filed for this visit.     General Appearance:  awake, alert, oriented, in no acute distress  Lungs:  Normal expansion.  Clear to auscultation.  No rales, rhonchi, or wheezing.  Heart:  Heart regular rate and rhythm  Abdomen:  Soft, non-tender, normal bowel sounds; no bruits, organomegaly or masses.  Abnormal shape: obese  Extremities: Right thigh tenderness in the medial aspect no gross swelling however there is pain presumably secondary to his gout in the left foot with some swelling.    ASSESSMENT/  PLAN    Encounter Diagnoses   Name Primary?   • Nausea and vomiting, unspecified vomiting type Yes   • Status post laparoscopic sleeve gastrectomy      The patient's intermittent nausea and vomiting is of a concern and I have explained to the patient that I will be obtaining a second opinion.  He does have a history of Micrell cholelithiasis however his symptoms beyond the nausea and intermittent vomiting are not consistent with acute cholecystitis.  He also has obtained labs per my recommendation.  He complained of numbness in his right lower quadrant location consistent with his incisional site.  My concern is this is more than likely due to the surgical site however I would want to rule out vitamin depletion or deficiency due to his poor intake.  Of note the patient has been admittedly utilizing vitamin patches consistently but I will reassess this as well.  I will obtain a CBC and CMP.  The CBC has showed mild leukocytosis.  The patient does not complain of abdominal pain so I do not believe it is related to his gallbladder and probably more related to his gout which has reflared up again.   He is currently stable and will await a second opinion.  If his labs return with signs consistent with elevation of his LFTs then this would prompt more of a recent of the cause of his symptoms and I would be more confident in removing his gallbladder as the etiology/offending agent of his nausea.      04/25/23  21:14 CDT  Patient Care Team:  Angelo Mckinnon APRN as PCP - General (Family Medicine)  Francie Cabrera APRN as Nurse Practitioner (Nurse Practitioner)  Solitario Luo MD FACS      Electronically signed by Solitario Luo MD at 04/25/23 2114             Solitario Luo MD at 04/25/23 2114            Patient Care Team:  Angelo Mckinnon APRN as PCP - General (Family Medicine)  Francie Cabrera APRN as Nurse Practitioner (Nurse Practitioner)    Destiny Mcgraw is a 47 y.o.  male.     Santiago is post op approximately 2 months from his sleeve gastrectomy procedure.  He still has intermittent nausea and vomiting.  He is currently on his stage IV diet.  He stated yesterday he woke up with vomiting.  He had then remained nauseated throughout the day today.  He was able to tolerate his stage IV diet on Saturday and Sunday which included ground turkey, solids and vegetables.  He does not know what exacerbates his nausea or vomiting.  He now is fraught with diarrhea.  He has been having recurrent gout symptoms as well occurring between the right and left foot.  Subsequent questioning he disclosed that he had pain in his left thigh on the medial aspect/location.        Review Of Systems:  General ROS: positive for  - fatigue  Respiratory ROS: no cough, shortness of breath, or wheezing  Cardiovascular ROS: no chest pain or dyspnea on exertion  Gastrointestinal ROS: no abdominal pain, change in bowel habits, or black or bloody stools  Positive for nausea and vomiting  Musculoskeletal ROS: positive for - muscle pain    The following portions of the patient's history were reviewed and updated as appropriate: allergies, current medications, past family history, past medical history, past social history, past surgical history and problem list.    Objective   There were no vitals taken for this visit.  There were no vitals filed for this visit.     General Appearance:  awake, alert, oriented, in no acute distress  Lungs:  Normal expansion.  Clear to auscultation.  No rales, rhonchi, or wheezing.  Heart:  Heart regular rate and rhythm  Abdomen:  Soft, non-tender, normal bowel sounds; no bruits, organomegaly or masses.  Abnormal shape: obese  Extremities: Right thigh tenderness in the medial aspect no gross swelling however there is pain presumably secondary to his gout in the left foot with some swelling.    ASSESSMENT/ PLAN    Encounter Diagnoses   Name Primary?   • Nausea and vomiting, unspecified  vomiting type Yes   • Status post laparoscopic sleeve gastrectomy      The patient's intermittent nausea and vomiting is of a concern and I have explained to the patient that I will be obtaining a second opinion.  He does have a history of Micrell cholelithiasis however his symptoms beyond the nausea and intermittent vomiting are not consistent with acute cholecystitis.  He also has obtained labs per my recommendation.  He complained of numbness in his right lower quadrant location consistent with his incisional site.  My concern is this is more than likely due to the surgical site however I would want to rule out vitamin depletion or deficiency due to his poor intake.  Of note the patient has been admittedly utilizing vitamin patches consistently but I will reassess this as well.  I will obtain a CBC and CMP.  The CBC has showed mild leukocytosis.  The patient does not complain of abdominal pain so I do not believe it is related to his gallbladder and probably more related to his gout which has reflared up again.   He is currently stable and will await a second opinion.  If his labs return with signs consistent with elevation of his LFTs then this would prompt more of a recent of the cause of his symptoms and I would be more confident in removing his gallbladder as the etiology/offending agent of his nausea.      04/25/23  21:14 CDT  Patient Care Team:  Angelo Mckinnon APRN as PCP - General (Family Medicine)  Francie Cabrera APRN as Nurse Practitioner (Nurse Practitioner)  Solitario Luo MD FACS      Electronically signed by Solitario Luo MD at 04/25/23 2114       Vital Signs (last 3 days)     Date/Time Temp Temp src Pulse Resp BP Patient Position SpO2    04/27/23 0756 98.1 (36.7) Oral 71 16 120/63 Lying 97    04/27/23 0408 98 (36.7) Oral 72 16 122/71 Lying 97    04/27/23 0006 98.3 (36.8) Oral 76 16 104/53 Lying 99    04/26/23 1936 98.5 (36.9) Oral 74 16 110/54 Lying 100    04/26/23 1650 98 (36.7)  Oral 78 18 132/85 Sitting 99    04/26/23 1205 98.3 (36.8) Oral 66 18 143/68 Lying 100    04/26/23 0814 98 (36.7) Oral 69 18 122/62 Lying 98    04/26/23 0426 97.7 (36.5) Oral 69 18 114/61 Lying 98    04/26/23 0037 98.2 (36.8) Oral 86 18 129/59 Lying 96    04/25/23 2133 98.3 (36.8) Oral 76 20 128/74 Lying 100            Current Facility-Administered Medications   Medication Dose Route Frequency Provider Last Rate Last Admin   • Adult Standard Central TPN   Intravenous Q24H (TPN) Solitario Luo MD 25 mL/hr at 04/27/23 0650 Currently Infusing at 04/27/23 0650   • amoxicillin-clavulanate (AUGMENTIN) 875-125 MG per tablet 1 tablet  1 tablet Oral Q12H Ramandeep Malone MD   1 tablet at 04/27/23 1117   • dextrose 5 % and sodium chloride 0.9 % with KCl 40 mEq/L infusion  75 mL/hr Intravenous Continuous Ramandeep Malone MD 75 mL/hr at 04/27/23 1031 75 mL/hr at 04/27/23 1031   • iopamidol (ISOVUE-300) 61 % injection 50 mL  50 mL Oral Once in imaging Solitario Luo MD       • metoclopramide (REGLAN) tablet 10 mg  10 mg Oral TID AC Ramandeep Malone MD   10 mg at 04/27/23 1117   • ondansetron ODT (ZOFRAN-ODT) disintegrating tablet 8 mg  8 mg Oral Q8H Ramandeep Malone MD       • Pharmacy to Dose TPN   Does not apply Continuous PRN Solitario Luo MD       • sodium chloride 0.9 % flush 10 mL  10 mL Intravenous Q12H Solitario uLo MD   10 mL at 04/27/23 0800   • sodium chloride 0.9 % flush 10 mL  10 mL Intravenous PRN Solitario Luo MD       • sodium chloride 0.9 % flush 10 mL  10 mL Intravenous Q12H Solitario Luo MD   10 mL at 04/27/23 0800   • sodium chloride 0.9 % flush 10 mL  10 mL Intravenous Q12H Solitario Luo MD   10 mL at 04/27/23 0800   • sodium chloride 0.9 % flush 10 mL  10 mL Intravenous PRN Solitario Luo MD       • sodium chloride 0.9 % flush 20 mL  20 mL Intravenous PRN Solitario Luo MD       • sodium chloride 0.9 % infusion 40 mL  40 mL Intravenous PRN Solitario Luo MD       •  "sodium chloride 0.9 % infusion 40 mL  40 mL Intravenous PRN Solitario Luo MD       • thiamine (B-1) injection 50 mg  50 mg Intramuscular BID Solitario Luo MD   50 mg at 04/27/23 0800        Physician Progress Notes (last 72 hours)      Ramandeep Malone MD at 04/27/23 0713          4/27/2023  Santiago Mcgraw   1975    DATE OF ADMISSION:   LENGTH OF STAY:  4/25/2023     0 days    Subjective:     Discussed with Dr. Luo last night.  Pt's diet advanced.  Pt now on scheduled zofran and reglan instead of prn dosing.      He is doing very well.  He is sitting up in bed.  He tolerated regular diet last night.  We called his wife and spoke to her together.  They had questions about the CT findings of right middle lobe fluid and possible pneumonia.    He has no fever, chills or sweats.  Leukocytosis improved today.  No significant left shift initially.  White count today is 7.13.    He denies shortness of air or cough.      Objective:   /71 (BP Location: Right arm, Patient Position: Lying)   Pulse 72   Temp 98 °F (36.7 °C) (Oral)   Resp 16   Ht 182.9 cm (72\")   Wt (!) 149 kg (327 lb 6.4 oz)   SpO2 97%   BMI 44.40 kg/m²       Prior to Admission medications    Medication Sig Start Date End Date Taking? Authorizing Provider   buPROPion XL (WELLBUTRIN XL) 150 MG 24 hr tablet Take 1 tablet by mouth Every Morning. 12/5/22  Yes Germán Raza MD   metoclopramide (Reglan) 5 MG tablet Take 1 tablet by mouth Every Morning. 4/25/23  Yes Solitario Luo MD   NON FORMULARY Place 1 each on the skin as directed by provider Daily. Calcium patch (PatchAid)   Yes Germán Raza MD   NON FORMULARY Place 1 each on the skin as directed by provider Daily. Vitamin B-12 patch (PatchAid)   Yes Germán Raza MD   NON FORMULARY Place 1 each on the skin as directed by provider Daily. Multivitamin Patch (PatchAid)   Yes Germán Raza MD   ondansetron ODT (ZOFRAN-ODT) 4 MG disintegrating tablet " Place 1 tablet on the tongue Every 8 (Eight) Hours As Needed for Nausea or Vomiting.   Yes Germán Raza MD   pantoprazole (PROTONIX) 40 MG EC tablet Take 1 tablet by mouth Daily. 4/10/23  Yes Solitario Luo MD   potassium chloride (KLOR-CON) 20 MEQ CR tablet 2 tablets once daily x 10 days 4/25/23  Yes Francie Cabrera APRN   promethazine (PHENERGAN) 12.5 MG tablet Take 1 tablet by mouth Every 8 (Eight) Hours As Needed for Nausea or Vomiting.   Yes Provider, MD Germán      Current Facility-Administered Medications   Medication Dose Route Frequency Provider Last Rate Last Admin   • Adult Standard Central TPN   Intravenous Q24H (TPN) Solitario Luo MD 25 mL/hr at 04/27/23 0650 Currently Infusing at 04/27/23 0650   • dextrose 5 % and sodium chloride 0.9 % with KCl 40 mEq/L infusion  125 mL/hr Intravenous Continuous Ramandeep Malone  mL/hr at 04/27/23 0631 125 mL/hr at 04/27/23 0631   • iopamidol (ISOVUE-300) 61 % injection 50 mL  50 mL Oral Once in imaging Solitario Luo MD       • metoclopramide (REGLAN) injection 10 mg  10 mg Intravenous Q8H Ramandeep Malone MD   10 mg at 04/27/23 0408   • ondansetron (ZOFRAN) 8 mg/50 mL NS IVPB  8 mg Intravenous Q8H Ramandeep Malone MD   Stopped at 04/27/23 0430   • Pharmacy to Dose TPN   Does not apply Continuous PRN Solitario Luo MD       • sodium chloride 0.9 % flush 10 mL  10 mL Intravenous Q12H Solitario Luo MD   10 mL at 04/26/23 2031   • sodium chloride 0.9 % flush 10 mL  10 mL Intravenous PRN Solitario Luo MD       • sodium chloride 0.9 % flush 10 mL  10 mL Intravenous Q12H Solitario Luo MD   10 mL at 04/26/23 2031   • sodium chloride 0.9 % flush 10 mL  10 mL Intravenous Q12H Solitario Luo MD   10 mL at 04/26/23 2029   • sodium chloride 0.9 % flush 10 mL  10 mL Intravenous PRN Solitario Luo MD       • sodium chloride 0.9 % flush 20 mL  20 mL Intravenous PRN Solitario Luo MD       • sodium chloride 0.9 % infusion  40 mL  40 mL Intravenous PRN Solitario Luo MD       • sodium chloride 0.9 % infusion 40 mL  40 mL Intravenous PRN Solitario Luo MD       • thiamine (B-1) injection 50 mg  50 mg Intramuscular BID Solitario Luo MD   50 mg at 04/26/23 2030          General Appearance:  This is a 47 y.o. year old male in no acute distress.  HEENT: Normocephalic, atraumatic, pupils equal, round, reactive to light, Oral cavity clear  Neck: No adenopathy, thyromegaly or mass.   Chest: Good expansion, clear to auscultation bilaterally    Heart: Regular rate and rhythm. No murmurs, gallops or rubs  Abdomen: Soft, non-distended. Normal bowel sounds present throughout.  No tenderness, no guarding or rebound. No hepatosplenomegaly. No hernia.    Extremities: No edema.2+pulses peripherally  Skin: No rash or jaundice. No spider angiomata. No palmar erythema.  Neurologic:  Alert. Normal speech. Oriented. Nonfocal.  Psychiatric:  Normal affect.     Results from last 7 days   Lab Units 04/27/23  0602 04/25/23  1531   WBC 10*3/mm3 7.78 13.34*   HEMOGLOBIN g/dL 10.7* 12.6*   HEMATOCRIT % 33.5* 38.8   PLATELETS 10*3/mm3 205 230     Results from last 7 days   Lab Units 04/26/23  0449 04/25/23  1531 04/20/23  1535   SODIUM mmol/L 140 137  --    POTASSIUM mmol/L 3.2* 3.0* 3.4*   CHLORIDE mmol/L 101 98  --    CO2 mmol/L 28.0 28.0  --    BUN mg/dL 11 11  --    CREATININE mg/dL 0.99 1.12  --    CALCIUM mg/dL 8.6 9.2  --    BILIRUBIN mg/dL 0.7 0.9  --    ALK PHOS U/L 70 85  --    ALT (SGPT) U/L 15 19  --    AST (SGOT) U/L 11 14  --    GLUCOSE mg/dL 107* 110*  --          No results found for: LIPASE  TIBC   Date Value Ref Range Status   04/26/2023 152 (L) 298 - 536 mcg/dL Final     Vitamin B-12   Date Value Ref Range Status   04/25/2023 208 (L) 211 - 946 pg/mL Final     Folate   Date Value Ref Range Status   04/25/2023 2.74 (L) 4.78 - 24.20 ng/mL Final       STOOL OCCULT BLOOD  No results found for: OCCULTBLD     IMAGING:  XR Foot 3+ View  Left    Result Date: 4/13/2023  Narrative: EXAMINATION: XR FOOT 3+ VW LEFT-  4/13/2023 12:20 PM CDT  HISTORY: Severe left foot pain.  Left foot, 3 views.  Moderate calcaneal spurring.  Dorsal midfoot spurring.  Intact metatarsals and toes.  Summary: 1. Degenerative spurring. 2. No acute bony abnormality.  This report was finalized on 04/13/2023 12:32 by Dr. Benji Alvarez MD.    CT Abdomen Pelvis With Contrast    Result Date: 4/26/2023  Narrative: EXAMINATION: CT ABDOMEN PELVIS W CONTRAST- 4/26/2023 1:14 PM CDT  HISTORY: recurrent N/V post sleeve gastrectomy; R11.2-Nausea with vomiting, unspecified; K59.1-Functional diarrhea; E44.0-Moderate protein-calorie malnutrition; R11.2-Nausea with vomiting, unspecified  DOSE: 1909 mGycm (Automatic exposure control technique was implemented in an effort to keep the radiation dose as low as possible without compromising image quality)  REPORT: Spiral CT of the abdomen and pelvis was performed after administration of intravenous contrast from the lung bases through the pubic symphysis. Reconstructed coronal and sagittal images are also reviewed.  COMPARISON: There are no correlative imaging studies for comparison.  Review of lung windows demonstrates a focal area of patchy infiltrate and consolidation in the inferior aspect of the right middle lobe. This could be due to atelectasis or pneumonia. Clinical correlation is needed. No pleural effusion is identified. The liver and spleen are homogeneous without evidence of a mass. There is mild distention of the gallbladder. The pancreas and right adrenal gland are normal. There is a nodule within the left adrenal gland which contains macroscopic fat, the nodule measures 1.6 cm, most compatible with a benign myelolipoma. There is evidence of previous gastric sleeve surgery. The kidneys enhance normally, no renal mass or hydronephrosis is identified. The ureters are decompressed. There is poor distention of the urinary bladder, with  mild wall thickening which is probably artifactual. There is mild prostate enlargement. Loops of colon are normal caliber without wall thickening or mass effect. The appendix is normal. There are multiple mildly distended fluid-filled small bowel loops in the central left abdomen, the most distended loops measure in the range of 4.4 cm. There are some segments of small bowel in this region which show mild mucosal thickening and this may be related to jejunitis or potentially Crohn's disease with skip lesions. Low-grade partial small bowel obstruction is felt less likely and there is no discrete transition point. The ileum appears decompressed, though there are a few loops show mild mucosal thickening. No free fluid or free air is identified. No intra-abdominal abscess is identified. There is no intra-abdominal pelvic lymphadenopathy. There is mild fatty infiltration of the inguinal canals bilaterally. Review of bone windows shows evidence previous left hip fixation.      Impression: 1. Patchy infiltrate with partial consolidation of the right middle lobe, atelectasis versus pneumonia. Clinical correlation is recommended. 2. Evidence previous gastric sleeve surgery, there are are fluid filled distended small bowel loops in the central left abdomen, with segments of mild mucosal thickening, there also appears to be mild segmental mucosal thickening within some loops of the ileum, which is decompressed. Differential possibilities include inflammatory bowel disease with skip lesions associated with Crohn's, as well as nonspecific enteritis less likely partial small bowel obstruction, no transition point is identified. 3. Normal appendix. No free fluid, free air or intra-abdominal abscess. 4. Fat-containing nodule in the left adrenal gland measuring 1.6 cm, a small adrenal myelolipoma is most likely.   This report was finalized on 04/26/2023 13:39 by Dr. Johnson Puckett MD.    US Gallbladder    Result Date:  4/17/2023  Narrative: US GALLBLADDER-4/17/2023 9:11 PM CDT  REASON FOR EXAM: Nause/vomiting   COMPARISON: NONE  TECHNIQUE: Multiple longitudinal and transverse realtime sonographic images of the right upper quadrant of the abdomen are obtained.  FINDINGS:  Pancreas: Poorly visualized on this exam.  Liver: Normal in size, echogenicity and echotexture. Portal veins are patent with hepatopedal flow. Limited evaluation in terms of lesion assessment.  Gallbladder: Layering gallbladder sludge. Gallbladder is nondistended. Borderline abnormal wall thickening identified. No shadowing stones are seen. No pericholecystic fluid.  Bile ducts: The CBD measures 0.5 cm in diameter. There is no intrahepatic or extrahepatic ductal dilation.  Right kidney: Normal in size, shape and contour. No mass, hydronephrosis or calculi. No perinephric fluid collection.   Other: The visualized abdominal aorta is normal in caliber.      Impression: 1. No evidence of acute cholecystitis. 2. Layering gallbladder sludge with borderline abnormal wall thickening, perhaps chronic cholecystitis.   This report was finalized on 04/17/2023 21:47 by Dr aNbil Cohen, .    US Venous Doppler Lower Extremity Left (duplex)    Result Date: 4/13/2023  Narrative: History: Pain and swelling      Impression: Impression: There is no evidence of deep venous thrombosis or superficial thrombophlebitis of the left lower extremity.  Comments: Left lower extremity venous duplex exam was performed using color Doppler flow, Doppler wave form analysis, and grayscale imaging, with and without compression. There is no evidence of deep venous thrombosis of the common femoral, superficial femoral, popliteal, posterior tibial, and peroneal veins. There is no thrombus identified in the saphenofemoral junction or the greater saphenous vein.  This report was finalized on 04/13/2023 15:40 by Dr. Tolu Rios MD.    US Venous Doppler Lower Extremity Bilateral (duplex)    Result  Date: 4/26/2023  Narrative: History: Swelling      Impression: Impression: There is no evidence of deep venous thrombosis or superficial thrombophlebitis of right or left lower extremities.  Comments: Bilateral lower extremity venous duplex exam was performed using color Doppler flow, Doppler waveform analysis, and grayscale imaging, with and without compression. There is no evidence of deep venous thrombosis in the common femoral, superficial femoral, popliteal, peroneal, anterior tibial, and posterior tibial veins bilaterally. No thrombus is identified in the saphenofemoral junctions and greater saphenous veins bilaterally.   This report was finalized on 04/26/2023 16:23 by Dr. Tolu Rios MD.    FL Upper GI Single Contrast    Result Date: 4/19/2023  Narrative: FL UPPER GI SINGLE-CONTRAST- 4/19/2023 9:26 AM CDT  HISTORY: Assessment of upper GI system due to chronic nausea and vomiting. Prior sleeve gastrectomy.  COMPARISON: 4/17/2023  FLUOROSCOPY RADIATION TIME: 1.34 minutes FLUOROSCOPY DOSE: 184 mGy NUMBER OF IMAGES: 43  Findings:  Patient drank thin barium out of a cup and fluoroscopic images were obtained.  Normal esophageal motility. No esophageal filling defect or stricture. GE junction appears anatomically located. Contrast quickly flows through the GE junction into the stomach.  Contrast readily fills the stomach with postoperative change of sleeve gastrectomy noted. No gastric filling defect. No evidence of gastric contrast leak.  There is slight delayed emptying of the stomach but contrast does freely flow into the duodenum and into the jejunum.      Impression:  1.  No evidence of bowel obstruction or marked bowel luminal narrowing. No evidence of contrast leak. 2.  Contrast freely flows through the esophagus, into the stomach, and into the duodenum and jejunum (although there is slight delayed emptying of the stomach). This report was finalized on 04/19/2023 09:56 by Dr. Tom Gooden MD.    [place]  Upper GI Water Soluble    Result Date: 4/17/2023  Narrative: EXAMINATION: FL UPPER GI WATER SOLUBLE-  4/17/2023 9:49 AM CDT  HISTORY: GERD; K52.9-Noninfective gastroenteritis and colitis, unspecified; Z87.898-Personal history of other specified conditions; Z98.84-Bariatric surgery status  The fluoroscopy is performed and images of the upper changes are obtained during and after ingestion of water soluble contrast material/Gastrografin.  There is no previous study for comparison.  Patient is extremely nauseous and have a significant difficulty in initiating the swallowing with Gastrografin. Patient only able to take very small sips of the Gastrografin. The study, therefore, is of very limited diagnostic value.  No significant or obvious abnormality of the esophagus are noted. No obstruction or constriction. Normal peristalsis.  There is intermittently visualized small hiatal hernia. There is moderate spontaneous gastroesophageal reflux.  Postsurgical changes/deformity of the stomach is seen. The stomach is poorly evaluated due to lack of sufficient contrast in the stomach. No obvious leakage of the bolus. The mucosal pattern is not optimally visualized or evaluated.  The duodenal bulb is not optimally opacified or evaluated. The duodenal loop is not opacified.      Impression: 1. Limited diagnostic study due to patient's inability to swallow adequate amount of contrast material for optimal evaluation. 2. The esophagus is grossly normal. No intrinsic abnormality. 3. An intermittently visualized small hiatal hernia and a moderate spontaneous gastroesophageal reflux. 4. Stomach is not optimally visualized or evaluated. No obvious or significant leakage/extravasation. The duodenum is not optimally opacified or evaluated. 5. Fluoroscopy time: 2 minutes 13 seconds. 6. The dose: 251mGy. 7. Number of images: 62 This report was finalized on 04/17/2023 11:01 by Dr. Liliana Dan MD.       ASSESSMENT/PLAN:   1. Recurrent  nausea and vomiting  2. Post sleeve gastrectomy  3. Suspect post surgical delayed gastric emptying  4. Possible enteritis based on CT  5. Recent COVID infection approximately 3 weeks ago, no upper respiratory symptoms, just GI symptoms  6. Microlithiasis and borderline GB wall thickening  7. Acute diarrhea-resolved.  a. Suspect delayed gastric emptying, possible Covid induced enteritis with prolonged symptoms. Rec scheduled antiemetics and reglan for now, advance diet slowly.  We will convert antiemetics and Reglan to p.o. for a 24-hour trial.  If he does well and tolerating a diet, okay to discharge home. Would use reglan for no more than 2 weeks then d/c.  b. Patient only had diarrhea 2 occasions, once on Monday evening and another on Tuesday morning.  He has had no further episodes.  If diarrhea recurs, will send full infectious work-up including GI PCR panel.  8. Hypokalemia-I change his IV fluids yesterday to include potassium.  He is also receiving TPN.  His potassium is now normal.  No need for further potassium chloride rider and would avoid p.o. dosing as it may exacerbate nausea.  9. Right middle lobe infiltrate/asterixis  a. Suspected aspiration.  Patient without shortness of air or cough.  Start incentive spirometry.  I recommend Augmentin.  I went ahead and wrote that order.    RECOMMENDATIONS:   1. Continue current IVFs but decrease to 75 cc/h given TPN.  2. Continue TPN  for now  3. Continue scheduled zofran and reglan but change to p.o. dosing.  4. Limit reglan to only 2 weeks.  5. Advance diet slowly decreased  6. Recommend antibiotics to cover for probable right middle lobe aspiration pneumonia.  Augmentin x7 days.  7. Recommend incentive spirometry.  8. Recommend observation for 24 hours as we are converting to p.o. antiemetics and motility agent.    No further recs from GI.  Will s/o. Please call with any questions.    Ramandeep Malone MD, DENISE MURCIA  07:13 CDT    DISCLAIMER:    This physician  works through a [employer] as an inpatient consultant gastroenterologist only and has no outpatient clinic for patient follow up.  Any results not available at time of inpatient discharge and/or GI clinic follow up should be managed by the hospitalist team, PCP, or outpatient gastroenterologist.    Note to Patient:   The 21st Century Cures Act makes medical notes like this available to patients in the interest of transparency; however, please be advised this is a medical document.  It is intended as fxfp-ir-ldau communication.  It is written in medical language and may contain abbreviations or verbiage that are unfamiliar.  It may appear blunt or direct.  Medical documents are intended to carry relevant information, facts as evident, and the clinical opinion of the practitioner.  This note may have been transcribed using a voice dictation system.  Voice-recognition errors may occur.  This should not be taken to alter the content or meaning of this note.       Electronically signed by Ramandeep Malone MD at 04/27/23 1028     Ramandeep Malone MD at 04/26/23 1412        Brief GI note    CT abdomen pelvis p.o. and IV contrast completed.  I reviewed the images as well as the report.  The patient has some distended loops of bowel in the left mid abdomen with some thickened mucosal walls without mesenteric stranding or inflammation.  Mesenteric vasculature is patent including portal vein and SMV.  These findings could represent an enteritis, questionable postoperative issue.  There is no evidence of bowel obstruction and contrast passes to right colon.    Continue conservative care for now.  Doing an endoscopy will have very low yield and not supported given the CT findings.    GI will follow up tomorrow.    Electronically signed by Ramandeep Malone MD at 04/26/23 8711     Solitario Luo MD at 04/26/23 7327          Patient Care Team:  Angelo Mckinnon APRN as PCP - General (Family  Medicine)  Francie Cabrera APRN as Nurse Practitioner (Nurse Practitioner)    Subjective     Santiago Mcgraw is POD since february months ago from a laparoscopic sleeve gastrectomy procedure.  The patient approximately 1-1/2 months after his surgery began having issues with nausea and vomiting.  He also presented with fevers and chills per his history.  Since then he has been in and out of our hospital facility for hydration, potassium replacement, nausea control and monitoring.  Last admission he had an ultrasound which showed sludge in the gallbladder without cholecystitis.  Remainder of his LFTs progressively improved.  Throughout this time he also had 2 attacks of gout.  His most recent flareup is his left foot which is currently active and symptomatic.  His last admission was approximately 1 week ago and was tolerating p.o. intake however yesterday a.m. he admitted he woke up with significant vomiting and nausea, he also now complains of uncontrolled diarrhea.  This persisted again which prompted laboratory work and.  Fluids.  At this point there is concern of nutritional depletion primarily protein related due to poor oral and consistent intake of foods.  He was admitted given thiamine and B12 as well as IV fluids and potassium replacement.  He has tolerated oral intake today minimal amounts due to it not tasting good.  Minimal nausea per her statement at this time.       Review of Systems:     Review of Systems - General ROS: positive for  - fatigue and weight loss  Respiratory ROS: no cough, shortness of breath, or wheezing  Cardiovascular ROS: no chest pain or dyspnea on exertion  Gastrointestinal ROS: no abdominal pain, change in bowel habits, or black or bloody stools  Musculoskeletal ROS: positive for - gait disturbance and pain in foot - left    Objective     Vital Signs  /62 (BP Location: Right arm, Patient Position: Lying)   Pulse 69   Temp 98 °F (36.7 °C) (Oral)   Resp 18   Ht 182.9 cm  "(72\")   Wt (!) 147 kg (324 lb 12.8 oz)   SpO2 98%   BMI 44.05 kg/m²     Physical Exam:    HEENT: extra ocular movement intact and sclera clear, anicteric  Respiratory: appears well, vitals normal, no respiratory distress, acyanotic, normal RR, chest clear, no wheezing, crepitations, rhonchi, normal symmetric air entry  Cardiovascular: Regular rate and rhythm, S1, S2 normal, no murmur, click, rub or gallop  GI: Soft, non-tender, normal bowel sounds; no bruits, organomegaly or masses.  Abnormal shape: obese  Musculoskeletal: Left foot pain and gait disturbance due to not being able to walk on that foot  Neurologic: alert, oriented, normal speech, no focal findings or movement disorder noted     Results Review:    Labs reviewed    Medication Reviewed:   Current Facility-Administered Medications   Medication Dose Route Frequency Provider Last Rate Last Admin   • lactated ringers infusion  100 mL/hr Intravenous Continuous Solitario Luo  mL/hr at 04/26/23 0819 100 mL/hr at 04/26/23 0819   • ondansetron (ZOFRAN) injection 4 mg  4 mg Intravenous Q6H PRN Solitario Luo MD       • Pharmacy to Dose TPN   Does not apply Continuous PRN Solitario Luo MD       • potassium chloride (MICRO-K) CR capsule 40 mEq  40 mEq Oral PRN Solitario Luo MD       • sodium chloride 0.9 % flush 10 mL  10 mL Intravenous Q12H Solitario Luo MD   10 mL at 04/26/23 0819   • sodium chloride 0.9 % flush 10 mL  10 mL Intravenous PRN Solitario Luo MD       • sodium chloride 0.9 % infusion 40 mL  40 mL Intravenous PRN Solitario Luo MD       • thiamine (B-1) injection 100 mg  100 mg Intramuscular Daily Solitario Luo MD   100 mg at 04/26/23 0039       Assessment & Plan  POD 27 from the above surgery now with intermittent nausea and vomiting symptoms, dehydration, concerns of malnutrition.  Patient is stable however we had concerns of this chronic episode of intermittent nausea and vomiting.  This a.m. the patient is " tolerating p.o. without difficulty.  He did receive last night thiamine and B12 shot.  Labs have been ordered.  He tolerated the potassium oral supplementation/replacement.  He still has difficulties ambulating due to his gout in his left foot.  Family is requesting a second opinion which I agree with.  I have contacted [place] for possible patient transfer which is pending.  Talk to the transfer unit and awaiting return phone call.  In the meantime the patient will continue with our therapy which we will have gastroenterology consultation, PICC line placement and nutritional supplementation.  Patient is comfortable with this regimen and we continued care until transfer is possible.  Awaiting to discuss case with gastroenterology consultation provider.  Had the opportunity to talk with gastroenterologist Dr. Malone she will assess the patient as well and provide additional information if possible.      Solitario Luo MD  04/26/23  09:56 CDT      Electronically signed by Solitario Luo MD at 04/26/23 1037     Solitario Luo MD at 04/25/23 9424            Patient Care Team:  Angelo Mckinnon APRN as PCP - General (Family Medicine)  Francie Cabrera APRN as Nurse Practitioner (Nurse Practitioner)    Subjective     Santiago Mcgraw is a 47 y.o. male.     Santiago is post op approximately 2 months from his sleeve gastrectomy procedure.  He still has intermittent nausea and vomiting.  He is currently on his stage IV diet.  He stated yesterday he woke up with vomiting.  He had then remained nauseated throughout the day today.  He was able to tolerate his stage IV diet on Saturday and Sunday which included ground turkey, solids and vegetables.  He does not know what exacerbates his nausea or vomiting.  He now is fraught with diarrhea.  He has been having recurrent gout symptoms as well occurring between the right and left foot.  Subsequent questioning he disclosed that he had pain in his left thigh on the  medial aspect/location.        Review Of Systems:  General ROS: positive for  - fatigue  Respiratory ROS: no cough, shortness of breath, or wheezing  Cardiovascular ROS: no chest pain or dyspnea on exertion  Gastrointestinal ROS: no abdominal pain, change in bowel habits, or black or bloody stools  Positive for nausea and vomiting  Musculoskeletal ROS: positive for - muscle pain    The following portions of the patient's history were reviewed and updated as appropriate: allergies, current medications, past family history, past medical history, past social history, past surgical history and problem list.    Objective   There were no vitals taken for this visit.  There were no vitals filed for this visit.     General Appearance:  awake, alert, oriented, in no acute distress  Lungs:  Normal expansion.  Clear to auscultation.  No rales, rhonchi, or wheezing.  Heart:  Heart regular rate and rhythm  Abdomen:  Soft, non-tender, normal bowel sounds; no bruits, organomegaly or masses.  Abnormal shape: obese  Extremities: Right thigh tenderness in the medial aspect no gross swelling however there is pain presumably secondary to his gout in the left foot with some swelling.    ASSESSMENT/ PLAN    Encounter Diagnoses   Name Primary?   • Nausea and vomiting, unspecified vomiting type Yes   • Status post laparoscopic sleeve gastrectomy      The patient's intermittent nausea and vomiting is of a concern and I have explained to the patient that I will be obtaining a second opinion.  He does have a history of Micrell cholelithiasis however his symptoms beyond the nausea and intermittent vomiting are not consistent with acute cholecystitis.  He also has obtained labs per my recommendation.  He complained of numbness in his right lower quadrant location consistent with his incisional site.  My concern is this is more than likely due to the surgical site however I would want to rule out vitamin depletion or deficiency due to his poor  intake.  Of note the patient has been admittedly utilizing vitamin patches consistently but I will reassess this as well.  I will obtain a CBC and CMP.  The CBC has showed mild leukocytosis.  The patient does not complain of abdominal pain so I do not believe it is related to his gallbladder and probably more related to his gout which has reflared up again.   He is currently stable and will await a second opinion.  If his labs return with signs consistent with elevation of his LFTs then this would prompt more of a recent of the cause of his symptoms and I would be more confident in removing his gallbladder as the etiology/offending agent of his nausea.      04/25/23  21:14 CDT  Patient Care Team:  Angelo Mckinnon APRN as PCP - General (Family Medicine)  Francie Cabrera APRN as Nurse Practitioner (Nurse Practitioner)  Solitario Luo MD FACS      Electronically signed by Solitario Luo MD at 04/25/23 2114          Consult Notes (last 72 hours)      Ramandeep Malone MD at 04/26/23 0919      Consult Orders    1. Inpatient Gastroenterology Consult [[id number]] ordered by Solitario Luo MD at 04/25/23 2120 04/26/23  Santiago Mcgraw   1975    DATE OF ADMISSION:   LENGTH OF STAY:  4/25/2023     0 days    Consultation Regarding:     Evaluation and/or management for recurrent nausea and vomiting    REQUESTING PHYSICIAN:  Solitario Luo MD    HPI:     Santiago Mcgraw is an 47 y.o. male with past medical history that includes gastric sleeve surgery 2/23/23 for morbid obesity, BMI 44.05 currently, HTN, PAPITO on CPAP and joint pain presents with recurrent N/V that has been ongoing for 3 weeks now. He has lost about 64 lbs since the surgery according to the chart.  This is his 3rd admission for N/V.    Patient apparently did very well with progression of his diet right after the surgery but about 3 weeks ago developed an acute illness consistent of fever and severe fatigue with  associated recurrent nausea and vomiting.  He had no upper respiratory symptoms but tested himself at home for COVID and was positive.  He had difficulty tolerating p.o. diet for about 1 week and then the second week gradually began to tolerate some oral intake.  However, he and his wife state that he has had persistent nausea with difficulty and then only intermittent vomiting.  For instance on Friday he was able to eat some plant-based protein, 2 protein shakes, moderate oral liquid intake, and some carrots.  He did well on Saturday and was able to tolerate p.o. but then on Sunday woke up with significant nausea and vomiting.  He then had 2 episodes of diarrhea one occurring Monday evening and the other on Tuesday morning.A bowel movement after that had just been loose but more formed.  He denies ongoing diarrhea.      Pt currently on Stage IV diet. He was just discharged 4/20/23 for same symptoms, treated conservatively and discharged on reglan, Zofran, and PPI.  He tells me he was taking the Reglan scheduled twice daily as well as a PPI but has been on Zofran only as needed and has taken very little of it at home.    He presents again with recurrent N/V that occurred Monday 4/24/23. He had tolerated a Stage IV diet of turkey and vegetables Saturday and Sunday but work up Monday vomiting.  He is also having diarrhea. Denies F/C/NS, abd pain.    He has been in close contact with Dr. Luo via secure chat and then saw him in follow-up yesterday.  After labs revealed significant electrolyte disturbance, he was told to return and be admitted through the ER.    Patient has had no ongoing diarrhea since admission, he has had only mild nausea but no ongoing vomiting.  He has tolerated some juice this morning.  Denies abdominal pain.    Initial work-up included a water-soluble contrast upper GI series which was limited due to patient inability to swallow a large amount of contrast material.  The exam was suboptimal.   There was a visualized small hiatus hernia and moderate spontaneous reflux but the stomach was not adequately visualized.    This was followed up with a single contrast upper GI series with barium 4/19/2023 revealing no evidence of bowel obstruction or marked bowel luminal narrowing and no evidence of contrast leak at the sleeve site.  There was free flow of contrast through the esophagus and into the stomach and into the duodenum and jejunum but there was slight delayed emptying of the stomach.    Labs reveal normal LFTs multiple times, and only mild leukocytosis with a white count of 13.34 today.  No left shift.    According to him and his wife, a transfer request has been placed to [place].    Past Medical History:   Diagnosis Date   • Hypertension    • Obesity    • Obstructive sleep apnea treated with continuous positive airway pressure (CPAP)    • Pain     back and joint   • Personal history of COVID-19 05/2022   • Personal history of COVID-19 01/2023       Past Surgical History:   Procedure Laterality Date   • ENDOSCOPY N/A 01/06/2023    Procedure: ESOPHAGOGASTRODUODENOSCOPY WITH ANESTHESIA;  Surgeon: Solitario Luo MD;  Location[facility] ENDOSCOPY;  Service: General;  Laterality: N/A;  pre morbid obesity  post  miriam fiessinger aprn   • GASTRIC SLEEVE LAPAROSCOPIC N/A 2/23/2023    Procedure: GASTRIC SLEEVE LAPAROSCOPIC WITH DAVINCI ROBOT;  Surgeon: Solitario Luo MD;  Location: [facility] OR;  Service: Robotics - DaVinci;  Laterality: N/A;   • HIP SURGERY Left 1986       Allergies:   Adhesive tape and Latex    Prior to Admission medications    Medication Sig Start Date End Date Taking? Authorizing Provider   buPROPion XL (WELLBUTRIN XL) 150 MG 24 hr tablet Take 1 tablet by mouth Every Morning. 12/5/22   Provider, MD Germán   metoclopramide (Reglan) 5 MG tablet Take 1 tablet by mouth Every Morning. 4/25/23   Solitario Luo MD   NON FORMULARY Place 1 each on the skin as directed by provider Daily.  Calcium patch (PatchAid)    Germán Raza MD   NON FORMULARY Place 1 each on the skin as directed by provider Daily. Vitamin B-12 patch (PatchAid)    Germán Raza MD   NON FORMULARY Place 1 each on the skin as directed by provider Daily. Multivitamin Patch (PatchAid)    Germán Raza MD   ondansetron ODT (ZOFRAN-ODT) 4 MG disintegrating tablet Place 1 tablet on the tongue Every 8 (Eight) Hours As Needed for Nausea or Vomiting.    Germán Raza MD   pantoprazole (PROTONIX) 40 MG EC tablet Take 1 tablet by mouth Daily. 4/10/23   Solitario Luo MD   potassium chloride (KLOR-CON) 20 MEQ CR tablet 2 tablets once daily x 10 days 4/25/23   Francie Cabrera APRN      Current Facility-Administered Medications   Medication Dose Route Frequency Provider Last Rate Last Admin   • lactated ringers infusion  100 mL/hr Intravenous Continuous Solitario Luo  mL/hr at 04/26/23 0819 100 mL/hr at 04/26/23 0819   • ondansetron (ZOFRAN) injection 4 mg  4 mg Intravenous Q6H PRN Solitario Luo MD       • Pharmacy to Dose TPN   Does not apply Continuous PRN Solitario Luo MD       • potassium chloride (MICRO-K) CR capsule 40 mEq  40 mEq Oral PRN Solitario Luo MD       • sodium chloride 0.9 % flush 10 mL  10 mL Intravenous Q12H Solitario Luo MD   10 mL at 04/26/23 0819   • sodium chloride 0.9 % flush 10 mL  10 mL Intravenous PRN Solitario Luo MD       • sodium chloride 0.9 % infusion 40 mL  40 mL Intravenous PRN Solitario Luo MD       • thiamine (B-1) injection 100 mg  100 mg Intramuscular Daily Solitario Luo MD   100 mg at 04/26/23 0039        Social History     Socioeconomic History   • Marital status:    Tobacco Use   • Smoking status: Never   • Smokeless tobacco: Never   Vaping Use   • Vaping Use: Never used   Substance and Sexual Activity   • Alcohol use: Never   • Drug use: Never   • Sexual activity: Defer       Family History:  family history includes  "Arthritis in his brother, father, maternal grandmother, mother, and paternal grandmother; Cancer in his maternal grandfather; Diabetes in his father; Hypertension in his father, maternal grandfather, maternal grandmother, mother, paternal grandfather, and paternal grandmother; Obesity in his brother, father, mother, paternal grandfather, and paternal grandmother; Stroke in his paternal grandfather.    Review of Systems:   The 12 point review of systems is otherwise unremarkable except for what is stated in the HPI.    Physical Examination:     Vital signs:   /62 (BP Location: Right arm, Patient Position: Lying)   Pulse 69   Temp 98 °F (36.7 °C) (Oral)   Resp 18   Ht 182.9 cm (72\")   Wt (!) 147 kg (324 lb 12.8 oz)   SpO2 98%   BMI 44.05 kg/m²     General Appearance:  This is a 47 y.o. year old male in no acute distress.  HEENT: Normocephalic, atraumatic, pupils equal, round, reactive to light, Oral cavity clear  Neck: No adenopathy, thyromegaly or mass.   Chest: Good expansion, clear to auscultation bilaterally    Heart: Regular rate and rhythm. No murmurs, gallops or rubs  Abdomen: Soft, non-distended. Normal bowel sounds present throughout.  No tenderness, no guarding or rebound. No hepatosplenomegaly. No hernia.    Extremities: No edema.2+pulses peripherally  Skin: No rash or jaundice. No spider angiomata. No palmar erythema.  Neurologic:  Alert. Normal speech. Oriented. Nonfocal.  Psychiatric:  Normal affect.     Diagnostic Studies     Results from last 7 days   Lab Units 04/25/23  1531 04/20/23  0526   WBC 10*3/mm3 13.34* 6.64   HEMOGLOBIN g/dL 12.6* 12.1*   HEMATOCRIT % 38.8 37.9   PLATELETS 10*3/mm3 230 184     Results from last 7 days   Lab Units 04/26/23  0449 04/25/23  1531 04/20/23  1535 04/20/23  0526   SODIUM mmol/L 140 137  --  140   POTASSIUM mmol/L 3.2* 3.0* 3.4* 3.0*   CHLORIDE mmol/L 101 98  --  102   CO2 mmol/L 28.0 28.0  --  27.0   BUN mg/dL 11 11  --  13   CREATININE mg/dL 0.99 1.12  " --  1.06   CALCIUM mg/dL 8.6 9.2  --  8.6   BILIRUBIN mg/dL 0.7 0.9  --  0.8   ALK PHOS U/L 70 85  --  76   ALT (SGPT) U/L 15 19  --  32   AST (SGOT) U/L 11 14  --  19   GLUCOSE mg/dL 107* 110*  --  116*         No results found for: LIPASE    STOOL OCCULT BLOOD  No results found for: OCCULTBLD     IMAGING:  XR Foot 3+ View Left    Result Date: 4/13/2023  Narrative: EXAMINATION: XR FOOT 3+ VW LEFT-  4/13/2023 12:20 PM CDT  HISTORY: Severe left foot pain.  Left foot, 3 views.  Moderate calcaneal spurring.  Dorsal midfoot spurring.  Intact metatarsals and toes.  Summary: 1. Degenerative spurring. 2. No acute bony abnormality.  This report was finalized on 04/13/2023 12:32 by Dr. Benji Alvarez MD.    US Gallbladder    Result Date: 4/17/2023  Narrative: US GALLBLADDER-4/17/2023 9:11 PM CDT  REASON FOR EXAM: Nause/vomiting   COMPARISON: NONE  TECHNIQUE: Multiple longitudinal and transverse realtime sonographic images of the right upper quadrant of the abdomen are obtained.  FINDINGS:  Pancreas: Poorly visualized on this exam.  Liver: Normal in size, echogenicity and echotexture. Portal veins are patent with hepatopedal flow. Limited evaluation in terms of lesion assessment.  Gallbladder: Layering gallbladder sludge. Gallbladder is nondistended. Borderline abnormal wall thickening identified. No shadowing stones are seen. No pericholecystic fluid.  Bile ducts: The CBD measures 0.5 cm in diameter. There is no intrahepatic or extrahepatic ductal dilation.  Right kidney: Normal in size, shape and contour. No mass, hydronephrosis or calculi. No perinephric fluid collection.   Other: The visualized abdominal aorta is normal in caliber.      Impression: 1. No evidence of acute cholecystitis. 2. Layering gallbladder sludge with borderline abnormal wall thickening, perhaps chronic cholecystitis.   This report was finalized on 04/17/2023 21:47 by Dr Nabil Cohen, .    US Venous Doppler Lower Extremity Left (duplex)    Result  Date: 4/13/2023  Narrative: History: Pain and swelling      Impression: Impression: There is no evidence of deep venous thrombosis or superficial thrombophlebitis of the left lower extremity.  Comments: Left lower extremity venous duplex exam was performed using color Doppler flow, Doppler wave form analysis, and grayscale imaging, with and without compression. There is no evidence of deep venous thrombosis of the common femoral, superficial femoral, popliteal, posterior tibial, and peroneal veins. There is no thrombus identified in the saphenofemoral junction or the greater saphenous vein.  This report was finalized on 04/13/2023 15:40 by Dr. Tolu Rios MD.    FL Upper GI Single Contrast    Result Date: 4/19/2023  Narrative: FL UPPER GI SINGLE-CONTRAST- 4/19/2023 9:26 AM CDT  HISTORY: Assessment of upper GI system due to chronic nausea and vomiting. Prior sleeve gastrectomy.  COMPARISON: 4/17/2023  FLUOROSCOPY RADIATION TIME: 1.34 minutes FLUOROSCOPY DOSE: 184 mGy NUMBER OF IMAGES: 43  Findings:  Patient drank thin barium out of a cup and fluoroscopic images were obtained.  Normal esophageal motility. No esophageal filling defect or stricture. GE junction appears anatomically located. Contrast quickly flows through the GE junction into the stomach.  Contrast readily fills the stomach with postoperative change of sleeve gastrectomy noted. No gastric filling defect. No evidence of gastric contrast leak.  There is slight delayed emptying of the stomach but contrast does freely flow into the duodenum and into the jejunum.      Impression:  1.  No evidence of bowel obstruction or marked bowel luminal narrowing. No evidence of contrast leak. 2.  Contrast freely flows through the esophagus, into the stomach, and into the duodenum and jejunum (although there is slight delayed emptying of the stomach). This report was finalized on 04/19/2023 09:56 by Dr. Tom Gooden MD.    FL Upper GI Water Soluble    Result Date:  4/17/2023  Narrative: EXAMINATION: FL UPPER GI WATER SOLUBLE-  4/17/2023 9:49 AM CDT  HISTORY: GERD; K52.9-Noninfective gastroenteritis and colitis, unspecified; Z87.898-Personal history of other specified conditions; Z98.84-Bariatric surgery status  The fluoroscopy is performed and images of the upper changes are obtained during and after ingestion of water soluble contrast material/Gastrografin.  There is no previous study for comparison.  Patient is extremely nauseous and have a significant difficulty in initiating the swallowing with Gastrografin. Patient only able to take very small sips of the Gastrografin. The study, therefore, is of very limited diagnostic value.  No significant or obvious abnormality of the esophagus are noted. No obstruction or constriction. Normal peristalsis.  There is intermittently visualized small hiatal hernia. There is moderate spontaneous gastroesophageal reflux.  Postsurgical changes/deformity of the stomach is seen. The stomach is poorly evaluated due to lack of sufficient contrast in the stomach. No obvious leakage of the bolus. The mucosal pattern is not optimally visualized or evaluated.  The duodenal bulb is not optimally opacified or evaluated. The duodenal loop is not opacified.      Impression: 1. Limited diagnostic study due to patient's inability to swallow adequate amount of contrast material for optimal evaluation. 2. The esophagus is grossly normal. No intrinsic abnormality. 3. An intermittently visualized small hiatal hernia and a moderate spontaneous gastroesophageal reflux. 4. Stomach is not optimally visualized or evaluated. No obvious or significant leakage/extravasation. The duodenum is not optimally opacified or evaluated. 5. Fluoroscopy time: 2 minutes 13 seconds. 6. The dose: 251mGy. 7. Number of images: 62 This report was finalized on 04/17/2023 11:01 by Dr. Liliana Dan MD.       ASSESSMENT/PLAN:   9. Recurrent nausea and vomiting  10. Post sleeve  gastrectomy  11. Suspect delayed gastric emptying  12. Microlithiasis and borderline GB wall thickening  13. Acute diarrhea  a. His symptomatology is most consistent with delayed gastric emptying which could have been brought on by acute COVID infection exacerbating postsurgical gastroparesis.  Recommend CT abdomen pelvis with p.o. and IV contrast to rule out any other etiologies.  Recommend adjustment of medications including scheduled antiemetics and scheduled Reglan for now.  I think performing EGD has low yield.  After further discussion, patient and wife are in agreement that this is not necessary.  I do not think the symptoms are due to biliary etiology.  b. Patient only had diarrhea 2 occasions, once on Monday evening and another on Tuesday morning.  He has had no further episodes.  If diarrhea persist, will send full infectious work-up including GI PCR panel and C. difficile.      RECOMMENDATIONS:   1. Change Zofran to scheduled dosing 8 mg every 8 hours  2. Add Reglan 10 mg IV scheduled every 8 hours  3. Advance diet slowly  4. CT abdomen pelvis with p.o. and IV contrast  5. Change maintenance IV fluids to include potassium.    I will follow up tomorrow.  He may be transferred to [place] per Dr. Luo.    Thank you for this referral. Please call with any questions.    Ramandeep Malone MD, DENISE MURCIA  09:19 CDT    DISCLAIMER:    This physician works through a [employer] as an inpatient consultant gastroenterologist only and has no outpatient clinic for patient follow up.  Any results not available at time of inpatient discharge and/or GI clinic follow up should be managed by the hospitalist team, PCP, or outpatient gastroenterologist.    Note to Patient:   The 21st Century Cures Act makes medical notes like this available to patients in the interest of transparency; however, please be advised this is a medical document.  It is intended as [id number]-dn-twih communication.  It is written in medical  language and may contain abbreviations or verbiage that are unfamiliar.  It may appear blunt or direct.  Medical documents are intended to carry relevant information, facts as evident, and the clinical opinion of the practitioner.  This note may have been transcribed using a voice dictation system.  Voice-recognition errors may occur.  This should not be taken to alter the content or meaning of this note.    Electronically signed by Ramandeep Malone MD at 04/26/23 1132     Admitted to  for dehydration post gastric sleeve x 2 months.  IV infusing without problems.     4/26 RD nutrition assessment completed.  TPN recommendations in progress notes, and pharmacy notified.  Pt is currently receiving a Bariatric Stage 5 diet, and consumed 100% at breakfast this morning.  For possible transfer to [place].  Will continue to monitor po intake and TPN tolerance, as well as follow for further d/c needs    CT abdomen pelvis p.o. and IV contrast completed.  I reviewed the images as well as the report.  The patient has some distended loops of bowel in the left mid abdomen with some thickened mucosal walls without mesenteric stranding or inflammation.  Mesenteric vasculature is patent including portal vein and SMV.  These findings could represent an enteritis, questionable postoperative issue.  There is no evidence of bowel obstruction and contrast passes to right colon.     Continue conservative care for now.  Doing an endoscopy will have very low yield and not supported given the CT findings.    Pt with no complaints of pain so far during shift. IVF, TPN, and Lipids infusing through PICC line. Scheduled Reglan and Zofran given per order. Up ad terrence. Voiding. Stage 5 bariatric diet. SCDs on. VSS. Safety maintained.

## 2023-04-27 NOTE — NURSING NOTE
PICC line dressing changed with SARAH Irwin. Sterile technique maintained. Patient tolerated well.

## 2023-04-28 LAB
ALBUMIN SERPL-MCNC: 2.9 G/DL (ref 3.5–5.2)
ALBUMIN/GLOB SERPL: 1.3 G/DL
ALP SERPL-CCNC: 56 U/L (ref 39–117)
ALT SERPL W P-5'-P-CCNC: 13 U/L (ref 1–41)
ANION GAP SERPL CALCULATED.3IONS-SCNC: 12 MMOL/L (ref 5–15)
AST SERPL-CCNC: 8 U/L (ref 1–40)
BILIRUB SERPL-MCNC: 0.3 MG/DL (ref 0–1.2)
BUN SERPL-MCNC: 8 MG/DL (ref 6–20)
BUN/CREAT SERPL: 8.4 (ref 7–25)
CALCIUM SPEC-SCNC: 8.4 MG/DL (ref 8.6–10.5)
CHLORIDE SERPL-SCNC: 104 MMOL/L (ref 98–107)
CHOLEST SERPL-MCNC: 77 MG/DL (ref 0–200)
CO2 SERPL-SCNC: 23 MMOL/L (ref 22–29)
CREAT SERPL-MCNC: 0.95 MG/DL (ref 0.76–1.27)
EGFRCR SERPLBLD CKD-EPI 2021: 98.7 ML/MIN/1.73
FOLATE SERPL-MCNC: 2.57 NG/ML (ref 4.78–24.2)
GLOBULIN UR ELPH-MCNC: 2.3 GM/DL
GLUCOSE SERPL-MCNC: 119 MG/DL (ref 65–99)
HDLC SERPL-MCNC: 22 MG/DL (ref 40–60)
LDLC SERPL CALC-MCNC: 32 MG/DL (ref 0–100)
LDLC/HDLC SERPL: 1.33 {RATIO}
POTASSIUM SERPL-SCNC: 4 MMOL/L (ref 3.5–5.2)
PROT SERPL-MCNC: 5.2 G/DL (ref 6–8.5)
PYRIDOXAL PHOS SERPL-MCNC: 10.5 UG/L (ref 3.4–65.2)
SODIUM SERPL-SCNC: 139 MMOL/L (ref 136–145)
TRIGL SERPL-MCNC: 129 MG/DL (ref 0–150)
VIT B12 BLD-MCNC: 613 PG/ML (ref 211–946)
VLDLC SERPL-MCNC: 23 MG/DL (ref 5–40)

## 2023-04-28 PROCEDURE — 80053 COMPREHEN METABOLIC PANEL: CPT | Performed by: SURGERY

## 2023-04-28 PROCEDURE — 80061 LIPID PANEL: CPT | Performed by: SURGERY

## 2023-04-28 PROCEDURE — 84425 ASSAY OF VITAMIN B-1: CPT | Performed by: SURGERY

## 2023-04-28 PROCEDURE — 96372 THER/PROPH/DIAG INJ SC/IM: CPT

## 2023-04-28 PROCEDURE — 63710000001 ONDANSETRON PER 8 MG: Performed by: INTERNAL MEDICINE

## 2023-04-28 PROCEDURE — 96361 HYDRATE IV INFUSION ADD-ON: CPT

## 2023-04-28 PROCEDURE — 82746 ASSAY OF FOLIC ACID SERUM: CPT | Performed by: SURGERY

## 2023-04-28 PROCEDURE — 96368 THER/DIAG CONCURRENT INF: CPT

## 2023-04-28 PROCEDURE — 84550 ASSAY OF BLOOD/URIC ACID: CPT | Performed by: INTERNAL MEDICINE

## 2023-04-28 PROCEDURE — G0378 HOSPITAL OBSERVATION PER HR: HCPCS

## 2023-04-28 PROCEDURE — 82607 VITAMIN B-12: CPT | Performed by: SURGERY

## 2023-04-28 PROCEDURE — 96366 THER/PROPH/DIAG IV INF ADDON: CPT

## 2023-04-28 PROCEDURE — 99024 POSTOP FOLLOW-UP VISIT: CPT | Performed by: NURSE PRACTITIONER

## 2023-04-28 PROCEDURE — 25010000002 THIAMINE PER 100 MG: Performed by: SURGERY

## 2023-04-28 RX ADMIN — METOCLOPRAMIDE 10 MG: 10 TABLET ORAL at 08:58

## 2023-04-28 RX ADMIN — Medication 10 ML: at 20:37

## 2023-04-28 RX ADMIN — Medication 10 ML: at 08:59

## 2023-04-28 RX ADMIN — AMOXICILLIN AND CLAVULANATE POTASSIUM 1 TABLET: 875; 125 TABLET, FILM COATED ORAL at 20:36

## 2023-04-28 RX ADMIN — ONDANSETRON 8 MG: 8 TABLET, ORALLY DISINTEGRATING ORAL at 22:30

## 2023-04-28 RX ADMIN — METOCLOPRAMIDE 10 MG: 10 TABLET ORAL at 11:36

## 2023-04-28 RX ADMIN — AMOXICILLIN AND CLAVULANATE POTASSIUM 1 TABLET: 875; 125 TABLET, FILM COATED ORAL at 08:58

## 2023-04-28 RX ADMIN — ASCORBIC ACID, VITAMIN A PALMITATE, CHOLECALCIFEROL, THIAMINE HYDROCHLORIDE, RIBOFLAVIN-5 PHOSPHATE SODIUM, PYRIDOXINE HYDROCHLORIDE, NIACINAMIDE, DEXPANTHENOL, ALPHA-TOCOPHEROL ACETATE, VITAMIN K1, FOLIC ACID, BIOTIN, CYANOCOBALAMIN: 200; 3300; 200; 6; 3.6; 6; 40; 15; 10; 150; 600; 60; 5 INJECTION, SOLUTION INTRAVENOUS at 17:42

## 2023-04-28 RX ADMIN — POTASSIUM CHLORIDE, DEXTROSE MONOHYDRATE AND SODIUM CHLORIDE 75 ML/HR: 300; 5; 900 INJECTION, SOLUTION INTRAVENOUS at 17:42

## 2023-04-28 RX ADMIN — THIAMINE HYDROCHLORIDE 50 MG: 100 INJECTION, SOLUTION INTRAMUSCULAR; INTRAVENOUS at 20:36

## 2023-04-28 RX ADMIN — POTASSIUM CHLORIDE, DEXTROSE MONOHYDRATE AND SODIUM CHLORIDE 75 ML/HR: 300; 5; 900 INJECTION, SOLUTION INTRAVENOUS at 05:53

## 2023-04-28 RX ADMIN — METOCLOPRAMIDE 10 MG: 10 TABLET ORAL at 17:42

## 2023-04-28 RX ADMIN — THIAMINE HYDROCHLORIDE 50 MG: 100 INJECTION, SOLUTION INTRAMUSCULAR; INTRAVENOUS at 08:58

## 2023-04-28 RX ADMIN — ONDANSETRON 8 MG: 8 TABLET, ORALLY DISINTEGRATING ORAL at 14:28

## 2023-04-28 RX ADMIN — I.V. FAT EMULSION 50 G: 20 EMULSION INTRAVENOUS at 17:41

## 2023-04-28 RX ADMIN — ONDANSETRON 8 MG: 8 TABLET, ORALLY DISINTEGRATING ORAL at 05:21

## 2023-04-28 NOTE — CASE MANAGEMENT/SOCIAL WORK
Continued Stay Note  [facility]     Patient Name: Santiago Mcgraw  MRN: [id number]  Today's Date: 4/28/2023    Admit Date: 4/25/2023    Plan: Home   Discharge Plan     Row Name 04/28/23 1202       Plan    Plan Home    Plan Comments Spoke with Anel at [facility] [phone] and she says they cannot provide TPN for the [facility]. Orders now sent to [facility] at [phone]. Noted that pt will actually be on PPN so have new orders and they have been sent.               Discharge Codes    No documentation.               Expected Discharge Date and Time     Expected Discharge Date Expected Discharge Time    Apr 28, 2023             ARAVIND Chu

## 2023-04-28 NOTE — PROGRESS NOTES
"Pharmacy Parenteral Nutrition Daily Evaluation    Santiago Mcgraw is a  47 y.o. male that pharmacy has been consulted for TPN for post-op laparoscopic sleeve gastrectomy with continued nausea/vomiting, concern for nutritional depletion of protein per Dr. Luo' request.  [Ht: 182.9 cm (72\"); Wt: (!) 146 kg (322 lb 6.4 oz)]  Admission Date: 425      Subjective:  Progress notes reviewed.    Objective:  Temp (24hrs), Av.1 °F (36.7 °C), Min:97.9 °F (36.6 °C), Max:98.2 °F (36.8 °C)    Vent Settings       Results from last 7 days   Lab Units 23  0434 23  0813 23  0449 23  2130   SODIUM mmol/L 139 139 140  --    POTASSIUM mmol/L 4.0 4.0 3.2*  --    CHLORIDE mmol/L 104 105 101  --    MAGNESIUM mg/dL  --   --  1.8 1.8   PHOSPHORUS mg/dL  --   --  3.6  --    CO2 mmol/L 23.0 25.0 28.0  --    TOTAL PROTEIN g/dL 5.2* 5.4* 5.8*  --    BUN mg/dL 8 9 11  --    CREATININE mg/dL 0.95 0.98 0.99  --    CALCIUM mg/dL 8.4* 8.3* 8.6  --    PREALBUMIN mg/dL  --  9.3*  --   --    ALBUMIN g/dL 2.9* 2.9* 3.2*  --    BILIRUBIN mg/dL 0.3 0.4 0.7  --    ALK PHOS U/L 56 62 70  --    ALT (SGPT) U/L 13 14 15  --    AST (SGOT) U/L 8 7 11  --    GLUCOSE mg/dL 119* 119* 107*  --        Triglycerides   Date Value Ref Range Status   2023 129 0 - 150 mg/dL Final       Corrected Ca (Calcium + (0.8 * (4-albumin)) = 9.28    Results from last 7 days   Lab Units 23  0813 23  0602 23  1531   WBC 10*3/mm3 7.13 7.78 13.34*   HEMOGLOBIN g/dL 10.2* 10.7* 12.6*   HEMATOCRIT % 32.2* 33.5* 38.8   PLATELETS 10*3/mm3 185 205 230       estimated creatinine clearance is 141.2 mL/min (by C-G formula based on SCr of 0.95 mg/dL).    Intake & Output (last 3 days)        07    P.O. 550 600 480     I.V. (mL/kg) 800 (5.4) 3488.1 (23.4) 1942.7 (13.3)     Total Intake(mL/kg) 1350 (9.2) 4088.1 (27.4) 2422.7 (16.6)     Net +1350 +4088.1 " +2422.7             Urine Unmeasured Occurrence  2 x 7 x 2 x    Stool Unmeasured Occurrence  1 x            Above labs reviewed.     Dietitian Recommendations  Diet Orders (active) (From admission, onward)     Start     Ordered    04/28/23 1150  Diet: Gastrointestinal Diets; Fat-Restricted, Low Irritant, Lactose-Controlled, Fiber-Restricted; Bariatric Stage 5, No Straw; Texture: Regular Texture (IDDSI 7); Fluid Consistency: Thin (IDDSI 0)  Diet Effective Now         04/28/23 1149    04/27/23 1600  DIET MESSAGE Please send Orange Juice on every tray (TID) and one now. HILDA Jimenes  3 Times Daily      Comments: Please send Orange Juice on every tray (TID) and one now. HILDA Jimenes    04/27/23 1026                Current TPN Regimen Recommendation:  Dextrose 15% / Amino Acid 5% at goal rate of 65 mL/hr.  20% Lipid Emulsion 250 mL every 24 hours.    Daily Assessment:  TPN Therapy Day 3    TPN Medication Recent History (Show up to 4 orders; newest on the left. Changes between the two most recent orders are indicated.)     Start date and time    04/27/2023 1800  04/26/2023 1800      Adult Standard [facility] TPN [[id number]] Adult Standard Central TPN [[id number]]    Order Status  Last Dose in Progress Completed    Last Admin  New Bag at 04/27/2023 1728 by Esther Plummer, RN Currently Infusing at 04/27/2023 0650 by Aliza Melgar, RN    Frequency  Q24H (TPN) Q24H (TPN)       Additives    multivitamin (adult)  -- 10 mL    Trace Minerals Cu-Mn-Se-Zn  -- 1 mL       Amino Acids in Dextrose Premix    amino acids 5% - electrolytes in dextrose 15%  2,000 mL 2,000 mL       Energy Contribution    Proteins  400 kcal 400 kcal    Dextrose  1,020 kcal 1,020 kcal    Lipids  -- --    Total  1,420 kcal 1,420 kcal       Electrolyte Ion Calculated Amount    Sodium  70 mEq 70 mEq    Potassium  60 mEq 60 mEq    Calcium  9 mEq 9 mEq    Magnesium  10 mEq 10 mEq    Aluminum  -- --    Phosphate  30 mmol 30 mmol    Chloride  78 mEq 78 mEq     Acetate  160 mEq 160 mEq    Chloride: Acetate Ratio  0.49 0.49       Other    Total Amino Acid  100 g 100 g    Total Amino Acid/kg  0.67 g/kg 0.68 g/kg    Glucose Infusion Rate  1.68 mg/kg/min 0.42 mg/kg/min    Osmolarity  1,399 1,391.35    Volume  2,000 mL 2,011 mL    Rate  100 mL/hr 25 mL/hr    Dosing Weight  149 kg 147 kg    Infusion [facility]  [facility]           Additional insulin administration while previous TPN infusin units  Additional electrolyte administration while previous TPN infusing: D5NS with 40 mEq/L at 75ml/hr    Plan (Since pt is eating well, recommend to decrease current TPN of D15% Aa5% to 65mL/hour with lipids to cont daily):  1. TPN Formula:     Clinimix 5/15 E (1560 mL/day)   Lipids: 250 mL/day over 12 hours   Volume: 1560 mL (65 mL/hr over 24 hrs daily)    Based on the above labs, will add the following electrolytes/additives to the TPN only on MWF                             Multivitamin 10 mL              Trace Elements 1 mL    2. TPN will provide:   Lipids: 500 kcal   Protein: 78 g = 312 kcal  Dextrose: 234 g =  795.6 kcal   Total: 1607.6 kcal    3. Electrolyte replacements can be ordered as needed.   4. Pharmacy will continue to follow closely and make adjustments to TPN as necessary.    Tom García, PharmD  2023  12:48 CDT

## 2023-04-28 NOTE — PROGRESS NOTES
Checked in with patient and he denies nausea at this time.  He states that he is eating a whole banana as well as some other solid foods and has tolerated it well.  He states that he feels significantly better.  At this time social work is working towards having PPN at home and once this is set up he will be discharged by Dr. Luo.  Patient questioned [place] paperwork.  He has sent this to our office and I have advised that we would complete paperwork for him.  Patient has been advised to contact our office with any questions or concerns, worsening nausea or vomiting or diarrhea.  He verbalizes understanding.

## 2023-04-28 NOTE — PROGRESS NOTES
Nutrition Services    Patient Name:  Santiago Mcgraw  YOB: 1975  MRN: [id number]  Admit Date:  4/25/2023    Pt cont on a bariatric stage 5 diet. He has been tolerating diet. He has consumed 88% of the last 4 meals.    Per Dr. Luo's progress notes yesterday, he wants to cont with TPN while in the hospital and he wants pt to go home with PPN. Since pt is eating well, recommend to decrease current TPN of D15% Aa5% to 65mL/hour with lipids to cont daily.     For home PPN, recommend: Dextrose 5%, Amino acids 4.25% at 75mL/hour with multivitamins and trace minerals added.    Electronically signed by:  Nolvia Mckeon RDN, LD  04/28/23 10:17 CDT

## 2023-04-28 NOTE — PLAN OF CARE
Goal Outcome Evaluation:  Plan of Care Reviewed With: patient        Progress: improving         Pt with no complaints so far during shift. IVF, TPN, and Lipids infusing through PICC line. Oral abx given per order. SCDs on. Voiding. Tolerating diet. VSS. Safety maintained.

## 2023-04-28 NOTE — PLAN OF CARE
Goal Outcome Evaluation:  Plan of Care Reviewed With: patient        Progress: improving   VSS, denies N/V this shift, tolerating diet, TPN and lipids hung this shift, anti-emetics given as scheduled, ambulated in room and to bathroom ad terrence, plan to d/c home when home PPN approved/arranged, pt has no further questions or concerns at this time, safety maintained, will continue to monitor.

## 2023-04-28 NOTE — CASE MANAGEMENT/SOCIAL WORK
Continued Stay Note  [facility]     Patient Name: Santiago Mcgraw  MRN: [id number]  Today's Date: 4/28/2023    Admit Date: 4/25/2023    Plan: Home   Discharge Plan     Row Name 04/28/23 1345       Plan    Plan Home    Patient/Family in Agreement with Plan yes    Plan Comments Spoke with Ruth at [facility] at [phone]. She has rec'd the orders and they are still waiting on insurance benefits. She says there is no way they can start a PPN over the weekend. She says to check back Monday. Informed pt and Dr. Luo.    Row Name 04/28/23 7035       Plan    Plan Home    Plan Comments Spoke with Anel at [facility] [phone] and she says they cannot provide TPN for the [facility]. Orders now sent to [facility] at [phone]. Noted that pt will actually be on PPN so have new orders and they have been sent.               Discharge Codes    No documentation.               Expected Discharge Date and Time     Expected Discharge Date Expected Discharge Time    Apr 28, 2023             ARAVIND Chu

## 2023-04-29 ENCOUNTER — APPOINTMENT (OUTPATIENT)
Dept: GENERAL RADIOLOGY | Facility: HOSPITAL | Age: 48
End: 2023-04-29
Payer: COMMERCIAL

## 2023-04-29 PROBLEM — R60.0 LOWER EXTREMITY EDEMA: Status: ACTIVE | Noted: 2023-04-29

## 2023-04-29 PROBLEM — M10.9 GOUT FLARE: Status: ACTIVE | Noted: 2023-04-29

## 2023-04-29 LAB
25(OH)D3 SERPL-MCNC: 11.7 NG/ML (ref 30–100)
ALBUMIN SERPL-MCNC: 2.9 G/DL (ref 3.5–5.2)
ALP SERPL-CCNC: 51 U/L (ref 39–117)
ALT SERPL W P-5'-P-CCNC: 10 U/L (ref 1–41)
ANION GAP SERPL CALCULATED.3IONS-SCNC: 7 MMOL/L (ref 5–15)
AST SERPL-CCNC: 8 U/L (ref 1–40)
BILIRUB CONJ SERPL-MCNC: 0.2 MG/DL (ref 0–0.3)
BILIRUB INDIRECT SERPL-MCNC: 0.3 MG/DL
BILIRUB SERPL-MCNC: 0.5 MG/DL (ref 0–1.2)
BUN SERPL-MCNC: 8 MG/DL (ref 6–20)
BUN/CREAT SERPL: 8.5 (ref 7–25)
CA-I BLD-MCNC: 4.63 MG/DL (ref 4.6–5.4)
CALCIUM SPEC-SCNC: 8.7 MG/DL (ref 8.6–10.5)
CHLORIDE SERPL-SCNC: 104 MMOL/L (ref 98–107)
CO2 SERPL-SCNC: 29 MMOL/L (ref 22–29)
CREAT SERPL-MCNC: 0.94 MG/DL (ref 0.76–1.27)
CRP SERPL-MCNC: 2.39 MG/DL (ref 0–0.5)
EGFRCR SERPLBLD CKD-EPI 2021: 100 ML/MIN/1.73
GLUCOSE BLDC GLUCOMTR-MCNC: 140 MG/DL (ref 70–130)
GLUCOSE SERPL-MCNC: 103 MG/DL (ref 65–99)
Lab: NORMAL
MAGNESIUM SERPL-MCNC: 1.9 MG/DL (ref 1.6–2.6)
PHOSPHATE SERPL-MCNC: 3.3 MG/DL (ref 2.5–4.5)
POTASSIUM SERPL-SCNC: 4.3 MMOL/L (ref 3.5–5.2)
PROT SERPL-MCNC: 5 G/DL (ref 6–8.5)
SODIUM SERPL-SCNC: 140 MMOL/L (ref 136–145)
URATE SERPL-MCNC: 5.8 MG/DL (ref 3.4–7)

## 2023-04-29 PROCEDURE — 84425 ASSAY OF VITAMIN B-1: CPT | Performed by: SURGERY

## 2023-04-29 PROCEDURE — 82306 VITAMIN D 25 HYDROXY: CPT | Performed by: SURGERY

## 2023-04-29 PROCEDURE — 84100 ASSAY OF PHOSPHORUS: CPT | Performed by: SURGERY

## 2023-04-29 PROCEDURE — 96375 TX/PRO/DX INJ NEW DRUG ADDON: CPT

## 2023-04-29 PROCEDURE — 63710000001 ONDANSETRON PER 8 MG: Performed by: INTERNAL MEDICINE

## 2023-04-29 PROCEDURE — 82330 ASSAY OF CALCIUM: CPT

## 2023-04-29 PROCEDURE — 84590 ASSAY OF VITAMIN A: CPT | Performed by: SURGERY

## 2023-04-29 PROCEDURE — 25010000002 THIAMINE PER 100 MG: Performed by: SURGERY

## 2023-04-29 PROCEDURE — 25010000002 KETOROLAC TROMETHAMINE PER 15 MG: Performed by: SURGERY

## 2023-04-29 PROCEDURE — 96366 THER/PROPH/DIAG IV INF ADDON: CPT

## 2023-04-29 PROCEDURE — 80076 HEPATIC FUNCTION PANEL: CPT | Performed by: SURGERY

## 2023-04-29 PROCEDURE — 84446 ASSAY OF VITAMIN E: CPT | Performed by: SURGERY

## 2023-04-29 PROCEDURE — 86140 C-REACTIVE PROTEIN: CPT | Performed by: SURGERY

## 2023-04-29 PROCEDURE — 96372 THER/PROPH/DIAG INJ SC/IM: CPT

## 2023-04-29 PROCEDURE — 71046 X-RAY EXAM CHEST 2 VIEWS: CPT

## 2023-04-29 PROCEDURE — G0378 HOSPITAL OBSERVATION PER HR: HCPCS

## 2023-04-29 PROCEDURE — 96361 HYDRATE IV INFUSION ADD-ON: CPT

## 2023-04-29 PROCEDURE — 96368 THER/DIAG CONCURRENT INF: CPT

## 2023-04-29 PROCEDURE — 82948 REAGENT STRIP/BLOOD GLUCOSE: CPT

## 2023-04-29 PROCEDURE — 25010000002 METHYLPREDNISOLONE PER 40 MG: Performed by: INTERNAL MEDICINE

## 2023-04-29 PROCEDURE — 83735 ASSAY OF MAGNESIUM: CPT | Performed by: SURGERY

## 2023-04-29 PROCEDURE — 80048 BASIC METABOLIC PNL TOTAL CA: CPT | Performed by: SURGERY

## 2023-04-29 RX ORDER — KETOROLAC TROMETHAMINE 30 MG/ML
30 INJECTION, SOLUTION INTRAMUSCULAR; INTRAVENOUS EVERY 6 HOURS PRN
Status: DISCONTINUED | OUTPATIENT
Start: 2023-04-29 | End: 2023-05-01 | Stop reason: HOSPADM

## 2023-04-29 RX ORDER — METHYLPREDNISOLONE SODIUM SUCCINATE 40 MG/ML
40 INJECTION, POWDER, LYOPHILIZED, FOR SOLUTION INTRAMUSCULAR; INTRAVENOUS ONCE
Status: COMPLETED | OUTPATIENT
Start: 2023-04-29 | End: 2023-04-29

## 2023-04-29 RX ORDER — MULTIVIT AND MINERALS-FERROUS GLUCONATE 9 MG IRON/15 ML ORAL LIQUID 9 MG/15 ML
15 LIQUID (ML) ORAL DAILY
Status: DISCONTINUED | OUTPATIENT
Start: 2023-04-29 | End: 2023-05-01 | Stop reason: HOSPADM

## 2023-04-29 RX ORDER — KETOROLAC TROMETHAMINE 30 MG/ML
30 INJECTION, SOLUTION INTRAMUSCULAR; INTRAVENOUS ONCE
Status: DISCONTINUED | OUTPATIENT
Start: 2023-04-29 | End: 2023-04-29

## 2023-04-29 RX ADMIN — Medication 10 ML: at 22:12

## 2023-04-29 RX ADMIN — FOLIC ACID 500 MG: 5 INJECTION, SOLUTION INTRAMUSCULAR; INTRAVENOUS; SUBCUTANEOUS at 14:25

## 2023-04-29 RX ADMIN — LEUCINE, PHENYLALANINE, LYSINE, METHIONINE, ISOLEUCINE, VALINE, HISTIDINE, THREONINE, TRYPTOPHAN, ALANINE, GLYCINE, ARGININE, PROLINE, SERINE, TYROSINE, SODIUM ACETATE, DIBASIC POTASSIUM PHOSPHATE, MAGNESIUM CHLORIDE, SODIUM CHLORIDE, CALCIUM CHLORIDE, DEXTROSE
365; 280; 290; 200; 300; 290; 240; 210; 90; 1035; 515; 575; 340; 250; 20; 340; 261; 51; 59; 33; 15 INJECTION INTRAVENOUS at 18:25

## 2023-04-29 RX ADMIN — THIAMINE HYDROCHLORIDE 50 MG: 100 INJECTION, SOLUTION INTRAMUSCULAR; INTRAVENOUS at 08:00

## 2023-04-29 RX ADMIN — AMOXICILLIN AND CLAVULANATE POTASSIUM 1 TABLET: 875; 125 TABLET, FILM COATED ORAL at 22:07

## 2023-04-29 RX ADMIN — KETOROLAC TROMETHAMINE 30 MG: 30 INJECTION, SOLUTION INTRAMUSCULAR; INTRAVENOUS at 14:26

## 2023-04-29 RX ADMIN — Medication 10 ML: at 08:01

## 2023-04-29 RX ADMIN — POTASSIUM CHLORIDE, DEXTROSE MONOHYDRATE AND SODIUM CHLORIDE 75 ML/HR: 300; 5; 900 INJECTION, SOLUTION INTRAVENOUS at 07:27

## 2023-04-29 RX ADMIN — Medication 10 ML: at 22:13

## 2023-04-29 RX ADMIN — AMOXICILLIN AND CLAVULANATE POTASSIUM 1 TABLET: 875; 125 TABLET, FILM COATED ORAL at 08:00

## 2023-04-29 RX ADMIN — METOCLOPRAMIDE 10 MG: 10 TABLET ORAL at 12:05

## 2023-04-29 RX ADMIN — I.V. FAT EMULSION 50 G: 20 EMULSION INTRAVENOUS at 18:25

## 2023-04-29 RX ADMIN — METOCLOPRAMIDE 10 MG: 10 TABLET ORAL at 08:00

## 2023-04-29 RX ADMIN — ASCORBIC ACID, THIAMINE, RIBOFLAVIN, NIACINAMIDE, PYRIDOXINE, FOLIC ACID, COBALAMIN, BIOTIN, PANTOTHENIC ACID 15 ML: 100; 1.5; 1.7; 20; 10; 1; 6; 300; 1 TABLET, COATED ORAL at 22:07

## 2023-04-29 RX ADMIN — METHYLPREDNISOLONE SODIUM SUCCINATE 40 MG: 40 INJECTION, POWDER, FOR SOLUTION INTRAMUSCULAR; INTRAVENOUS at 14:06

## 2023-04-29 RX ADMIN — METOCLOPRAMIDE 10 MG: 10 TABLET ORAL at 16:59

## 2023-04-29 RX ADMIN — ONDANSETRON 8 MG: 8 TABLET, ORALLY DISINTEGRATING ORAL at 06:41

## 2023-04-29 RX ADMIN — ONDANSETRON 8 MG: 8 TABLET, ORALLY DISINTEGRATING ORAL at 14:06

## 2023-04-29 RX ADMIN — Medication 10 ML: at 22:08

## 2023-04-29 RX ADMIN — ONDANSETRON 8 MG: 8 TABLET, ORALLY DISINTEGRATING ORAL at 22:08

## 2023-04-29 NOTE — PLAN OF CARE
Goal Outcome Evaluation:  Plan of Care Reviewed With: patient        Progress: improving         Pt with no complaints so far during shift. IVF, TPN, and Lipids infusing through PICC. Oral abx given. Scheduled anti-emetics given per order. SCDs on. Voiding. Tolerating diet. VSS. Safety maintained.

## 2023-04-29 NOTE — PLAN OF CARE
Goal Outcome Evaluation:         Pt denies N/V today. Had multiple bowel movements. Receiving TPN and lipids currently. VSS.

## 2023-04-29 NOTE — PROGRESS NOTES
"Patient Care Team:  Angelo Mckinnon APRN as PCP - General (Family Medicine)  Francie Cabrera APRN as Nurse Practitioner (Nurse Practitioner)    Subjective     Santiago Mcgraw is POD several months ago from  [facility] [place] presenting with malnutrition from chronic enteritis.  Patient is currently on TPN and has now been able to tolerate p.o. intake.  This a.m. patient states she is doing better and has more energy however he still has pain in his left foot which is consistent with his gout flareups.  It makes it difficult for him to get up and around during the day.       Review of Systems:     Review of Systems - General ROS: negative  Respiratory ROS: no cough, shortness of breath, or wheezing  Cardiovascular ROS: no chest pain or dyspnea on exertion  Gastrointestinal ROS: no abdominal pain, change in bowel habits, or black or bloody stools  Musculoskeletal ROS: positive for - pain in foot - left    Objective     Vital Signs  /89 (BP Location: Right arm, Patient Position: Lying)   Pulse 94   Temp 97.6 °F (36.4 °C) (Oral)   Resp 16   Ht 182.9 cm (72\")   Wt (!) 151 kg (332 lb)   SpO2 95%   BMI 45.03 kg/m²     Physical Exam:    HEENT: extra ocular movement intact  Respiratory: appears well, vitals normal, no respiratory distress, acyanotic, normal RR, chest clear, no wheezing, crepitations, rhonchi, normal symmetric air entry  Cardiovascular: Regular rate and rhythm, S1, S2 normal, no murmur, click, rub or gallop  GI: Soft, non-tender, normal bowel sounds; no bruits, organomegaly or masses.  Abnormal shape: obese  Musculoskeletal: Left foot pain  Neurologic: alert, oriented, normal speech, no focal findings or movement disorder noted     Results Review:    Pending  Labs reviewed    Medication Reviewed:   Current Facility-Administered Medications   Medication Dose Route Frequency Provider Last Rate Last Admin   • Adult Standard Central TPN   Intravenous Q24H (TPN) Solitario Luo MD 65 mL/hr at " 04/29/23 0703 Currently Infusing at 04/29/23 0703   • amoxicillin-clavulanate (AUGMENTIN) 875-125 MG per tablet 1 tablet  1 tablet Oral Q12H Ramandeep Malone MD   1 tablet at 04/29/23 0800   • dextrose 5 % and sodium chloride 0.9 % with KCl 40 mEq/L infusion  50 mL/hr Intravenous Continuous Solitario Luo MD 50 mL/hr at 04/29/23 1120 50 mL/hr at 04/29/23 1120   • iopamidol (ISOVUE-300) 61 % injection 50 mL  50 mL Oral Once in imaging Solitario Luo MD       • ketorolac (TORADOL) injection 30 mg  30 mg Intravenous Q6H PRN Solitario Luo MD       • metoclopramide (REGLAN) tablet 10 mg  10 mg Oral TID AC Ramandeep Malone MD   10 mg at 04/29/23 0800   • ondansetron ODT (ZOFRAN-ODT) disintegrating tablet 8 mg  8 mg Oral Q8H Ramandeep Malone MD   8 mg at 04/29/23 0641   • Pharmacy Consult - Pharmacy to dose   Does not apply Continuous Solitario Rivas MD       • Pharmacy to Dose TPN   Does not apply Continuous CORYN Solitario Luo MD       • sodium chloride 0.9 % flush 10 mL  10 mL Intravenous Q12H Solitario Luo MD   10 mL at 04/29/23 0801   • sodium chloride 0.9 % flush 10 mL  10 mL Intravenous PRN Solitario Luo MD       • sodium chloride 0.9 % flush 10 mL  10 mL Intravenous Q12H Solitario Luo MD   10 mL at 04/29/23 0801   • sodium chloride 0.9 % flush 10 mL  10 mL Intravenous Q12H Solitario Luo MD   10 mL at 04/29/23 0801   • sodium chloride 0.9 % flush 10 mL  10 mL Intravenous PRN Solitario Luo MD       • sodium chloride 0.9 % flush 20 mL  20 mL Intravenous PRN Soltiario Luo MD       • sodium chloride 0.9 % infusion 40 mL  40 mL Intravenous PRN Solitario Luo MD       • sodium chloride 0.9 % infusion 40 mL  40 mL Intravenous PRN Solitario Luo MD       • thiamine (B-1) injection 50 mg  50 mg Intramuscular BID Solitario Luo MD   50 mg at 04/29/23 0800       Assessment & Plan  POD several months ago from [facility]  Patient had hypokalemia which has been corrected.  His  biggest complaint now is his left foot pain which is secondary to gout.  I will consult the hospitalist for recommendations and treatment.  He is tolerating p.o. intake better now.  I will also repeat a chest x-ray to assess the findings of his CT scan after being on antibiotics.  I have also provided a as needed Toradol IV for his pain as needed to help facilitate relief.  We will continue with pharmacy/dietitian recommendations for nutrition supplementation.  Patient will be going home on PPN per their recommendations.  Informed the patient that we will continue to monitor him and improve his nutritional status today as well as tomorrow for possible home soon on TPN next week.      Solitario Luo MD  04/29/23  11:19 CDT

## 2023-04-29 NOTE — CONSULTS
[facility] Medicine Consult  Consults    Date of Admission: 4/25/2023  Date of Consult: 04/29/23    Primary Care Physician: Angelo Mckinnon APRN  Referring Physician: Dr. Luo  Chief Complaint/Reason for Consultation: suspected gout flare    Subjective   History of Present Illness  Patient is a 47-year-old  male that is admitted to the services of Dr. Luo of bariatric surgery.  Patient states that back in February he underwent the sleeve gastrectomy procedure, and has been having some issues with intermittent nausea and vomiting.  He informed me that he has lost about 75 pounds since his surgery.  He has been having issues recently with diarrhea as well.  He has been hospitalized since 4/25.  Patient states that over the past few weeks he has been having issues with what he suspects is a gout flare.  He describes having a flare in his right toe a few weeks ago but subsequently subsided.  Now he is experiencing similar pain in his left foot, but symptoms are located more laterally and up near the left ankle.  Hospitalist service was consulted for recommendations for treatment of his suspected gout flare.  The only other symptom the patient reports is worsening of his lower extremity edema.    Review of Systems   Otherwise complete ROS is negative except as mentioned above.    Past Medical History:   Past Medical History:   Diagnosis Date   • Hypertension    • Obesity    • Obstructive sleep apnea treated with continuous positive airway pressure (CPAP)    • Pain     back and joint   • Personal history of COVID-19 05/2022   • Personal history of COVID-19 01/2023     Past Surgical History:  Past Surgical History:   Procedure Laterality Date   • ENDOSCOPY N/A 01/06/2023    Procedure: ESOPHAGOGASTRODUODENOSCOPY WITH ANESTHESIA;  Surgeon: Solitario Luo MD;  Location: [facility] ENDOSCOPY;  Service: General;  Laterality: N/A;  pre morbid obesity  post  miriam  teresasydniezenia aprn   • GASTRIC SLEEVE LAPAROSCOPIC N/A 2/23/2023    Procedure: GASTRIC SLEEVE LAPAROSCOPIC WITH DAVINCI ROBOT;  Surgeon: Solitario Luo MD;  Location: [facility];  Service: Robotics - DaVinci;  Laterality: N/A;   • HIP SURGERY Left 1986     Social History:  reports that he has never smoked. He has never used smokeless tobacco. He reports that he does not drink alcohol and does not use drugs.    Family History: family history includes Arthritis in his brother, father, maternal grandmother, mother, and paternal grandmother; Cancer in his maternal grandfather; Diabetes in his father; Hypertension in his father, maternal grandfather, maternal grandmother, mother, paternal grandfather, and paternal grandmother; Obesity in his brother, father, mother, paternal grandfather, and paternal grandmother; Stroke in his paternal grandfather.     Allergies:   Allergies   Allergen Reactions   • Adhesive Tape Swelling and Rash     Latex based tape   • Latex Swelling and Rash     Medications: Scheduled Meds:amoxicillin-clavulanate, 1 tablet, Oral, Q12H  iopamidol, 50 mL, Oral, Once in imaging  methylPREDNISolone sodium succinate, 40 mg, Intravenous, Once  metoclopramide, 10 mg, Oral, TID AC  ondansetron ODT, 8 mg, Oral, Q8H  sodium chloride, 10 mL, Intravenous, Q12H  sodium chloride, 10 mL, Intravenous, Q12H  sodium chloride, 10 mL, Intravenous, Q12H  thiamine, 50 mg, Intramuscular, BID      Continuous Infusions:Adult Standard Central TPN, , Last Rate: 65 mL/hr at 04/29/23 0703  dextrose 5% and sodium chloride 0.9% with KCl 40 mEq/L, 50 mL/hr, Last Rate: 50 mL/hr (04/29/23 1120)  Pharmacy Consult - Pharmacy to dose,   Pharmacy to Dose TPN,       PRN Meds:.•  ketorolac  •  Pharmacy Consult - Pharmacy to dose  •  Pharmacy to Dose TPN  •  sodium chloride  •  sodium chloride  •  sodium chloride  •  sodium chloride  •  sodium chloride    I have utilized all available immediate resources to obtain, update, or review the  patient's current medications (including all prescriptions, over-the-counter products, herbals, cannabis/cannabidiol products, and vitamin/mineral/dietary (nutritional) supplements).     Objective   Objective    Physical Exam:   Temp:  [97.6 °F (36.4 °C)-99.3 °F (37.4 °C)] 98.5 °F (36.9 °C)  Heart Rate:  [75-94] 75  Resp:  [16] 16  BP: (117-157)/(58-89) 117/66  Physical Exam  Vitals reviewed.   Constitutional:       General: He is not in acute distress.     Appearance: He is not toxic-appearing.      Comments: Sitting up in bedside chair   HENT:      Head: Normocephalic.      Mouth/Throat:      Mouth: Mucous membranes are moist.   Pulmonary:      Effort: Pulmonary effort is normal. No respiratory distress.   Musculoskeletal:         General: No deformity.      Right lower leg: Edema present.      Left lower leg: Edema present.      Comments: Pitting edema to lower extremities   Skin:     General: Skin is warm.      Findings: Erythema (mild; located more laterally (left foot)) present.   Neurological:      General: No focal deficit present.      Mental Status: He is alert.   Psychiatric:         Mood and Affect: Mood normal.           Results Reviewed:  I have personally reviewed current lab, radiology, and data and agree with results.  Lab Results (last 24 hours)     Procedure Component Value Units Date/Time    Vitamin A & E [[id number]] Collected: 04/29/23 1117    Specimen: Blood Updated: 04/29/23 1117    Vitamin D,25-Hydroxy [[id number]] Collected: 04/29/23 1115    Specimen: Blood Updated: 04/29/23 1115    Vitamin B1, Whole Blood [[id number]] Collected: 04/29/23 1115    Specimen: Blood Updated: 04/29/23 1115        Imaging Results (Last 24 Hours)     ** No results found for the last 24 hours. **          Assessment / Plan   Assessment:   Active Hospital Problems    Diagnosis    • **Nausea & vomiting    • Gout flare    • Lower extremity edema         Treatment Plan  1.  Somewhat of a difficult situation as the  standard go-to medications for gout flare are NSAIDS, glucocorticoids, and colchicine.  All of these medications can have potential impact on the GI tract.  2.  Agree with trial of IV Toradol  3.  Normally would start a trial of oral glucocorticoids but in this setting will give IV methylprednisolone X 1 and monitor response.  May repeat dose in 24-48 hours pending response.  4.  Check uric acid - add to labs already sent in lab (no new blood draw required)  5.  Reviewed XR of left foot from earlier this month -degenerative spurring but no other acute changes  6.  Consider low dose diuretic for worsening lower extremity edema; possibly even compression stockings or Unna boots once gout flare symptoms are better controlled  7.  Thank you for consult; will follow    Medical Decision Making  Number and Complexity of problems: Moderate complexity; management of acute gout flare and patient with recent sleeve gastrectomy procedure with ongoing nausea, vomiting, diarrhea; requiring total parenteral nutrition and now restarted on a p.o. diet      Conditions and Status        Condition is worsening.     MDM Data  External documents reviewed: none  Cardiac tracing (EKG, telemetry) interpretation: none  Radiology interpretation: XR left foot from 4/13/23 reviewed  Labs reviewed: as above  Any tests that were considered but not ordered: none at this time     Decision rules/scores evaluated (example [id number], [place], etc): none at this time     Discussed with: patient     Care Planning  Shared decision making: Discussed with patient in agreement to proceed with treatment plan as outlined  Code status and discussions: Full Code    Disposition  Social Determinants of Health that impact treatment or disposition: None apparent at this time  I expect the patient to be discharged by the primary service.     I confirmed that the patient's Advance Care Plan is present, code status is documented, or surrogate decision maker is listed  in the patient's medical record.         Electronically signed by Marcell Schaeffer MD, 04/29/23, 12:49 CDT.

## 2023-04-29 NOTE — PROGRESS NOTES
"Pharmacy Parenteral Nutrition Daily Evaluation    Santiago Mcgraw is a  47 y.o. male that pharmacy has been consulted for TPN management for post-op laparoscopic sleeve gastrectomy with continued nausea/vomiting, concern for nutritional depletion of protein,Low thiamine and folate levels per Dr. Luo' request.  [Ht: 182.9 cm (72\"); Wt: (!) 151 kg (332 lb)]  Admission Date: 425      Subjective:  Progress notes reviewed.    Objective:  Temp (24hrs), Av.5 °F (36.9 °C), Min:97.6 °F (36.4 °C), Max:99.3 °F (37.4 °C)    Vent Settings       Results from last 7 days   Lab Units 23  1530 23  1359 23  0434 23  0813 23  0449 23  2130   SODIUM mmol/L 140  --  139 139 140  --    POTASSIUM mmol/L 4.3  --  4.0 4.0 3.2*  --    CHLORIDE mmol/L 104  --  104 105 101  --    MAGNESIUM mg/dL 1.9  --   --   --  1.8 1.8   PHOSPHORUS mg/dL 3.3  --   --   --  3.6  --    CO2 mmol/L 29.0  --  23.0 25.0 28.0  --    TOTAL PROTEIN g/dL  --  5.0* 5.2* 5.4* 5.8*  --    BUN mg/dL 8  --  8 9 11  --    CREATININE mg/dL 0.94  --  0.95 0.98 0.99  --    CALCIUM mg/dL 8.7  --  8.4* 8.3* 8.6  --    PREALBUMIN mg/dL  --   --   --  9.3*  --   --    ALBUMIN g/dL  --  2.9* 2.9* 2.9* 3.2*  --    BILIRUBIN mg/dL  --  0.5 0.3 0.4 0.7  --    ALK PHOS U/L  --  51 56 62 70  --    ALT (SGPT) U/L  --  10 13 14 15  --    AST (SGOT) U/L  --  8 8 7 11  --    GLUCOSE mg/dL 103*  --  119* 119* 107*  --        Triglycerides   Date Value Ref Range Status   2023 129 0 - 150 mg/dL Final       Corrected Ca (Calcium + (0.8 * (4-albumin)) = 9.58    Results from last 7 days   Lab Units 23  0813 23  0602 23  1531   WBC 10*3/mm3 7.13 7.78 13.34*   HEMOGLOBIN g/dL 10.2* 10.7* 12.6*   HEMATOCRIT % 32.2* 33.5* 38.8   PLATELETS 10*3/mm3 185 205 230       estimated creatinine clearance is 145.4 mL/min (by C-G formula based on SCr of 0.94 mg/dL).    Intake & Output (last 3 days)        07 0700  " 0701  04/28 0700 04/28 0701  04/29 0700 04/29 0701 04/30 0700    P.O. 600 480 240 240    I.V. (mL/kg) 3488.1 (23.4) 1942.7 (13.3) 880 (5.8) 1020.9 (6.8)    IV Piggyback    250    TPN   2895     Total Intake(mL/kg) 4088.1 (27.4) 2422.7 (16.6) 4015 (26.6) 1510.9 (10)    Net +4088.1 +2422.7 +4015 +1510.9            Urine Unmeasured Occurrence 2 x 7 x 9 x 2 x    Stool Unmeasured Occurrence 1 x  1 x 1 x          Above labs reviewed.     Dietitian Recommendations  Diet Orders (active) (From admission, onward)     Start     Ordered    04/27/23 1600  DIET MESSAGE Please send Orange Juice on every tray (TID) and one now. HILDA Jimenes  3 Times Daily      Comments: Please send Orange Juice on every tray (TID) and one now. HLIDA Jimenes    04/27/23 1026                Current TPN Regimen Recommendation:  Dextrose 15% / Amino Acid 5% at goal rate of 65 mL/hr.  20% Lipid Emulsion 250 mL every 24 hours.    Daily Assessment:  TPN Therapy Day 4    TPN Medication Recent History (Show up to 4 orders; newest on the left. Changes between the two most recent orders are indicated.)     Start date and time    04/29/2023 1800  04/28/2023 1800  04/27/2023 1800  04/26/2023 1800      Adult Standard Central TPN [[id number]] Adult Standard Central TPN [[id number]] Adult Standard Central TPN [[id number]] Adult Standard Central TPN [[id number]]    Order Status  Active Last Dose in Progress Completed Completed    Last Admin   Currently Infusing at 04/29/2023 0703 by Aliza Melgar, RN New Bag at 04/27/2023 1728 by Esther Plummer RN Currently Infusing at 04/27/2023 0650 by Aliza Melgar, RN    Frequency  Q24H (TPN) Q24H (TPN) Q24H (TPN) Q24H (TPN)       Additives    multivitamin (adult)  -- 10 mL -- 10 mL    Trace Minerals Cu-Mn-Se-Zn  -- 1 mL -- 1 mL       Amino Acids in Dextrose Premix    amino acids 5% - electrolytes in dextrose 15%  2,000 mL 2,000 mL 2,000 mL 2,000 mL       Energy Contribution    Proteins  400 kcal 400 kcal 400 kcal  400 kcal    Dextrose  1,020 kcal 1,020 kcal 1,020 kcal 1,020 kcal    Lipids  -- -- -- --    Total  1,420 kcal 1,420 kcal 1,420 kcal 1,420 kcal       Electrolyte Ion Calculated Amount    Sodium  70 mEq 70 mEq 70 mEq 70 mEq    Potassium  60 mEq 60 mEq 60 mEq 60 mEq    Calcium  9 mEq 9 mEq 9 mEq 9 mEq    Magnesium  10 mEq 10 mEq 10 mEq 10 mEq    Aluminum  -- -- -- --    Phosphate  30 mmol 30 mmol 30 mmol 30 mmol    Chloride  78 mEq 78 mEq 78 mEq 78 mEq    Acetate  160 mEq 160 mEq 160 mEq 160 mEq    Chloride: Acetate Ratio  0.49 0.49 0.49 0.49       Other    Total Amino Acid  100 g 100 g 100 g 100 g    Total Amino Acid/kg  0.66 g/kg 0.68 g/kg 0.67 g/kg 0.68 g/kg    Glucose Infusion Rate  1.08 mg/kg/min 1.11 mg/kg/min 1.68 mg/kg/min 0.42 mg/kg/min    Osmolarity  1,399 1,391.35 1,399 1,391.35    Volume  2,000 mL 2,011 mL 2,000 mL 2,011 mL    Rate  65 mL/hr 65 mL/hr 100 mL/hr 25 mL/hr    Dosing Weight  151 kg 146 kg 149 kg 147 kg    Infusion [facility]  [facility]           Additional insulin administration while previous TPN infusin units  Additional electrolyte administration while previous TPN infusing: D5NS with 40 mEq/L KCl @ 50 mL/hr    Plan:  1. TPN Formula:     Clinimix 5/15 E (1560 mL/day)   Lipids: 250 mL/day over 12 hours   Volume: 1560 mL (65 mL/hr over 24 hrs daily)    Based on the above labs, will add the following electrolytes/additives to the TPN.        2. TPN will provide:   Lipids: 500 kcal   Protein: 78 g = 312 kcal  Dextrose: 234 g =  795.6 kcal   Total: 1607.6 kcal    3. Electrolyte replacements can be ordered as needed.     4. Based on consult note today with concerns of low thiamine and folate levels, placed order for supplemental IV of Thiamine 500mg daily, Folic acid 1,000 mcg daily for 5 days. Would recommend decrease of Thiamine to 250mg daily for an additional 5 days once initial 500mg replacement completed.     Also added liquid multivitamin 15 mL po daily secondary to  shortage of IV MVI.     5. Pharmacy will continue to follow closely and make adjustments to TPN as necessary.    ANGELICA Murphy PharmD  4/29/2023  16:29 CDT

## 2023-04-29 NOTE — PROGRESS NOTES
Nutrition Services    Patient Name:  Santiago Mcgraw  YOB: 1975  MRN: [id number]  Admit Date:  4/25/2023    Pt continues on a bariatric stage 5 diet and tolerating. TPN decreased to 65 mL/hr.    For home PPN, recommend: Dextrose 5%, Amino acids 4.25% at 75mL/hour with multivitamins and trace minerals added per home health infusing company.    Electronically signed by:  Ashlee Donaldson RD  04/29/23 13:05 CDT

## 2023-04-30 LAB
ANION GAP SERPL CALCULATED.3IONS-SCNC: 7 MMOL/L (ref 5–15)
BUN SERPL-MCNC: 10 MG/DL (ref 6–20)
BUN/CREAT SERPL: 12.5 (ref 7–25)
CALCIUM SPEC-SCNC: 9 MG/DL (ref 8.6–10.5)
CHLORIDE SERPL-SCNC: 106 MMOL/L (ref 98–107)
CO2 SERPL-SCNC: 26 MMOL/L (ref 22–29)
CREAT SERPL-MCNC: 0.8 MG/DL (ref 0.76–1.27)
EGFRCR SERPLBLD CKD-EPI 2021: 109.2 ML/MIN/1.73
GLUCOSE BLDC GLUCOMTR-MCNC: 102 MG/DL (ref 70–130)
GLUCOSE BLDC GLUCOMTR-MCNC: 107 MG/DL (ref 70–130)
GLUCOSE BLDC GLUCOMTR-MCNC: 131 MG/DL (ref 70–130)
GLUCOSE SERPL-MCNC: 141 MG/DL (ref 65–99)
MAGNESIUM SERPL-MCNC: 2.1 MG/DL (ref 1.6–2.6)
PHOSPHATE SERPL-MCNC: 3.3 MG/DL (ref 2.5–4.5)
POTASSIUM SERPL-SCNC: 4.4 MMOL/L (ref 3.5–5.2)
SODIUM SERPL-SCNC: 139 MMOL/L (ref 136–145)
TRANSFERRIN SERPL-MCNC: 124 MG/DL (ref 200–360)
VIT B1 BLD-SCNC: 219.4 NMOL/L (ref 66.5–200)
VIT B1 BLD-SCNC: 65.3 NMOL/L (ref 66.5–200)

## 2023-04-30 PROCEDURE — 96376 TX/PRO/DX INJ SAME DRUG ADON: CPT

## 2023-04-30 PROCEDURE — 82948 REAGENT STRIP/BLOOD GLUCOSE: CPT

## 2023-04-30 PROCEDURE — 80048 BASIC METABOLIC PNL TOTAL CA: CPT | Performed by: SURGERY

## 2023-04-30 PROCEDURE — 83883 ASSAY NEPHELOMETRY NOT SPEC: CPT | Performed by: SURGERY

## 2023-04-30 PROCEDURE — 84100 ASSAY OF PHOSPHORUS: CPT | Performed by: SURGERY

## 2023-04-30 PROCEDURE — G0378 HOSPITAL OBSERVATION PER HR: HCPCS

## 2023-04-30 PROCEDURE — 63710000001 ONDANSETRON PER 8 MG: Performed by: INTERNAL MEDICINE

## 2023-04-30 PROCEDURE — 25010000002 FUROSEMIDE PER 20 MG: Performed by: INTERNAL MEDICINE

## 2023-04-30 PROCEDURE — 25010000002 THIAMINE PER 100 MG: Performed by: SURGERY

## 2023-04-30 PROCEDURE — 83735 ASSAY OF MAGNESIUM: CPT | Performed by: SURGERY

## 2023-04-30 PROCEDURE — 25010000002 KETOROLAC TROMETHAMINE PER 15 MG: Performed by: SURGERY

## 2023-04-30 PROCEDURE — 84466 ASSAY OF TRANSFERRIN: CPT | Performed by: SURGERY

## 2023-04-30 PROCEDURE — 96375 TX/PRO/DX INJ NEW DRUG ADDON: CPT

## 2023-04-30 RX ORDER — FUROSEMIDE 10 MG/ML
20 INJECTION INTRAMUSCULAR; INTRAVENOUS ONCE
Status: COMPLETED | OUTPATIENT
Start: 2023-04-30 | End: 2023-04-30

## 2023-04-30 RX ADMIN — Medication 10 ML: at 20:46

## 2023-04-30 RX ADMIN — METOCLOPRAMIDE 10 MG: 10 TABLET ORAL at 08:27

## 2023-04-30 RX ADMIN — METOCLOPRAMIDE 10 MG: 10 TABLET ORAL at 17:36

## 2023-04-30 RX ADMIN — Medication 10 ML: at 20:47

## 2023-04-30 RX ADMIN — FUROSEMIDE 20 MG: 10 INJECTION, SOLUTION INTRAMUSCULAR; INTRAVENOUS at 10:58

## 2023-04-30 RX ADMIN — AMOXICILLIN AND CLAVULANATE POTASSIUM 1 TABLET: 875; 125 TABLET, FILM COATED ORAL at 08:27

## 2023-04-30 RX ADMIN — AMOXICILLIN AND CLAVULANATE POTASSIUM 1 TABLET: 875; 125 TABLET, FILM COATED ORAL at 20:45

## 2023-04-30 RX ADMIN — ONDANSETRON 8 MG: 8 TABLET, ORALLY DISINTEGRATING ORAL at 14:53

## 2023-04-30 RX ADMIN — POTASSIUM CHLORIDE, DEXTROSE MONOHYDRATE AND SODIUM CHLORIDE 50 ML/HR: 300; 5; 900 INJECTION, SOLUTION INTRAVENOUS at 01:43

## 2023-04-30 RX ADMIN — FOLIC ACID: 5 INJECTION, SOLUTION INTRAMUSCULAR; INTRAVENOUS; SUBCUTANEOUS at 17:34

## 2023-04-30 RX ADMIN — Medication 10 ML: at 22:03

## 2023-04-30 RX ADMIN — FOLIC ACID 500 MG: 5 INJECTION, SOLUTION INTRAMUSCULAR; INTRAVENOUS; SUBCUTANEOUS at 08:30

## 2023-04-30 RX ADMIN — METOCLOPRAMIDE 10 MG: 10 TABLET ORAL at 11:00

## 2023-04-30 RX ADMIN — ONDANSETRON 8 MG: 8 TABLET, ORALLY DISINTEGRATING ORAL at 05:35

## 2023-04-30 RX ADMIN — ASCORBIC ACID, THIAMINE, RIBOFLAVIN, NIACINAMIDE, PYRIDOXINE, FOLIC ACID, COBALAMIN, BIOTIN, PANTOTHENIC ACID 15 ML: 100; 1.5; 1.7; 20; 10; 1; 6; 300; 1 TABLET, COATED ORAL at 08:27

## 2023-04-30 RX ADMIN — KETOROLAC TROMETHAMINE 30 MG: 30 INJECTION, SOLUTION INTRAMUSCULAR; INTRAVENOUS at 11:00

## 2023-04-30 RX ADMIN — ONDANSETRON 8 MG: 8 TABLET, ORALLY DISINTEGRATING ORAL at 21:46

## 2023-04-30 RX ADMIN — I.V. FAT EMULSION 50 G: 20 EMULSION INTRAVENOUS at 17:35

## 2023-04-30 NOTE — PLAN OF CARE
Goal Outcome Evaluation:  Plan of Care Reviewed With: patient        Progress: no change  Outcome Evaluation: Level 5 bariatric diet with TPN;  pt getting augmentin po,  40 of K+ in IVF,  TPN,  and lipids;  no c/o pain;  safety maintained

## 2023-04-30 NOTE — PROGRESS NOTES
"Pharmacy Parenteral Nutrition Daily Evaluation    Santiago Mcgraw is a  47 y.o. male that pharmacy has been consulted for TPN management for post-op laparoscopic sleeve gastrectomy with continued nausea/vomiting, concern for nutritional depletion of protein,Low thiamine and folate levels per Dr. Luo' request    [Ht: 182.9 cm (72\"); Wt: (!) 151 kg (332 lb)]  Admission Date: 425      Subjective:  Progress notes reviewed.    Objective:  Temp (24hrs), Av.1 °F (36.7 °C), Min:97.8 °F (36.6 °C), Max:98.6 °F (37 °C)    Vent Settings       Results from last 7 days   Lab Units 23  0526 23  1530 23  1359 23  0434 23  0813 23  0449   SODIUM mmol/L 139 140  --  139 139 140   POTASSIUM mmol/L 4.4 4.3  --  4.0 4.0 3.2*   CHLORIDE mmol/L 106 104  --  104 105 101   MAGNESIUM mg/dL 2.1 1.9  --   --   --  1.8   PHOSPHORUS mg/dL 3.3 3.3  --   --   --  3.6   CO2 mmol/L 26.0 29.0  --  23.0 25.0 28.0   TOTAL PROTEIN g/dL  --   --  5.0* 5.2* 5.4* 5.8*   BUN mg/dL 10 8  --  8 9 11   CREATININE mg/dL 0.80 0.94  --  0.95 0.98 0.99   CALCIUM mg/dL 9.0 8.7  --  8.4* 8.3* 8.6   PREALBUMIN mg/dL  --   --   --   --  9.3*  --    ALBUMIN g/dL  --   --  2.9* 2.9* 2.9* 3.2*   BILIRUBIN mg/dL  --   --  0.5 0.3 0.4 0.7   ALK PHOS U/L  --   --  51 56 62 70   ALT (SGPT) U/L  --   --  10 13 14 15   AST (SGOT) U/L  --   --  8 8 7 11   GLUCOSE mg/dL 141* 103*  --  119* 119* 107*       Triglycerides   Date Value Ref Range Status   2023 129 0 - 150 mg/dL Final       Corrected Ca (Calcium + (0.8 * (4-albumin)) = 9.88    Results from last 7 days   Lab Units 23  0813 23  0602 23  1531   WBC 10*3/mm3 7.13 7.78 13.34*   HEMOGLOBIN g/dL 10.2* 10.7* 12.6*   HEMATOCRIT % 32.2* 33.5* 38.8   PLATELETS 10*3/mm3 185 205 230       estimated creatinine clearance is 170.9 mL/min (by C-G formula based on SCr of 0.8 mg/dL).    Intake & Output (last 3 days)        07 07 07 07 " 04/29 0701  04/30 0700 04/30 0701  05/01 0700    P.O. 480 240 240 240    I.V. (mL/kg) 1942.7 (13.3) 880 (5.8) 2018.9 (13.4)     IV Piggyback   250     TPN  2895 1858     Total Intake(mL/kg) 2422.7 (16.6) 4015 (26.6) 4366.9 (28.9) 240 (1.6)    Urine (mL/kg/hr)    200 (0.2)    Stool    0    Total Output    200    Net +2422.7 +4015 +4366.9 +40            Urine Unmeasured Occurrence 7 x 9 x 2 x 2 x    Stool Unmeasured Occurrence  1 x 1 x 1 x          Above labs reviewed.     Dietitian Recommendations  Diet Orders (active) (From admission, onward)     Start     Ordered    04/30/23 1800  Adult Standard Central TPN  Every 24 Hours Scheduled (TPN)         04/30/23 1229    04/30/23 1800  Fat Emulsion Plant Based (INTRALIPID,LIPOSYN) 20 % infusion 50 g  Every 24 Hours Scheduled (TPN)         04/30/23 1229    04/30/23 1003  Diet: Regular/House Diet; Bariatric Stage 5; Texture: Regular Texture (IDDSI 7); Fluid Consistency: Thin (IDDSI 0)  Diet Effective Now         04/30/23 1002    04/27/23 1600  DIET MESSAGE Please send Orange Juice on every tray (TID) and one now. HILDA Jimenes  3 Times Daily      Comments: Please send Orange Juice on every tray (TID) and one now. HILDA Jimenes    04/27/23 1026                Current TPN Regimen Recommendation:  Dextrose 15% / Amino Acid 5% at goal rate of 65 mL/hr.  20% Lipid Emulsion 250 mL every 24 hours.    Daily Assessment:  TPN Therapy Day 5    TPN Medication Recent History (Show up to 4 orders; newest on the left. Changes between the two most recent orders are indicated.)     Start date and time    04/30/2023 1800  04/29/2023 1800  04/28/2023 1800  04/27/2023 1800      Adult Standard Central TPN [[id number]] Adult Standard Central TPN [[id number]] Adult Standard Central TPN [[id number]] Adult Standard Central TPN [[id number]]    Order Status  Active Last Dose in Progress Completed Completed    Last Admin   New Bag at 04/29/2023 1825 by Jayy Lee, RN Currently Infusing at  2023 0703 by Aliza Melgar, RN New Bag at 2023 1728 by Esther Plummer RN    Frequency  Q24H (TPN) Q24H (TPN) Q24H (TPN) Q24H (TPN)       Additives    multivitamin (adult)  -- -- 10 mL --    Trace Minerals Cu-Mn-Se-Zn  -- -- 1 mL --       Amino Acids in Dextrose Premix    amino acids 5% - electrolytes in dextrose 15%  2,000 mL 2,000 mL 2,000 mL 2,000 mL       Energy Contribution    Proteins  400 kcal 400 kcal 400 kcal 400 kcal    Dextrose  1,020 kcal 1,020 kcal 1,020 kcal 1,020 kcal    Lipids  -- -- -- --    Total  1,420 kcal 1,420 kcal 1,420 kcal 1,420 kcal       Electrolyte Ion Calculated Amount    Sodium  70 mEq 70 mEq 70 mEq 70 mEq    Potassium  60 mEq 60 mEq 60 mEq 60 mEq    Calcium  9 mEq 9 mEq 9 mEq 9 mEq    Magnesium  10 mEq 10 mEq 10 mEq 10 mEq    Aluminum  -- -- -- --    Phosphate  30 mmol 30 mmol 30 mmol 30 mmol    Chloride  78 mEq 78 mEq 78 mEq 78 mEq    Acetate  160 mEq 160 mEq 160 mEq 160 mEq    Chloride: Acetate Ratio  0.49 0.49 0.49 0.49       Other    Total Amino Acid  100 g 100 g 100 g 100 g    Total Amino Acid/kg  0.66 g/kg 0.66 g/kg 0.68 g/kg 0.67 g/kg    Glucose Infusion Rate  1.08 mg/kg/min 1.08 mg/kg/min 1.11 mg/kg/min 1.68 mg/kg/min    Osmolarity  1,399 1,399 1,391.35 1,399    Volume  2,000 mL 2,000 mL 2,011 mL 2,000 mL    Rate  65 mL/hr 65 mL/hr 65 mL/hr 100 mL/hr    Dosing Weight  151 kg 151 kg 146 kg 149 kg    Infusion Site  [facility]           Additional insulin administration while previous TPN infusin units  Additional electrolyte administration while previous TPN infusing: D5NS with 40 mEq/L KCl @ 50 mL/hr    Plan:  1. TPN Formula:      Clinimix 5/15 E (1560 mL/day)              Lipids: 250 mL/day over 12 hours              Volume: 1560 mL (65 mL/hr over 24 hrs daily)       2. TPN will provide:    Lipids: 500 kcal   Protein: 78 g = 312 kcal  Dextrose: 234 g =  795.6 kcal   Total: 1607.6 kcal    3. Electrolyte replacements can be ordered as  needed.     4. Consult placed 4/29 with concerns of low thiamine and folate levels.   Thiamine level drawn 4/26 resulted this morning slightly above normal limits. Discontinue previously ordered Thiamine supplementation.     Low Folate levels: Will add Folic acid to TPN to provide 1 mg daily.      Multivitamin injection shortage. Continue Multivitamin and minerals liquid  15 mL po daily.     4. Pharmacy will continue to follow closely and make adjustments to TPN as necessary.    ANGELICA Murphy PharmWARREN  4/30/2023  12:30 CDT

## 2023-04-30 NOTE — PROGRESS NOTES
[facility] Medicine Services  INPATIENT PROGRESS NOTE    Patient Name: Santiago Mcgraw  Date of Admission: 4/25/2023  Today's Date: 04/30/23  Length of Stay: 1  Primary Care Physician: Angelo Mckinnon APRN    Subjective   Chief Complaint: Gout flare  HPI   Patient reports that he feels significantly better.  He reports that he only received 1 dose of Toradol in addition to the 1 dose of IV steroid.  He reports that he is able to walk on his left foot today, and not experiencing near the amount of pain that he was experiencing yesterday or the day prior.  Continues to have swelling in both of his lower extremities.  All in all reports that he is doing well, and mention that Dr. Luo may be discharging him home tomorrow as well.    Review of Systems   All pertinent negatives and positives are as above. All other systems have been reviewed and are negative unless otherwise stated.     Objective    Temp:  [97.8 °F (36.6 °C)-98.6 °F (37 °C)] 97.8 °F (36.6 °C)  Heart Rate:  [69-81] 69  Resp:  [16] 16  BP: (112-142)/(53-82) 112/72  Physical Exam  Vitals reviewed.   Constitutional:       Appearance: He is not toxic-appearing.   HENT:      Head: Normocephalic.      Mouth/Throat:      Mouth: Mucous membranes are moist.   Pulmonary:      Effort: Pulmonary effort is normal. No respiratory distress.   Musculoskeletal:      Right lower leg: Edema present.      Left lower leg: Edema present.      Comments: Improved TTP left foot/ankle   Neurological:      General: No focal deficit present.      Mental Status: He is alert.   Psychiatric:         Mood and Affect: Mood normal.         Results Review:  I have reviewed the labs, radiology results, and diagnostic studies.    Laboratory Data:   Results from last 7 days   Lab Units 04/27/23  0813 04/27/23  0602 04/25/23  1531   WBC 10*3/mm3 7.13 7.78 13.34*   HEMOGLOBIN g/dL 10.2* 10.7* 12.6*   HEMATOCRIT % 32.2* 33.5* 38.8   PLATELETS  10*3/mm3 185 205 230        Results from last 7 days   Lab Units 04/30/23  0526 04/29/23  1530 04/29/23  1359 04/28/23  0434 04/27/23  0813   SODIUM mmol/L 139 140  --  139 139   POTASSIUM mmol/L 4.4 4.3  --  4.0 4.0   CHLORIDE mmol/L 106 104  --  104 105   CO2 mmol/L 26.0 29.0  --  23.0 25.0   BUN mg/dL 10 8  --  8 9   CREATININE mg/dL 0.80 0.94  --  0.95 0.98   CALCIUM mg/dL 9.0 8.7  --  8.4* 8.3*   BILIRUBIN mg/dL  --   --  0.5 0.3 0.4   ALK PHOS U/L  --   --  51 56 62   ALT (SGPT) U/L  --   --  10 13 14   AST (SGOT) U/L  --   --  8 8 7   GLUCOSE mg/dL 141* 103*  --  119* 119*       Culture Data:   No results found for: BLOODCX, URINECX, WOUNDCX, MRSACX, RESPCX, STOOLCX    Radiology Data:   Imaging Results (Last 24 Hours)     Procedure Component Value Units Date/Time    XR Chest PA & Lateral [[id number]] Collected: 04/29/23 1309     Updated: 04/29/23 1313    Narrative:      EXAMINATION: Chest 2 views 04/29/2023     Comparison: None available     HISTORY:  Previous abnormal CT chest.     PA and lateral chest: Upright frontal and lateral projections of the  chest demonstrate no evidence of acute cardiopulmonary disease. The  mediastinal contours are within normal limits. There is no  evidence of  pleural effusion or free air.                                                                                                                              Impression:      Impression: No evidence of acute cardiopulmonary disease.                                               This report was finalized on 04/29/2023 13:09 by Dr. Nadeem Urrutia MD.          I have reviewed the patient's current medications.     Assessment/Plan   Assessment  Active Hospital Problems    Diagnosis    • **Nausea & vomiting    • Gout flare    • Lower extremity edema        Treatment Plan  1.  IV Toradol PRN  2.  Administered dose of IV methylprednisolone x1 yesterday.  Between the NSAIDs and dose of steroids patient has had significant  improvement in symptoms.  3.  Somewhat of a difficult situation as the standard go-to medications for gout flare are NSAIDS, glucocorticoids, and colchicine.  All of these medications can have potential impact on the GI tract and in this scenario the situation is also more complicated by recent gastric bypass surgery.  4.  Uric acid on 4/13 was 13.0.  Uric acid on 4/28 was 5.8.  5.  He may benefit from urate lowering therapy at some point, such as allopurinol, however initiation of these medications can precipitate a gout flare early on.  It would be beneficial if patient was able to tolerate either colchicine or NSAID as more of a prophylactic measure while initiating urate lower medications.  Given that his uric acid is less than 6, I lean towards not starting urate lowering medication at this time.  6.  We will have nutrition provide dietary education for strategies to reduce gout flares  7.  For his lower extremity edema I am going to give him a low-dose of IV Lasix x1  8.  Elevate lower extremities while at rest    Medical Decision Making  Number and Complexity of problems: Moderate complexity; management of acute gout flare and patient with recent sleeve gastrectomy procedure with ongoing nausea, vomiting, diarrhea; requiring total parenteral nutrition and now restarted on a p.o. diet      Conditions and Status        Condition is improving.     MDM Data  External documents reviewed: none  Cardiac tracing (EKG, telemetry) interpretation: none  Radiology interpretation: none  Labs reviewed: as above  Any tests that were considered but not ordered: none     Decision rules/scores evaluated (example [id number], Wells, etc): none     Discussed with: patient     Care Planning  Shared decision making: Discussed with patient with agreement to proceed with treatment plan as outlined  Code status and discussions: Full code    Disposition  Social Determinants of Health that impact treatment or disposition: None  apparent at this time  I expect the patient to be discharged to home when medically ready per Dr. Luo.    Electronically signed by Marcell Schaeffer MD, 04/30/23, 09:31 CDT.

## 2023-04-30 NOTE — PROGRESS NOTES
"Patient Care Team:  Angelo Mckinnon APRN as PCP - General (Family Medicine)  Francie Cabrera APRN as Nurse Practitioner (Nurse Practitioner)    Subjective     Santiago Mcgraw is several months ago from lap sleeve gastrectomy with subsequent gastroenteritis presumed now secondary to viral infection.  Possible concerns for COVID long-term effects.  Patient currently stable and nutrition supplementation improving his nutritional status.  He is tolerating oral intake.  Of note he did not receive a prepared dinner last night due to an inadvertent n.p.o. order which was corrected.  He did tolerate chicken breast which was lean and this was provided to him by his wife.  This a.m. he also did not receive a formal breakfast due to confusion of the dietary services per his statement.  He has since tolerated his lunch fortunately and received his lunch fortunately.  He states his energy is better.  He states his gout has improved with the Anabaptism of Toradol and the dose of steroid.  He has been ambulating much more.  He denies any respiratory symptoms, fevers chills or nausea and vomiting..       Review of Systems:     Review of Systems - General ROS: negative  Respiratory ROS: no cough, shortness of breath, or wheezing  Cardiovascular ROS: no chest pain or dyspnea on exertion  Gastrointestinal ROS: no abdominal pain, change in bowel habits, or black or bloody stools    Objective     Vital Signs  /72 (BP Location: Right arm, Patient Position: Sitting)   Pulse 69   Temp 97.8 °F (36.6 °C) (Oral)   Resp 16   Ht 182.9 cm (72\")   Wt (!) 151 kg (332 lb)   SpO2 100%   BMI 45.03 kg/m²     Physical Exam:    HEENT: sclera clear, anicteric  Respiratory: appears well, vitals normal, no respiratory distress, acyanotic, normal RR, chest clear, no wheezing, crepitations, rhonchi, normal symmetric air entry  Cardiovascular: Regular rate and rhythm, S1, S2 normal, no murmur, click, rub or gallop  GI: Soft, non-tender, " normal bowel sounds; no bruits, organomegaly or masses.  Abnormal shape: obese  Neurologic: alert, oriented, normal speech, no focal findings or movement disorder noted     Results Review:    Labs reviewed    Medication Reviewed:   Current Facility-Administered Medications   Medication Dose Route Frequency Provider Last Rate Last Admin   • Adult Standard Central TPN   Intravenous Q24H (TPN) Solitario Luo MD 65 mL/hr at 04/29/23 1825 New Bag at 04/29/23 1825   • Adult Standard Central TPN   Intravenous Q24H (TPN) Solitario Luo MD       • amoxicillin-clavulanate (AUGMENTIN) 875-125 MG per tablet 1 tablet  1 tablet Oral Q12H Ramandeep Malone MD   1 tablet at 04/30/23 0827   • Fat Emulsion Plant Based (INTRALIPID,LIPOSYN) 20 % infusion 50 g  250 mL Intravenous Q24H (TPN) Solitario Luo MD       • iopamidol (ISOVUE-300) 61 % injection 50 mL  50 mL Oral Once in imaging Solitario Luo MD       • ketorolac (TORADOL) injection 30 mg  30 mg Intravenous Q6H PRN Solitario Luo MD   30 mg at 04/30/23 1100   • metoclopramide (REGLAN) tablet 10 mg  10 mg Oral TID AC Ramandeep Malone MD   10 mg at 04/30/23 1100   • multivitamin and minerals liquid 15 mL  15 mL Oral Daily Solitario Luo MD   15 mL at 04/30/23 0827   • ondansetron ODT (ZOFRAN-ODT) disintegrating tablet 8 mg  8 mg Oral Q8H Ramandeep Malone MD   8 mg at 04/30/23 1453   • Pharmacy Consult - Pharmacy to dose   Does not apply Continuous PRN Solitario Luo MD       • Pharmacy to Dose TPN   Does not apply Continuous PRN Solitario Luo MD       • sodium chloride 0.9 % flush 10 mL  10 mL Intravenous Q12H Soiltario Luo MD   10 mL at 04/29/23 2213   • sodium chloride 0.9 % flush 10 mL  10 mL Intravenous PRN Solitario Luo MD   10 mL at 04/29/23 2212   • sodium chloride 0.9 % flush 10 mL  10 mL Intravenous Q12H Solitario Luo MD   10 mL at 04/29/23 2212   • sodium chloride 0.9 % flush 10 mL  10 mL Intravenous Q12H Solitario uLo MD    10 mL at 04/29/23 2208   • sodium chloride 0.9 % flush 10 mL  10 mL Intravenous PRN Solitario Luo MD       • sodium chloride 0.9 % flush 20 mL  20 mL Intravenous PRSolitario Rehman MD       • sodium chloride 0.9 % infusion 40 mL  40 mL Intravenous PRSolitario Rehman MD       • sodium chloride 0.9 % infusion 40 mL  40 mL Intravenous Solitario Rivas MD           Assessment & Plan  POD about a month ago from bariatric surgery, lap sleeve gastrectomy.  He presented with malnutrition and dehydration secondary to enteritis presumed viral in nature.  He has since been stable.  He has been tolerating his peripheral nutrition as well as his oral nutrition more steadily.  He has been ambulating much more and states he has more energy.  His labs have remained stable, his chest x-ray showed no evidence of atelectasis or pleural effusion and he has been saturating in the high 90s.  Plan will be to be discharged home soon on PPN, home health PICC line care.      Solitario Luo MD  04/30/23  16:34 CDT

## 2023-05-01 VITALS
HEIGHT: 72 IN | OXYGEN SATURATION: 100 % | TEMPERATURE: 98.5 F | HEART RATE: 74 BPM | SYSTOLIC BLOOD PRESSURE: 127 MMHG | WEIGHT: 315 LBS | DIASTOLIC BLOOD PRESSURE: 90 MMHG | RESPIRATION RATE: 16 BRPM | BODY MASS INDEX: 42.66 KG/M2

## 2023-05-01 LAB
A-TOCOPHEROL VIT E SERPL-MCNC: 8 MG/L (ref 7–25.1)
ALBUMIN SERPL-MCNC: 3.2 G/DL (ref 3.5–5.2)
ALBUMIN/GLOB SERPL: 1.5 G/DL
ALP SERPL-CCNC: 54 U/L (ref 39–117)
ALT SERPL W P-5'-P-CCNC: 14 U/L (ref 1–41)
ANION GAP SERPL CALCULATED.3IONS-SCNC: 8 MMOL/L (ref 5–15)
ANION GAP SERPL CALCULATED.3IONS-SCNC: 9 MMOL/L (ref 5–15)
AST SERPL-CCNC: 13 U/L (ref 1–40)
BILIRUB CONJ SERPL-MCNC: <0.2 MG/DL (ref 0–0.3)
BILIRUB SERPL-MCNC: 0.3 MG/DL (ref 0–1.2)
BUN SERPL-MCNC: 13 MG/DL (ref 6–20)
BUN SERPL-MCNC: 14 MG/DL (ref 6–20)
BUN/CREAT SERPL: 13.8 (ref 7–25)
BUN/CREAT SERPL: 13.9 (ref 7–25)
CA-I BLD-MCNC: 4.52 MG/DL (ref 4.6–5.4)
CALCIUM SPEC-SCNC: 8 MG/DL (ref 8.6–10.5)
CALCIUM SPEC-SCNC: 8.4 MG/DL (ref 8.6–10.5)
CHLORIDE SERPL-SCNC: 104 MMOL/L (ref 98–107)
CHLORIDE SERPL-SCNC: 105 MMOL/L (ref 98–107)
CO2 SERPL-SCNC: 25 MMOL/L (ref 22–29)
CO2 SERPL-SCNC: 27 MMOL/L (ref 22–29)
CREAT SERPL-MCNC: 0.94 MG/DL (ref 0.76–1.27)
CREAT SERPL-MCNC: 1.01 MG/DL (ref 0.76–1.27)
CRP SERPL-MCNC: 0.92 MG/DL (ref 0–0.5)
EGFRCR SERPLBLD CKD-EPI 2021: 100 ML/MIN/1.73
EGFRCR SERPLBLD CKD-EPI 2021: 91.7 ML/MIN/1.73
GAMMA TOCOPHEROL SERPL-MCNC: 0.5 MG/L (ref 0.5–5.5)
GLOBULIN UR ELPH-MCNC: 2.1 GM/DL
GLUCOSE BLDC GLUCOMTR-MCNC: 107 MG/DL (ref 70–130)
GLUCOSE SERPL-MCNC: 110 MG/DL (ref 65–99)
GLUCOSE SERPL-MCNC: 114 MG/DL (ref 65–99)
INR PPP: 1.11 (ref 0.91–1.09)
Lab: ABNORMAL
MAGNESIUM SERPL-MCNC: 2 MG/DL (ref 1.6–2.6)
PHOSPHATE SERPL-MCNC: 4.3 MG/DL (ref 2.5–4.5)
POTASSIUM SERPL-SCNC: 3.8 MMOL/L (ref 3.5–5.2)
POTASSIUM SERPL-SCNC: 3.9 MMOL/L (ref 3.5–5.2)
PREALB SERPL-MCNC: 16.9 MG/DL (ref 20–40)
PROT SERPL-MCNC: 5.3 G/DL (ref 6–8.5)
PROTHROMBIN TIME: 14.4 SECONDS (ref 11.8–14.8)
SODIUM SERPL-SCNC: 139 MMOL/L (ref 136–145)
SODIUM SERPL-SCNC: 139 MMOL/L (ref 136–145)
TRIGL SERPL-MCNC: 136 MG/DL (ref 0–150)
VIT A SERPL-MCNC: 10.8 UG/DL (ref 20.1–62)

## 2023-05-01 PROCEDURE — 86140 C-REACTIVE PROTEIN: CPT | Performed by: SURGERY

## 2023-05-01 PROCEDURE — 63710000001 ONDANSETRON PER 8 MG: Performed by: INTERNAL MEDICINE

## 2023-05-01 PROCEDURE — 83735 ASSAY OF MAGNESIUM: CPT | Performed by: SURGERY

## 2023-05-01 PROCEDURE — 85610 PROTHROMBIN TIME: CPT | Performed by: SURGERY

## 2023-05-01 PROCEDURE — 82248 BILIRUBIN DIRECT: CPT | Performed by: SURGERY

## 2023-05-01 PROCEDURE — 84134 ASSAY OF PREALBUMIN: CPT | Performed by: SURGERY

## 2023-05-01 PROCEDURE — 82948 REAGENT STRIP/BLOOD GLUCOSE: CPT

## 2023-05-01 PROCEDURE — G0378 HOSPITAL OBSERVATION PER HR: HCPCS

## 2023-05-01 PROCEDURE — 80053 COMPREHEN METABOLIC PANEL: CPT | Performed by: SURGERY

## 2023-05-01 PROCEDURE — 82330 ASSAY OF CALCIUM: CPT

## 2023-05-01 PROCEDURE — 84478 ASSAY OF TRIGLYCERIDES: CPT | Performed by: SURGERY

## 2023-05-01 PROCEDURE — 84100 ASSAY OF PHOSPHORUS: CPT | Performed by: SURGERY

## 2023-05-01 RX ADMIN — ASCORBIC ACID, THIAMINE, RIBOFLAVIN, NIACINAMIDE, PYRIDOXINE, FOLIC ACID, COBALAMIN, BIOTIN, PANTOTHENIC ACID 15 ML: 100; 1.5; 1.7; 20; 10; 1; 6; 300; 1 TABLET, COATED ORAL at 08:19

## 2023-05-01 RX ADMIN — METOCLOPRAMIDE 10 MG: 10 TABLET ORAL at 08:19

## 2023-05-01 RX ADMIN — METOCLOPRAMIDE 10 MG: 10 TABLET ORAL at 12:12

## 2023-05-01 RX ADMIN — Medication 10 ML: at 08:19

## 2023-05-01 RX ADMIN — ONDANSETRON 8 MG: 8 TABLET, ORALLY DISINTEGRATING ORAL at 05:31

## 2023-05-01 RX ADMIN — AMOXICILLIN AND CLAVULANATE POTASSIUM 1 TABLET: 875; 125 TABLET, FILM COATED ORAL at 08:19

## 2023-05-01 NOTE — PLAN OF CARE
"Goal Outcome Evaluation:              Outcome Evaluation: SHIRA lloyd follow-up completed.  Pt's po intake was 100% this morning @ Breakfast.  Pt indicates he has been told he will d/c today.  Will be receiving TPN @ home as well as a bariatric diet.  Provided written diet informaton regarding a \"gout\" diet along with RD's name and office phone number in the event of further questions.  "

## 2023-05-01 NOTE — PROGRESS NOTES
"Pharmacy Parenteral Nutrition Daily Evaluation - Sign Off    Santiago Mcgraw is a  47 y.o. male that pharmacy has been consulted for TPN management for post-op laparoscopic sleeve gastrectomy with continued nausea/vomiting, concern for nutritional depletion of protein,Low thiamine and folate levels per Dr. Luo' request.  [Ht: 182.9 cm (72\"); Wt: (!) 151 kg (332 lb)]  Admission Date: 425      Subjective:  Progress notes reviewed. Plans for discharge today. No further TPN Orders Planned for today.     Objective:  Temp (24hrs), Av °F (36.7 °C), Min:97.7 °F (36.5 °C), Max:98.5 °F (36.9 °C)        Results from last 7 days   Lab Units 23  0642 23  0528 23  0526 23  1530 23  1359 23  0434 23  0813   SODIUM mmol/L 139 139 139 140  --  139 139   POTASSIUM mmol/L 3.9 3.8 4.4 4.3  --  4.0 4.0   CHLORIDE mmol/L 104 105 106 104  --  104 105   MAGNESIUM mg/dL  --  2.0 2.1 1.9  --   --   --    PHOSPHORUS mg/dL  --  4.3 3.3 3.3  --   --   --    CO2 mmol/L 27.0 25.0 26.0 29.0  --  23.0 25.0   TOTAL PROTEIN g/dL 5.3*  --   --   --  5.0* 5.2* 5.4*   BUN mg/dL 13 14 10 8  --  8 9   CREATININE mg/dL 0.94 1.01 0.80 0.94  --  0.95 0.98   CALCIUM mg/dL 8.4* 8.0* 9.0 8.7  --  8.4* 8.3*   PREALBUMIN mg/dL 16.9*  --   --   --   --   --  9.3*   ALBUMIN g/dL 3.2*  --   --   --  2.9* 2.9* 2.9*   BILIRUBIN mg/dL 0.3  --   --   --  0.5 0.3 0.4   ALK PHOS U/L 54  --   --   --  51 56 62   ALT (SGPT) U/L 14  --   --   --  10 13 14   AST (SGOT) U/L 13  --   --   --  8 8 7   GLUCOSE mg/dL 114* 110* 141* 103*  --  119* 119*       Triglycerides   Date Value Ref Range Status   2023 136 0 - 150 mg/dL Final       Corrected Ca (Calcium + (0.8 * (4-albumin)) = 9.04    Results from last 7 days   Lab Units 23  0813 23  0602 23  1531   WBC 10*3/mm3 7.13 7.78 13.34*   HEMOGLOBIN g/dL 10.2* 10.7* 12.6*   HEMATOCRIT % 32.2* 33.5* 38.8   PLATELETS 10*3/mm3 185 205 230       estimated " creatinine clearance is 145.4 mL/min (by C-G formula based on SCr of 0.94 mg/dL).    Intake & Output (last 3 days)       04/28 0701 04/29 0700 04/29 0701 04/30 0700 04/30 0701 05/01 0700 05/01 0701 05/02 0700    P.O. 240 240 240 600    I.V. (mL/kg) 880 (5.8) 2018.9 (13.4)      IV Piggyback  250      TPN 2895 1858 1504.8 1384.5    Total Intake(mL/kg) 4015 (26.6) 4366.9 (28.9) 1744.8 (11.6) 1984.5 (13.1)    Urine (mL/kg/hr)   200 (0.1)     Stool   0     Total Output   200     Net +4015 +4366.9 +1544.8 +1984.5            Urine Unmeasured Occurrence 9 x 2 x 2 x 1 x    Stool Unmeasured Occurrence 1 x 1 x 1 x           Above labs reviewed.     Dietitian Recommendations  Diet Orders (active) (From admission, onward)     Start     Ordered    04/30/23 1003  Diet: Regular/House Diet; Bariatric Stage 5; Texture: Regular Texture (IDDSI 7); Fluid Consistency: Thin (IDDSI 0)  Diet Effective Now         04/30/23 1002    04/27/23 1600  DIET MESSAGE Please send Orange Juice on every tray (TID) and one now. HILDA Jimenes  3 Times Daily      Comments: Please send Orange Juice on every tray (TID) and one now. HILDA Jimenes    04/27/23 1026                Current TPN Regimen Recommendation: Last bag was started 4/30/23 as Dextrose 15% / Amino Acid 5% at goal rate of 65 mL/hr.  20% Lipid Emulsion 250 mL every 24 hours.    Daily Assessment:  TPN Therapy Day 6    TPN Medication Recent History (Show up to 4 orders; newest on the left. Changes between the two most recent orders are indicated.)     Start date and time    04/30/2023 1800  04/29/2023 1800  04/28/2023 1800  04/27/2023 1800      Adult Standard Central TPN [[id number]] Adult Standard Central TPN [[id number]] Adult Standard Central TPN [[id number]] Adult Standard Central TPN [[id number]]    Order Status  Completed Completed Completed Completed    Last Admin  New Bag at 04/30/2023 1734 by Anne Allen, RN New Bag at 04/29/2023 1825 by Jayy Lee, SARAH Currently  Infusing at 2023 0703 by Aliza Melgar RN New Bag at 2023 1728 by Esther Plummer RN    Frequency  Q24H (TPN) Q24H (TPN) Q24H (TPN) Q24H (TPN)       Additives    multivitamin (adult)  -- -- 10 mL --    Trace Minerals Cu-Mn-Se-Zn  -- -- 1 mL --    folic acid  1.28 mg -- -- --       Amino Acids in Dextrose Premix    amino acids 5% - electrolytes in dextrose 15%  2,000 mL 2,000 mL 2,000 mL 2,000 mL       Energy Contribution    Proteins  400 kcal 400 kcal 400 kcal 400 kcal    Dextrose  1,020 kcal 1,020 kcal 1,020 kcal 1,020 kcal    Lipids  -- -- -- --    Total  1,420 kcal 1,420 kcal 1,420 kcal 1,420 kcal       Electrolyte Ion Calculated Amount    Sodium  70 mEq 70 mEq 70 mEq 70 mEq    Potassium  60 mEq 60 mEq 60 mEq 60 mEq    Calcium  9 mEq 9 mEq 9 mEq 9 mEq    Magnesium  10 mEq 10 mEq 10 mEq 10 mEq    Aluminum  -- -- -- --    Phosphate  30 mmol 30 mmol 30 mmol 30 mmol    Chloride  78 mEq 78 mEq 78 mEq 78 mEq    Acetate  160 mEq 160 mEq 160 mEq 160 mEq    Chloride: Acetate Ratio  0.49 0.49 0.49 0.49       Other    Total Amino Acid  100 g 100 g 100 g 100 g    Total Amino Acid/kg  0.66 g/kg 0.66 g/kg 0.68 g/kg 0.67 g/kg    Glucose Infusion Rate  1.08 mg/kg/min 1.08 mg/kg/min 1.11 mg/kg/min 1.68 mg/kg/min    Osmolarity  1,398.79 1,399 1,391.35 1,399    Volume  2,000.3 mL 2,000 mL 2,011 mL 2,000 mL    Rate  65 mL/hr 65 mL/hr 65 mL/hr 100 mL/hr    Dosing Weight  151 kg 151 kg 146 kg 149 kg    Infusion Site  [facility]           Additional insulin administration while previous TPN infusin units  Additional electrolyte administration while previous TPN infusing: D5NS with 40 mEq/L KCl @ 50 mL/hr    Plan:    Received call from SARAH Rodríguez. TPN will stop today as the current plans are for patient to discharge today and receive a home supply of TPN starting tomorrow through an outside source. No additional TPN to be ordered tonight.     While here, Electrolyte replacements can be ordered as  needed.     Pharmacy will sign off for TPN management - order for discharge exists.       Gopal Kirkland, PharmWARREN  5/1/2023  15:27 CDT

## 2023-05-01 NOTE — CASE MANAGEMENT/SOCIAL WORK
"Continued Stay Note  [facility]     Patient Name: Santiago Mcgraw  MRN: [id number]  Today's Date: 5/1/2023    Admit Date: 4/25/2023    Plan: Home Health   Discharge Plan     Row Name 05/01/23 1149       Plan    Plan Home Health    Plan Comments Spoke with Ruth from [facility] [phone] and they will be able to deliver pt's TPN tomorrow am. She says they consider it TPN and not PPN because it will go through the PICC line. She is needed to know a length of need and an order that says \"[facility] to manage TPN\". Have sent a message to Dr. Luo asking how long he is anticipating the TPN. [facility] and waiting to see if they can accept pt.    Row Name 05/01/23 0945       Plan    Plan Home    Plan Comments Spoke with Ruth at [facility] [phone] and she is working on pt's order. She will need to know who pt's home health will be so working on that. Faxed her updates to [phone].               Discharge Codes    No documentation.               Expected Discharge Date and Time     Expected Discharge Date Expected Discharge Time    Apr 28, 2023             ARAVIND Chu    "

## 2023-05-01 NOTE — CASE MANAGEMENT/SOCIAL WORK
Continued Stay Note  [facility]OM[facility] Perdomo     Patient Name: Santiago Mcgraw  MRN: [id number]  Today's Date: 5/1/2023    Admit Date: 4/25/2023    Plan: Home   Discharge Plan     Row Name 05/01/23 0945       Plan    Plan Home    Plan Comments Spoke with Ruth at [facility] [phone] and she is working on pt's order. She will need to know who pt's home health will be so working on that. Faxed her updates to [phone].               Discharge Codes    No documentation.               Expected Discharge Date and Time     Expected Discharge Date Expected Discharge Time    Apr 28, 2023             ARAVIND Chu

## 2023-05-01 NOTE — CASE MANAGEMENT/SOCIAL WORK
"Continued Stay Note  [facility]     Patient Name: Santiago Mcgraw  MRN: [id number]  Today's Date: 5/1/2023    Admit Date: 4/25/2023    Plan: Home with [facility]   Discharge Plan     Row Name 05/01/23 1354       Plan    Plan Home with [facility]    Patient/Family in Agreement with Plan yes    Final Discharge Disposition Code 06 - home with home health care    Final Note TPN has been approved and will be delivered by [facility] to his home late tomorrow morning, per Ruth at [facility] [phone]. Have talked with [facility] [phone] and they will see pt tomorrow afternoon. Pt is to call them when the TPN is delivered. Updated pt. He will d/c home today.    Row Name 05/01/23 1367       Plan    Plan Home Health    Plan Comments Spoke with Ruth from [facility] [phone] and they will be able to deliver pt's TPN tomorrow am. She says they consider it TPN and not PPN because it will go through the PICC line. She is needed to know a length of need and an order that says \"[facility] to manage TPN\". Have sent a message to Dr. Luo asking how long he is anticipating the TPN. Orders have been sent to [facility] and waiting to see if they can accept pt.               Discharge Codes    No documentation.               Expected Discharge Date and Time     Expected Discharge Date Expected Discharge Time    Apr 28, 2023             ARAVIND Chu    "

## 2023-05-01 NOTE — PLAN OF CARE
Goal Outcome Evaluation:  Plan of Care Reviewed With: patient        Progress: improving  Outcome Evaluation: IVF DC'd;  pt tolerating diet and TPN;  no c/o pain, safety maintained

## 2023-05-02 ENCOUNTER — DOCUMENTATION (OUTPATIENT)
Dept: BARIATRICS/WEIGHT MGMT | Facility: CLINIC | Age: 48
End: 2023-05-02
Payer: COMMERCIAL

## 2023-05-02 ENCOUNTER — TELEPHONE (OUTPATIENT)
Dept: FAMILY MEDICINE CLINIC | Age: 48
End: 2023-05-02

## 2023-05-02 ENCOUNTER — READMISSION MANAGEMENT (OUTPATIENT)
Dept: CALL CENTER | Facility: HOSPITAL | Age: 48
End: 2023-05-02
Payer: COMMERCIAL

## 2023-05-02 DIAGNOSIS — U07.1 GASTROENTERITIS DUE TO COVID-19 VIRUS: ICD-10-CM

## 2023-05-02 DIAGNOSIS — G47.33 OBSTRUCTIVE SLEEP APNEA SYNDROME: ICD-10-CM

## 2023-05-02 DIAGNOSIS — K59.1 FUNCTIONAL DIARRHEA: ICD-10-CM

## 2023-05-02 DIAGNOSIS — A08.39 GASTROENTERITIS DUE TO COVID-19 VIRUS: ICD-10-CM

## 2023-05-02 DIAGNOSIS — Z98.84 STATUS POST LAPAROSCOPIC SLEEVE GASTRECTOMY: ICD-10-CM

## 2023-05-02 DIAGNOSIS — M10.9 ACUTE GOUT OF LEFT FOOT, UNSPECIFIED CAUSE: ICD-10-CM

## 2023-05-02 DIAGNOSIS — K52.9 GASTROENTERITIS: Primary | ICD-10-CM

## 2023-05-02 NOTE — DISCHARGE PLACEMENT REQUEST
[address] [address]  Phone:  [phone]  Fax:          Patient: ROOM: 381-1   Santiago Hough MRN:  [id number]   [address] [place] [id number] :  1975  SSN:    Phone: [phone] Sex:  M   PCP: Angelo Mckinnon                Emergency Contact Information      Name Relation Home Work Mobile     CARLOS HOUGH Spouse [phone]              INSURANCE PA[employer] # SUBSCRIBER ID   Primary:    DEIDRA ARGUETA [id number]   Admitting Diagnosis: Dehydration [E86.0]  Order Date:  2023        Case Management  Consult       (Order ID: [id number])     Diagnosis:         Priority:  Routine Expected Date:   Expiration Date:        Interval:  Once Count:    Is discharge planning needed? If yes, who is requesting discharge planning? Provider  Reason for Consult: Home TPN recommendations: Dextrose 15%, Amino Acids 5% at 65mL/hour with 20% lipid emulsion, 250mL, daily        Verbal Order Mode: Telephone with readback   Authorizing Provider: Solitario Luo MD  Authorizing Provider's NPI: [id number]     Order Entered By: Kianna Jeong RN 2023  3:41 PM     Electronically signed by: Solitario Luo MD 2023  7:40 AM

## 2023-05-02 NOTE — PROGRESS NOTES
Date of Discharge: May first 2023    Discharge Diagnosis: Gastroenteritis causing nausea vomiting as well as diarrhea, protein and vitamin malnutrition      Presenting Problem/History of Present Illness:   Patient Active Problem List   Diagnosis   • Hypertension   • Sleep apnea   • Class 3 severe obesity due to excess calories with serious comorbidity and body mass index (BMI) of 40.0 to 44.9 in adult   • History of heartburn   • Status post laparoscopic sleeve gastrectomy   • Nausea and vomiting   • Gastroenteritis due to COVID-19 virus   • Gastroenteritis   • Vomiting   • Nausea & vomiting   • Acute pain of left thigh   • Functional diarrhea   • Gout flare   • Lower extremity edema         Hospital Course  Patient is a 47 y.o. male presented with the above problem list.  The patient had been admitted due to recurrence of his symptoms of nausea and vomiting and the added diarrhea.  He presented with hypo-kalemia as well as concern for protein and vitamin malnutrition.  He was admitted and had a PICC line placed for peripheral nutritional supplementation.  I also consulted gastroenterology due to the the patient's chronic issues of gastritis/possible enteritis.  He did have a recent history of COVID infection which would have been his third time.  This last time did start with symptoms of GI in nature.  Gastroenterology reviewed his CT scan as well as previous x-rays and studies.  There was a moment during the hospital stay that the patient requested a second opinion and possible transfer to [place].  This attempt was made however [facility] refused to take the patient due to no available beds at that time.  The gastroenterologist believed that this is enteritis in nature presumably viral in nature and was not convinced that this was an inflammatory bowel issue.  The patient also presented with a history of gout which recurred in his left foot.  This made it difficult for him to ambulate as well.  Hospitalist was  consulted and provided a steroid shot and continued with the Toradol medication for pain as tolerated.  This also helped improve his symptoms of gout.  Throughout his hospital stay the patient's energy is improved and his appetite returned.  He had less bouts of diarrhea and his potassium normalized with IV treatment.  Upon discharge the patient was explained he will require at least 2 weeks with his PICC line and possible continued PPN nutritional supplementation.  This will be monitored weekly through pharmacy and dietary services.  The patient will follow-up with us in 1 week's time as well.    Procedures Performed  PICC line    Consults:   Gastroenterology and hospitalist      Condition on Discharge:  Stable    Vital Signs:    Most Recent 11/4/22 - 5/2/23 04/27/23 04/28/23 04/29/23 04/30/23 05/01/23   /90  5/1/23 11:27 104/64 133/72 128/72 113/60 127/90   Note: Showing the most recent values for these dates. There are additional values that can be seen in Synopsis.        Physical Exam:    General Appearance:    Alert, cooperative, in no acute distress   Head:    Normocephalic, without obvious abnormality, atraumatic   Eyes:            Lids and lashes normal, conjunctivae and sclerae normal, no   icterus, no pallor, corneas clear, PERRLA   Ears:    Ears appear intact with no abnormalities noted   Throat:   No oral lesions, no thrush, oral mucosa moist   Neck:   No adenopathy, supple, trachea midline, no thyromegaly, no   carotid bruit, no JVD   Back:     No kyphosis present, no scoliosis present, no skin lesions,      erythema or scars, no tenderness to percussion or                   palpation,   range of motion normal   Lungs:     Clear to auscultation,respirations regular, even and                  unlabored    Heart:    Regular rhythm and normal rate, normal S1 and S2, no            murmur, no gallop, no rub, no click   Chest Wall:    No abnormalities observed   Abdomen:     Normal bowel sounds, no  masses, no organomegaly, soft        non-tender, non-distended, no guarding, no rebound                tenderness       Discharge Disposition stable      Discharge Medications     Discharge Medications          Accurate as of May 2, 2023 12:00 PM. If you have any questions, ask your nurse or doctor.            Continue These Medications      Instructions Start Date   buPROPion  MG 24 hr tablet  Commonly known as: WELLBUTRIN XL   150 mg, Oral, Every Morning      NON FORMULARY   1 each, Transdermal, Daily, Calcium patch (PatchAid)      NON FORMULARY   1 each, Transdermal, Daily, Vitamin B-12 patch (PatchAid)      NON FORMULARY   1 each, Transdermal, Daily, Multivitamin Patch (PatchAid)      ondansetron ODT 4 MG disintegrating tablet  Commonly known as: ZOFRAN-ODT   4 mg, Translingual, Every 8 Hours PRN      pantoprazole 40 MG EC tablet  Commonly known as: PROTONIX   40 mg, Oral, Daily      promethazine 12.5 MG tablet  Commonly known as: PHENERGAN   12.5 mg, Oral, Every 8 Hours PRN             Discharge Diet: Bariatric stage V and to advance as tolerated with continued vitamin supplementation.    Activity at Discharge: Advance as tolerated    Follow-up Appointments  Future Appointments   Date Time Provider Department Center   5/23/2023  9:30 AM Solitario Luo MD [facility] None   8/22/2023  9:00 AM Francie Cabrera APRN [facility] P[facility] None   2/26/2024  9:30 AM Solitario Luo MD [facility] PAD None     [unfilled]    Test Results Pending at Discharge  [unfilled]     Solitario Luo MD  05/02/23  12:00 CDT

## 2023-05-02 NOTE — TELEPHONE ENCOUNTER
Care Transitions Initial Follow Up Call    Outreach made within 2 business days of discharge: Yes    Patient: Anniece Harada Patient : 1975   MRN: [id number]  Reason for Admission: N&V s/p sleeve gastroectomy  Discharge Date: 23       Spoke with: Carlos    Discharge department/facility: [facility]        Scheduled appointment with PCP within 7-14 days    Follow Up  Future Appointments   Date Time Provider Jennifer Teran   2023  2:00 PM Nola Mitchell, [facility]       Baylee Sidhu, [address]

## 2023-05-02 NOTE — OUTREACH NOTE
Prep Survey    Flowsheet Row Responses   Taoist facility patient discharged from? [place]   Is LACE score < 7 ? No   Eligibility Readm Mgmt   Discharge diagnosis Recurrent nausea and vomiting   Does the patient have one of the following disease processes/diagnoses(primary or secondary)? Other   Does the patient have Home health ordered? Yes   What [facility] the [facility] agency?  [facility]    Is there a DME ordered? No   General alerts for this patient TPN per [facility]    Prep survey completed? Yes          Maeve GRIFFIN - Registered Nurse

## 2023-05-03 ENCOUNTER — PATIENT MESSAGE (OUTPATIENT)
Dept: BARIATRICS/WEIGHT MGMT | Facility: CLINIC | Age: 48
End: 2023-05-03
Payer: COMMERCIAL

## 2023-05-03 ENCOUNTER — TELEPHONE (OUTPATIENT)
Dept: BARIATRICS/WEIGHT MGMT | Facility: CLINIC | Age: 48
End: 2023-05-03
Payer: COMMERCIAL

## 2023-05-03 LAB
VIT B1 BLD-SCNC: 207.8 NMOL/L (ref 66.5–200)
VIT B1 BLD-SCNC: 235.8 NMOL/L (ref 66.5–200)

## 2023-05-03 RX ORDER — ONDANSETRON 4 MG/1
8 TABLET, ORALLY DISINTEGRATING ORAL EVERY 8 HOURS PRN
Qty: 30 TABLET | Refills: 1 | Status: SHIPPED | OUTPATIENT
Start: 2023-05-03

## 2023-05-03 NOTE — TELEPHONE ENCOUNTER
Care Transitions Initial Follow Up Call    Outreach made within 2 business days of discharge: Yes    Patient: Jakob Montes Patient : 1975   MRN: [id number]  Reason for Admission: There are no discharge diagnoses documented for the most recent discharge. Discharge Date: 21       Spoke with: no one.  LMTCB    Discharge department/facility: [facility]        Scheduled appointment with PCP within 7-14 days    Follow Up  Future Appointments   Date Time Provider Jennifer Teran   2023  2:00 PM Carlos Monreal, [address] [place] [address]

## 2023-05-03 NOTE — TELEPHONE ENCOUNTER
I spoke with Ruchi director of dietary services.  Ruchi states that from what she sees the dietitian's have completed their part.  Marisol Russell is a  on the case.  Ruchi is provided me with information to contact her to discuss further updates due to patient not receiving PPN at this time.  I have contacted Marisol and left a voicemail requesting her to return call.

## 2023-05-03 NOTE — CASE MANAGEMENT/SOCIAL WORK
Rec'd a call this am from pt's spouse, Radha [phone]. She has concerns because the nurse with [facility] came to their house yesterday and stated she had never done TPN before and was needing assistance from the supervisor. This was a big concern. She then said that the nurse told them that [facility] did not have amino acids mixed in and the order did call for it. She also stated it was supposed to infuse over 4 hours but home health said it would be 18 hours. Spoke with Siria with [facility] [phone] and she confirmed that they did mix in amino acids. Also she said the order said 75 mL/hr so that would equal 18 hours. Also spoke with Treasure at [facility] [phone] and assured her that the bag does have amino acids. She read off the ingredients which one was clinisol so informed her that clinisol is the amino acid per [facility]. Checked with Dr. Luo and he confirmed what pt is needing (which was a recommendation by the dietician earlier but not what was in the most recent recommendation so that is where the confusion was) so that new order has been sent to [facility]. Confirmed with Siria that they did receive it and since it will be a 4 hour infusion, they cannot do that. She is going to contact Dr. Luo' office and try to work it out. Updated pt's spouse as well.

## 2023-05-04 ENCOUNTER — OFFICE VISIT (OUTPATIENT)
Dept: FAMILY MEDICINE CLINIC | Age: 48
End: 2023-05-04

## 2023-05-04 VITALS
SYSTOLIC BLOOD PRESSURE: 110 MMHG | TEMPERATURE: 97.5 F | OXYGEN SATURATION: 99 % | BODY MASS INDEX: 44.89 KG/M2 | HEART RATE: 87 BPM | WEIGHT: 315 LBS | DIASTOLIC BLOOD PRESSURE: 75 MMHG

## 2023-05-04 DIAGNOSIS — Z98.890 S/P GASTRIC SURGERY: ICD-10-CM

## 2023-05-04 DIAGNOSIS — E44.0 MODERATE PROTEIN-CALORIE MALNUTRITION (HCC): ICD-10-CM

## 2023-05-04 DIAGNOSIS — Z09 HOSPITAL DISCHARGE FOLLOW-UP: ICD-10-CM

## 2023-05-04 DIAGNOSIS — R11.2 NAUSEA AND VOMITING, UNSPECIFIED VOMITING TYPE: Primary | ICD-10-CM

## 2023-05-04 RX ORDER — ONDANSETRON 4 MG/1
4 TABLET, ORALLY DISINTEGRATING ORAL EVERY 6 HOURS PRN
COMMUNITY
Start: 2023-05-03

## 2023-05-04 RX ORDER — PANTOPRAZOLE SODIUM 40 MG/1
40 TABLET, DELAYED RELEASE ORAL DAILY
COMMUNITY
Start: 2023-04-10

## 2023-05-04 RX ORDER — OXYCODONE AND ACETAMINOPHEN 7.5; 325 MG/1; MG/1
1 TABLET ORAL EVERY 6 HOURS PRN
COMMUNITY
Start: 2023-04-13

## 2023-05-04 NOTE — PROGRESS NOTES
Post-Discharge Transitional Care Management Progress Note      Solomon Washington   YOB: 1975    Date of Office Visit:  5/4/2023  Date of Hospital Admission: 04/25/2023  Date of Hospital Discharge: 05/01/2023    Care management risk score Rising risk (score 2-5) and Complex Care (Scores >=6): No Risk Score On File     Non face to face  following discharge, date last encounter closed (first attempt may have been earlier): 05/02/2023 05/02/2023    Call initiated 2 business days of discharge: Yes    ASSESSMENT/PLAN:   Nausea and vomiting, unspecified vomiting type  S/P gastric surgery  Moderate protein-calorie malnutrition [facility])      Medical Decision Making: moderate complexity  No follow-ups on file. Subjective:   HPI:  Follow up of Hospital problems/diagnosis(es): NV s/p gastric sleeve    Inpatient course: Discharge summary reviewed- see chart. Admitted at [facility] due to vomiting 1 month s/p gastric sleeve. Was started on TPN. Discharged with home health and have not had TPN since discharge. Home health is suppose to be starting this. He is suppose to be on this until May 15th. He has PICC line to left upper arm. He is eating and keeping it down. I had not kept anything down for last 3.5-4 weeks. GI said saw stuff in small intestine for COVID complications. Dx with covid from home test around 03/27. Have been to hospital 12 days the month of April. Interval history/Current status:   Feeling better. Keeping stuff down since discharged 05/01/2023. Weight is down 38 lbs since here last.   Gout has been better.        Patient Active Problem List   Diagnosis    Hypertension    Class 3 severe obesity due to excess calories without serious comorbidity with body mass index (BMI) of 50.0 to 59.9 in adult [facility])    SCOTT (obstructive sleep apnea)       Medications listed as ordered at the time of discharge from hospital     Medication List            Accurate as of May 4, 2023  2:38 PM. If you have

## 2023-05-05 NOTE — PAYOR COMM NOTE
"[id number]  [place] [facility] 5-1-23      Santiago Hough (48 y.o. Male)     Date of Birth   1975    Social Security Number       Address   [address] [place] [address]    Home Phone   [phone]    MRN   [id number]       Gnosticist   [facility]    Marital Status                               Admission Date   4/25/23    Admission Type   Urgent    Admitting Provider   Solitario Luo MD    Attending Provider       Department, Room/Bed   [facility] 3C, 381/1       Discharge Date   5/1/2023    Discharge Disposition   Home or Self Care    Discharge Destination                               Attending Provider: (none)   Allergies: Adhesive Tape, Latex    Isolation: None   Infection: None   Code Status: Prior    Ht: 182.9 cm (72\")   Wt: 151 kg (332 lb)    Admission Cmt: None   Principal Problem: Nausea & vomiting [R11.2]                 Active Insurance as of 4/25/2023     Primary Coverage     Payor Plan Insurance Group Employer/Plan Group    [facility] [employer] BLUE CR[employer] [id number]     Payor Plan Address Payor Plan Phone Number Payor Plan Fax Number Effective Dates    [address] [phone]  1/1/2015 - None Entered    [place] [address]       Subscriber Name Subscriber Birth Date Member ID       CARLOS HOUGH [birth year]/29/1976 [id number]                 Emergency Contacts      (Rel.) Home Phone Work Phone Mobile Phone    CARLOS HOUGH (Spouse) [phone] -- --            Discharge Summary    No notes of this type exist for this encounter.         "

## 2023-05-09 LAB — RETINOL BIND PROT SERPL-MCNC: 2.3 MG/DL (ref 1.6–6.1)

## 2023-05-09 NOTE — DISCHARGE SUMMARY
"  Date of Discharge:  5/9/2023    Discharge Diagnosis: Dehydration [E86.0]  Nausea & vomiting [R11.2]    Presenting Problem/History of Present Illness  Dehydration [E86.0]  Nausea & vomiting [R11.2]       Hospital Course  Patient is a 48 y.o. male presented with dehydration and concerns of malnutrition.  The patient was admitted and had a PICC line placed.  Vitamin labs were obtained and he was provided B1 and B12 injections.  Patient had PICC line placed and was observed.  He was started on TPN for nutritional supplementation.  During his hospital course it was determined through GI consultation and that his symptoms presumably may be related to gastroenteritis from a viral nature.  He was treated conservatively and eventually tolerated clears more consistently with less nausea.  His continued progression initiated his discharge home.  His electrolytes were stabilized and corrected.  He was discharged home in stable condition with recommendations of continued TPN and follow-up accordingly with our program.  As he continues to progress his TPN will be discontinued.      Procedures Performed         Consults:   Consults     Date and Time Order Name Status Description    4/26/2023 12:31 AM Inpatient Gastroenterology Consult Completed             Condition on Discharge:  Stable    Vital Signs  /90 (BP Location: Right arm, Patient Position: Sitting)   Pulse 74   Temp 98.5 °F (36.9 °C) (Oral)   Resp 16   Ht 182.9 cm (72\")   Wt (!) 151 kg (332 lb)   SpO2 100%   BMI 45.03 kg/m²     Physical Exam:  General Appearance alert, appears stated age and cooperative  Lungs clear to auscultation, respirations regular, respirations even and respirations unlabored  Heart regular rhythm & normal rate, normal S1, S2, no murmur, no gallop, no rub and no click  Abdomen normal bowel sounds, no masses, no hepatomegaly, no splenomegaly, soft non-tender, no guarding and no rebound tenderness    Discharge Disposition  Home or Self " Care    Discharge Medications     Discharge Medications      Continue These Medications      Instructions Start Date   buPROPion  MG 24 hr tablet  Commonly known as: WELLBUTRIN XL   150 mg, Oral, Every Morning      NON FORMULARY   1 each, Transdermal, Daily, Calcium patch (PatchAid)      NON FORMULARY   1 each, Transdermal, Daily, Vitamin B-12 patch (PatchAid)      NON FORMULARY   1 each, Transdermal, Daily, Multivitamin Patch (PatchAid)      pantoprazole 40 MG EC tablet  Commonly known as: PROTONIX   40 mg, Oral, Daily      promethazine 12.5 MG tablet  Commonly known as: PHENERGAN   12.5 mg, Oral, Every 8 Hours PRN         Stop These Medications    potassium chloride 20 MEQ CR tablet  Commonly known as: KLOR-CON            Discharge Diet:     Activity at Discharge:     Follow-up Appointments  Future Appointments   Date Time Provider Department Center   5/23/2023  9:30 AM Solitario Curran MD [facility] P[facility] None   8/22/2023  9:00 AM Francie Cabrera APRN [facility] P[facility] None   2/26/2024  9:30 AM Solitario Curran MD [facility] PAD None     Additional Instructions for the Follow-ups that You Need to Schedule     Ambulatory Referral to Home Health   As directed      Face to Face Visit Date: 5/1/2023    Follow-up provider for Plan of Care?: I will be treating the patient on an ongoing basis.  Please send me the Plan of Care for signature.    Follow-up provider: SOLITARIO CURRAN [[id number]]    Reason/Clinical Findings: Post sleeve complications    Describe mobility limitations that make leaving home difficult: PPN infusions    Nursing/Therapeutic Services Requested: Skilled Nursing    Skilled nursing orders: PICC line care/instruction Medication education Cardiopulmonary assessments Infusion therapy    Frequency: 1 Week 1               Test Results Pending at Discharge  Pending Labs     Order Current Status    Vitamin A & E In process           Solitario Curran MD  05/09/23  15:31 CDT

## 2023-05-10 ENCOUNTER — READMISSION MANAGEMENT (OUTPATIENT)
Dept: CALL CENTER | Facility: HOSPITAL | Age: 48
End: 2023-05-10
Payer: COMMERCIAL

## 2023-05-10 NOTE — OUTREACH NOTE
Medical Week 2 Survey    Flowsheet Row Responses   [facility] patient discharged from? [facility]   Does the patient have one of the following disease processes/diagnoses(primary or secondary)? Other   Week 2 attempt successful? Yes   Call start time 1538   General alerts for this patient TPN per [facility]    Call end time 1540   Meds reviewed with patient/caregiver? Yes   Is the patient taking all medications as directed (includes completed medication regime)? Yes   Does the patient have a primary care provider?  Yes   Has the patient kept scheduled appointments due by today? Yes   [facility] the [facility] agency[facility]    Has home health visited the patient within 72 hours of discharge? Yes   Psychosocial issues? No   Did the patient receive a copy of their discharge instructions? Yes   Nursing interventions Reviewed instructions with patient   What is the patient's perception of their health status since discharge? Improving   Is the patient/caregiver able to teach back signs and symptoms related to disease process for when to call PCP? Yes   Is the patient/caregiver able to teach back signs and symptoms related to disease process for when to call 911? Yes   Is the patient/caregiver able to teach back the hierarchy of who to call/visit for symptoms/problems? PCP, Specialist, Home health nurse, Urgent Care, ED, 911 Yes   If the patient is a current smoker, are they able to teach back resources for cessation? Not a smoker   Week 2 Call Completed? Yes          Christie HUANG - Licensed Nurse

## 2023-05-11 LAB
A-TOCOPHEROL VIT E SERPL-MCNC: 5.3 MG/L (ref 7–25.1)
GAMMA TOCOPHEROL SERPL-MCNC: 1.1 MG/L (ref 0.5–5.5)
VIT A SERPL-MCNC: 15 UG/DL (ref 20.1–62)

## 2023-05-17 ENCOUNTER — OFFICE VISIT (OUTPATIENT)
Dept: BARIATRICS/WEIGHT MGMT | Facility: CLINIC | Age: 48
End: 2023-05-17
Payer: COMMERCIAL

## 2023-05-17 ENCOUNTER — LAB (OUTPATIENT)
Dept: LAB | Facility: HOSPITAL | Age: 48
End: 2023-05-17
Payer: COMMERCIAL

## 2023-05-17 ENCOUNTER — HOSPITAL ENCOUNTER (OUTPATIENT)
Dept: GENERAL RADIOLOGY | Facility: HOSPITAL | Age: 48
Discharge: HOME | End: 2023-05-17
Payer: COMMERCIAL

## 2023-05-17 VITALS
SYSTOLIC BLOOD PRESSURE: 148 MMHG | DIASTOLIC BLOOD PRESSURE: 89 MMHG | OXYGEN SATURATION: 99 % | HEART RATE: 89 BPM | BODY MASS INDEX: 42.66 KG/M2 | HEIGHT: 72 IN | TEMPERATURE: 98.4 F | WEIGHT: 315 LBS

## 2023-05-17 DIAGNOSIS — Z98.84 STATUS POST LAPAROSCOPIC SLEEVE GASTRECTOMY: ICD-10-CM

## 2023-05-17 DIAGNOSIS — E44.0 MODERATE PROTEIN-CALORIE MALNUTRITION: ICD-10-CM

## 2023-05-17 DIAGNOSIS — Z45.2 PICC (PERIPHERALLY INSERTED CENTRAL CATHETER) REMOVAL: Primary | ICD-10-CM

## 2023-05-17 DIAGNOSIS — K52.9 GASTROENTERITIS: ICD-10-CM

## 2023-05-17 DIAGNOSIS — E88.81 METABOLIC SYNDROME: ICD-10-CM

## 2023-05-17 DIAGNOSIS — Z45.2 PICC (PERIPHERALLY INSERTED CENTRAL CATHETER) REMOVAL: ICD-10-CM

## 2023-05-17 DIAGNOSIS — R11.2 NAUSEA AND VOMITING, UNSPECIFIED VOMITING TYPE: ICD-10-CM

## 2023-05-17 LAB
ALBUMIN SERPL-MCNC: 4 G/DL (ref 3.5–5.2)
ALBUMIN/GLOB SERPL: 1.5 G/DL
ALP SERPL-CCNC: 77 U/L (ref 39–117)
ALT SERPL W P-5'-P-CCNC: 6 U/L (ref 1–41)
ANION GAP SERPL CALCULATED.3IONS-SCNC: 11 MMOL/L (ref 5–15)
AST SERPL-CCNC: 9 U/L (ref 1–40)
BILIRUB SERPL-MCNC: 0.5 MG/DL (ref 0–1.2)
BUN SERPL-MCNC: 11 MG/DL (ref 6–20)
BUN/CREAT SERPL: 12.1 (ref 7–25)
CALCIUM SPEC-SCNC: 9.1 MG/DL (ref 8.6–10.5)
CHLORIDE SERPL-SCNC: 104 MMOL/L (ref 98–107)
CO2 SERPL-SCNC: 26 MMOL/L (ref 22–29)
CREAT SERPL-MCNC: 0.91 MG/DL (ref 0.76–1.27)
DEPRECATED RDW RBC AUTO: 50.6 FL (ref 37–54)
EGFRCR SERPLBLD CKD-EPI 2021: 103.3 ML/MIN/1.73
ERYTHROCYTE [DISTWIDTH] IN BLOOD BY AUTOMATED COUNT: 15.5 % (ref 12.3–15.4)
GLOBULIN UR ELPH-MCNC: 2.6 GM/DL
GLUCOSE SERPL-MCNC: 91 MG/DL (ref 65–99)
HCT VFR BLD AUTO: 38.4 % (ref 37.5–51)
HGB BLD-MCNC: 12 G/DL (ref 13–17.7)
MCH RBC QN AUTO: 27.6 PG (ref 26.6–33)
MCHC RBC AUTO-ENTMCNC: 31.3 G/DL (ref 31.5–35.7)
MCV RBC AUTO: 88.3 FL (ref 79–97)
PLATELET # BLD AUTO: 252 10*3/MM3 (ref 140–450)
PMV BLD AUTO: 11.6 FL (ref 6–12)
POTASSIUM SERPL-SCNC: 3.9 MMOL/L (ref 3.5–5.2)
PROT SERPL-MCNC: 6.6 G/DL (ref 6–8.5)
RBC # BLD AUTO: 4.35 10*6/MM3 (ref 4.14–5.8)
SODIUM SERPL-SCNC: 141 MMOL/L (ref 136–145)
WBC NRBC COR # BLD: 9.03 10*3/MM3 (ref 3.4–10.8)

## 2023-05-17 PROCEDURE — 36415 COLL VENOUS BLD VENIPUNCTURE: CPT

## 2023-05-17 PROCEDURE — 85027 COMPLETE CBC AUTOMATED: CPT

## 2023-05-17 PROCEDURE — 84425 ASSAY OF VITAMIN B-1: CPT

## 2023-05-17 PROCEDURE — 84590 ASSAY OF VITAMIN A: CPT

## 2023-05-17 PROCEDURE — 83540 ASSAY OF IRON: CPT

## 2023-05-17 PROCEDURE — 80053 COMPREHEN METABOLIC PANEL: CPT

## 2023-05-17 PROCEDURE — 84446 ASSAY OF VITAMIN E: CPT

## 2023-05-17 PROCEDURE — 99024 POSTOP FOLLOW-UP VISIT: CPT | Performed by: SURGERY

## 2023-05-17 PROCEDURE — 84207 ASSAY OF VITAMIN B-6: CPT

## 2023-05-17 PROCEDURE — 71046 X-RAY EXAM CHEST 2 VIEWS: CPT

## 2023-05-17 PROCEDURE — 82607 VITAMIN B-12: CPT

## 2023-05-17 NOTE — PROGRESS NOTES
"  Patient Care Team:  Angelo Mckinnon APRN as PCP - General (Family Medicine)  Francie Cabrera APRN as Nurse Practitioner (Nurse Practitioner)    Subjective     Santiago Mcgraw is a 48 y.o. male.     Santiago is post op 3 months from his sleeve gastrectomy and is overcoming his gastroenteritis.  He is currently on his he is on a regular diet at this point and is measuring his meals to be portion sizes no greater than half a cup diet regimen.  He states he is tolerating this without difficulty.  He no longer requires antinausea/antiemetic medications he stopped the Zofran.  He is consuming at least 64 ounces of fluid and 60 g protein daily.  His energy has improved and his gout is also improved.  He no longer requires to use a cane due to his foot pain improvement.  He has been successful with the weaning off of the TPN as well.    Review Of Systems:  General ROS: positive for  - fatigue  Respiratory ROS: no cough, shortness of breath, or wheezing  Cardiovascular ROS: no chest pain or dyspnea on exertion  Gastrointestinal ROS: no abdominal pain, change in bowel habits, or black or bloody stools    The following portions of the patient's history were reviewed and updated as appropriate: allergies, current medications, past family history, past medical history, past social history, past surgical history, and problem list.    Objective   /89 (BP Location: Right arm, Patient Position: Sitting, Cuff Size: Adult)   Pulse 89   Temp 98.4 °F (36.9 °C)   Ht 182.9 cm (72\")   Wt (!) 154 kg (339 lb 6.4 oz)   SpO2 99%   BMI 46.03 kg/m²       05/17/23  1333   Weight: (!) 154 kg (339 lb 6.4 oz)        General Appearance:  awake, alert, oriented, in no acute distress  Lungs:  Normal expansion.  Clear to auscultation.  No rales, rhonchi, or wheezing.  Heart:  Heart regular rate and rhythm  Abdomen:  Soft, non-tender, normal bowel sounds; no bruits, organomegaly or masses.  Abnormal shape: obese  Wounds: clean, " dry, intact    ASSESSMENT/ PLAN    Encounter Diagnoses   Name Primary?    PICC (peripherally inserted central catheter) removal Yes    Metabolic syndrome     Gastroenteritis     Moderate protein-calorie malnutrition     Status post laparoscopic sleeve gastrectomy     Nausea and vomiting, unspecified vomiting type        Santiago Mcgraw and I discussed the importance of changing behavior for consistent and successful weight loss.  We first reviewed again the definition of a meal which is defined as portion sizes less than a half a cup and those portions incorporating a protein.  Specifically the protein should fill half of that volume.  I also explained that he should attempt to consume 4 meals as defined above daily and to avoid snacking or grazing.  He should also be mindful of adequate hydration by consuming at least 64 oz of water daily and incorporation of a regular exercise regimen.   I discussed the importance of taking his multivitamins as directed.  He is to return to our office 2 months or as needed.  I have also remove his PICC line under direct visualization.  He will require chest x-ray to verify removal of the central line as well.  Patient tolerated this without difficulty.  PICC line was placed in the biohazard bag.  Left arm which is dressed appropriately with Coban and 4 x 4 gauze.      05/17/23  15:03 CDT  Patient Care Team:  Angelo Mckinnon APRN as PCP - General (Family Medicine)  Francie Cabrera APRN as Nurse Practitioner (Nurse Practitioner)  Solitario Luo MD FACS

## 2023-05-18 ENCOUNTER — READMISSION MANAGEMENT (OUTPATIENT)
Dept: CALL CENTER | Facility: HOSPITAL | Age: 48
End: 2023-05-18
Payer: COMMERCIAL

## 2023-05-18 ENCOUNTER — TELEPHONE (OUTPATIENT)
Dept: BARIATRICS/WEIGHT MGMT | Facility: CLINIC | Age: 48
End: 2023-05-18
Payer: COMMERCIAL

## 2023-05-18 LAB
IRON 24H UR-MRATE: 41 MCG/DL (ref 59–158)
VIT B12 BLD-MCNC: 338 PG/ML (ref 211–946)

## 2023-05-18 RX ORDER — DOXYCYCLINE HYCLATE 50 MG/1
324 CAPSULE, GELATIN COATED ORAL
Qty: 30 TABLET | Refills: 0 | Status: SHIPPED | OUTPATIENT
Start: 2023-05-18

## 2023-05-18 NOTE — TELEPHONE ENCOUNTER
Left voice message with the patient to contact us with any additional questions.  The message's specify the prescription for iron replacement and to take it daily with a meal.  Use call back with any additional questions.

## 2023-05-18 NOTE — OUTREACH NOTE
Medical Week 3 Survey    Flowsheet Row Responses   [facility] patient discharged from? [place]   Does the patient have one of the following disease processes/diagnoses(primary or secondary)? Other   Week 3 attempt successful? Yes   Call start time 1527   Call end time 1528   General alerts for this patient TPN per [facility]    Discharge diagnosis Recurrent nausea and vomiting   Meds reviewed with patient/caregiver? Yes   Is the patient having any side effects they believe may be caused by any medication additions or changes? No   Is the patient taking all medications as directed (includes completed medication regime)? Yes   Does the patient have a primary care provider?  Yes   Does the patient have an appointment with their PCP within 7 days of discharge? Yes   Has the patient kept scheduled appointments due by today? Yes   [facility]    Has home health visited the patient within 72 hours of discharge? No  [pt feels he is discharged from , PICC line is dc'd]   Psychosocial issues? No   What is the patient's perception of their health status since discharge? Improving   Is the patient/caregiver able to teach back the hierarchy of who to call/visit for symptoms/problems? PCP, Specialist, Home health nurse, Urgent Care, ED, 911 Yes   Week 3 Call Completed? Yes   Graduated Yes   Is the patient interested in additional calls from an ambulatory ?  NOTE:  applies to high risk patients requiring additional follow-up. No          Aixa HUANG - Registered Nurse

## 2023-05-20 LAB
PYRIDOXAL PHOS SERPL-MCNC: 4.4 UG/L (ref 3.4–65.2)
VIT B1 BLD-SCNC: 150.7 NMOL/L (ref 66.5–200)

## 2023-05-21 LAB
A-TOCOPHEROL VIT E SERPL-MCNC: 7.5 MG/L (ref 7–25.1)
GAMMA TOCOPHEROL SERPL-MCNC: 0.6 MG/L (ref 0.5–5.5)
VIT A SERPL-MCNC: 15.5 UG/DL (ref 20.1–62)

## 2023-06-09 ENCOUNTER — OFFICE VISIT (OUTPATIENT)
Dept: FAMILY MEDICINE CLINIC | Age: 48
End: 2023-06-09
Payer: COMMERCIAL

## 2023-06-09 VITALS
WEIGHT: 315 LBS | DIASTOLIC BLOOD PRESSURE: 86 MMHG | HEIGHT: 72 IN | BODY MASS INDEX: 42.66 KG/M2 | HEART RATE: 90 BPM | TEMPERATURE: 97.8 F | OXYGEN SATURATION: 99 % | SYSTOLIC BLOOD PRESSURE: 136 MMHG

## 2023-06-09 DIAGNOSIS — Z00.00 ENCOUNTER FOR WELL ADULT EXAM WITHOUT ABNORMAL FINDINGS: Primary | ICD-10-CM

## 2023-06-09 DIAGNOSIS — E66.01 CLASS 3 SEVERE OBESITY DUE TO EXCESS CALORIES WITHOUT SERIOUS COMORBIDITY WITH BODY MASS INDEX (BMI) OF 40.0 TO 44.9 IN ADULT (HCC): ICD-10-CM

## 2023-06-09 DIAGNOSIS — H61.22 IMPACTED CERUMEN OF LEFT EAR: ICD-10-CM

## 2023-06-09 DIAGNOSIS — G47.33 OSA (OBSTRUCTIVE SLEEP APNEA): ICD-10-CM

## 2023-06-09 DIAGNOSIS — Z00.00 ROUTINE PHYSICAL EXAMINATION: ICD-10-CM

## 2023-06-09 DIAGNOSIS — Z12.11 COLON CANCER SCREENING: ICD-10-CM

## 2023-06-09 DIAGNOSIS — Z98.890 S/P GASTRIC SURGERY: ICD-10-CM

## 2023-06-09 DIAGNOSIS — E55.9 VITAMIN D DEFICIENCY: ICD-10-CM

## 2023-06-09 PROCEDURE — 99396 PREV VISIT EST AGE 40-64: CPT | Performed by: NURSE PRACTITIONER

## 2023-06-09 PROCEDURE — 69209 REMOVE IMPACTED EAR WAX UNI: CPT | Performed by: NURSE PRACTITIONER

## 2023-06-09 PROCEDURE — 3075F SYST BP GE 130 - 139MM HG: CPT | Performed by: NURSE PRACTITIONER

## 2023-06-09 PROCEDURE — 3079F DIAST BP 80-89 MM HG: CPT | Performed by: NURSE PRACTITIONER

## 2023-06-09 RX ORDER — DOXYCYCLINE HYCLATE 50 MG/1
1 CAPSULE, GELATIN COATED ORAL
COMMUNITY
Start: 2023-05-18

## 2023-06-09 RX ORDER — ERGOCALCIFEROL 1.25 MG/1
50000 CAPSULE ORAL WEEKLY
Qty: 4 CAPSULE | Refills: 2 | Status: SHIPPED | OUTPATIENT
Start: 2023-06-09

## 2023-06-09 ASSESSMENT — PATIENT HEALTH QUESTIONNAIRE - PHQ9
SUM OF ALL RESPONSES TO PHQ QUESTIONS 1-9: 0
SUM OF ALL RESPONSES TO PHQ QUESTIONS 1-9: 0
SUM OF ALL RESPONSES TO PHQ9 QUESTIONS 1 & 2: 0
5. POOR APPETITE OR OVEREATING: 0
9. THOUGHTS THAT YOU WOULD BE BETTER OFF DEAD, OR OF HURTING YOURSELF: 0
8. MOVING OR SPEAKING SO SLOWLY THAT OTHER PEOPLE COULD HAVE NOTICED. OR THE OPPOSITE, BEING SO FIGETY OR RESTLESS THAT YOU HAVE BEEN MOVING AROUND A LOT MORE THAN USUAL: 0
1. LITTLE INTEREST OR PLEASURE IN DOING THINGS: 0
SUM OF ALL RESPONSES TO PHQ QUESTIONS 1-9: 0
7. TROUBLE CONCENTRATING ON THINGS, SUCH AS READING THE NEWSPAPER OR WATCHING TELEVISION: 0
4. FEELING TIRED OR HAVING LITTLE ENERGY: 0
10. IF YOU CHECKED OFF ANY PROBLEMS, HOW DIFFICULT HAVE THESE PROBLEMS MADE IT FOR YOU TO DO YOUR WORK, TAKE CARE OF THINGS AT HOME, OR GET ALONG WITH OTHER PEOPLE: 0
2. FEELING DOWN, DEPRESSED OR HOPELESS: 0
3. TROUBLE FALLING OR STAYING ASLEEP: 0
SUM OF ALL RESPONSES TO PHQ QUESTIONS 1-9: 0
6. FEELING BAD ABOUT YOURSELF - OR THAT YOU ARE A FAILURE OR HAVE LET YOURSELF OR YOUR FAMILY DOWN: 0

## 2023-06-09 ASSESSMENT — ENCOUNTER SYMPTOMS
VOMITING: 0
TROUBLE SWALLOWING: 0
RHINORRHEA: 0
SORE THROAT: 0
COUGH: 0
NAUSEA: 0
ABDOMINAL PAIN: 0
SHORTNESS OF BREATH: 0
CONSTIPATION: 0
DIARRHEA: 0

## 2023-06-09 NOTE — PROGRESS NOTES
Addended by: JOSE BECKETT on: 2/15/2023 12:29 PM     Modules accepted: Orders     Effort: Pulmonary effort is normal.      Breath sounds: Normal breath sounds. Abdominal:      General: Bowel sounds are normal. There is no distension. Palpations: Abdomen is soft. Tenderness: There is no abdominal tenderness. Genitourinary:     Comments: deferred  Musculoskeletal:         General: Normal range of motion. Cervical back: Full passive range of motion without pain and normal range of motion. Right lower leg: No edema. Left lower leg: No edema. Lymphadenopathy:      Cervical: No cervical adenopathy. Skin:     General: Skin is warm and dry. Capillary Refill: Capillary refill takes less than 2 seconds. Neurological:      Mental Status: He is alert and oriented to person, place, and time. GCS: GCS eye subscore is 4. GCS verbal subscore is 5. GCS motor subscore is 6. Cranial Nerves: No cranial nerve deficit. Sensory: No sensory deficit. Motor: Motor function is intact. Coordination: Coordination is intact. Coordination normal.      Gait: Gait normal.      Deep Tendon Reflexes: Reflexes are normal and symmetric. Psychiatric:         Attention and Perception: Attention normal.         Mood and Affect: Mood and affect normal.         Speech: Speech normal.         Behavior: Behavior normal. Behavior is cooperative. Thought Content: Thought content normal.         Cognition and Memory: Cognition normal.         Judgment: Judgment normal.       No flowsheet data found.     Lab Results   Component Value Date/Time    CHOL 129 08/24/2018 08:14 AM    CHOL 126 09/03/2015 12:00 AM    TRIG 134 08/24/2018 08:14 AM    TRIG 123 09/03/2015 12:00 AM    HDL 36 08/24/2018 08:14 AM    HDL 34 09/03/2015 12:00 AM    LDLCALC 66 08/24/2018 08:14 AM    LDLCALC 67 09/03/2015 12:00 AM    GLUCOSE 127 08/09/2021 04:30 PM       The ASCVD Risk score (Marie DK, et al., 2019) failed to calculate for the following reasons:    Cannot find a previous HDL lab    Cannot

## 2023-06-09 NOTE — PATIENT INSTRUCTIONS
no alcohol is the best choice. Exercise. Get at least 30 minutes of exercise on most days of the week. Walking can be a good choice. Reach and stay at your healthy weight. This will lower your risk for many health problems. Take care of your mental health. Try to stay connected with friends, family, and community, and find ways to manage stress. If you're feeling depressed or hopeless, talk to someone. A counselor can help. If you don't have a counselor, talk to your doctor. Talk to your doctor if you think you may have a problem with alcohol or drug use. This includes prescription medicines and illegal drugs. Avoid tobacco and nicotine: Don't smoke, vape, or chew. If you need help quitting, talk to your doctor. Practice safer sex. Getting tested, using condoms or dental dams, and limiting sex partners can help prevent STIs. Use birth control if it's important to you to prevent pregnancy. Talk with your doctor about your choices and what might be best for you. Prevent problems where you can. Protect your skin from too much sun, wash your hands, brush your teeth twice a day, and wear a seat belt in the car. Where can you learn more? Go to http://www.mcdonough.com/ and enter P072 to learn more about \"Well Visit, Ages 25 to 72: Care Instructions. \"  Current as of: November 14, 2022               Content Version: 13.6  © 7320-3181 [employer], Incorporated. Care instructions adapted under license by [facility] ([facility]). If you have questions about a medical condition or this instruction, always ask your healthcare professional. [place] [address] any warranty or liability for your use of this information. A Healthy Lifestyle: Care Instructions  A healthy lifestyle can help you feel good, have more energy, and stay at a weight that's healthy for you. You can share a healthy lifestyle with your friends and family. And you can do it on your own.     Eat meals with

## 2023-06-24 ENCOUNTER — PATIENT MESSAGE (OUTPATIENT)
Dept: FAMILY MEDICINE CLINIC | Age: 48
End: 2023-06-24

## 2023-06-26 RX ORDER — AZITHROMYCIN 250 MG/1
TABLET, FILM COATED ORAL
Qty: 6 TABLET | Refills: 0 | Status: SHIPPED | OUTPATIENT
Start: 2023-06-26 | End: 2023-07-01

## 2023-07-26 ENCOUNTER — TELEPHONE (OUTPATIENT)
Dept: FAMILY MEDICINE CLINIC | Age: 48
End: 2023-07-26

## 2023-07-26 NOTE — TELEPHONE ENCOUNTER
Received a message from aleisha at the sleep lab. In house study has been denied. [facility] is requesting a home study. Is this ok to change?

## 2023-07-27 DIAGNOSIS — G47.33 OSA (OBSTRUCTIVE SLEEP APNEA): Primary | ICD-10-CM

## 2023-08-04 ENCOUNTER — HOSPITAL ENCOUNTER (OUTPATIENT)
Dept: SLEEP CENTER | Age: 48
Discharge: HOME | End: 2023-08-06
Payer: COMMERCIAL

## 2023-08-04 DIAGNOSIS — E55.9 VITAMIN D DEFICIENCY: ICD-10-CM

## 2023-08-04 DIAGNOSIS — G47.33 OSA (OBSTRUCTIVE SLEEP APNEA): ICD-10-CM

## 2023-08-04 PROCEDURE — G0399 HOME SLEEP TEST/TYPE 3 PORTA: HCPCS

## 2023-08-04 RX ORDER — ERGOCALCIFEROL 1.25 MG/1
CAPSULE ORAL
Qty: 4 CAPSULE | Refills: 2 | Status: SHIPPED | OUTPATIENT
Start: 2023-08-04

## 2023-08-04 NOTE — TELEPHONE ENCOUNTER
Received fax from pharmacy requesting refill on pts medication(s). Pt was last seen in office on 6/9/2023  and has a follow up scheduled for 9/11/2023. Will send request to  Dr. Atlas Dakins  for authorization.      Requested Prescriptions     Pending Prescriptions Disp Refills    vitamin D (ERGOCALCIFEROL) 1.25 MG (48126 UT) CAPS capsule [Pharmacy Med Name: ergocalciferol (vitamin D2) 1,250 mcg (50,000 unit) capsule] 4 capsule 2     Sig: TAKE ONE CAPSULE BY MOUTH ONCE A WEEK

## 2023-08-04 NOTE — PROGRESS NOTES
Mr. Damián Shanks presented today in the sleep center for a Home Sleep Test (HST). Mr. Damián Shanks was instructed on the device and was requested to wear the unit for two nights. Mr. Damián Shanks was asked to have the HST monitor back by 10AM on 08/07/2023. The patient acknowledged they understood.

## 2023-08-06 PROCEDURE — G0399 HOME SLEEP TEST/TYPE 3 PORTA: HCPCS

## 2023-08-13 DIAGNOSIS — G47.33 OSA (OBSTRUCTIVE SLEEP APNEA): Primary | ICD-10-CM

## 2023-08-22 ENCOUNTER — OFFICE VISIT (OUTPATIENT)
Dept: BARIATRICS/WEIGHT MGMT | Facility: CLINIC | Age: 48
End: 2023-08-22
Payer: COMMERCIAL

## 2023-08-22 VITALS
HEART RATE: 59 BPM | TEMPERATURE: 98.7 F | SYSTOLIC BLOOD PRESSURE: 140 MMHG | WEIGHT: 315 LBS | BODY MASS INDEX: 42.66 KG/M2 | HEIGHT: 72 IN | DIASTOLIC BLOOD PRESSURE: 81 MMHG | OXYGEN SATURATION: 98 %

## 2023-08-22 DIAGNOSIS — I10 PRIMARY HYPERTENSION: ICD-10-CM

## 2023-08-22 DIAGNOSIS — E66.01 CLASS 3 SEVERE OBESITY DUE TO EXCESS CALORIES WITH SERIOUS COMORBIDITY AND BODY MASS INDEX (BMI) OF 40.0 TO 44.9 IN ADULT: ICD-10-CM

## 2023-08-22 DIAGNOSIS — Z98.84 STATUS POST LAPAROSCOPIC SLEEVE GASTRECTOMY: Primary | ICD-10-CM

## 2023-08-22 DIAGNOSIS — E55.9 VITAMIN D DEFICIENCY: ICD-10-CM

## 2023-08-22 RX ORDER — DIPHENOXYLATE HYDROCHLORIDE AND ATROPINE SULFATE 2.5; .025 MG/1; MG/1
TABLET ORAL DAILY
COMMUNITY

## 2023-08-22 RX ORDER — ERGOCALCIFEROL 1.25 MG/1
50000 CAPSULE ORAL WEEKLY
COMMUNITY

## 2023-08-22 NOTE — PROGRESS NOTES
"Patient Care Team:  Angelo Mckinnon APRN as PCP - General (Family Medicine)  Francie Cabrera APRN as Nurse Practitioner (Nurse Practitioner)    Chief Complaint: S/P Sleeve Gastrectomy    Subjective     Santiago Mcgraw is POD 6 months from a sleeve gastrectomy. Patient admits to eating around 4 meals per day and denies to grazing in between meals. He admits to drinking at least 64 ounces or more of fluid each day and  70-80 grams of protein. He is currently exercising: 3-4 times per week. He states that he has gone without soda intake and denies  nicotine use. Patient has lost 18  pound since his last appointment with us.     Patient denies  struggling with drinking while he is eating. He also denies to eating quickly instead of taking 30 minutes to eat his meals.       Review of Systems:     Review of Systems   Constitutional: Negative.    Respiratory: Negative.     Cardiovascular: Negative.    Gastrointestinal: Negative.    Endocrine: Negative.    Musculoskeletal: Negative.    Psychiatric/Behavioral: Negative.        Objective     Vital Signs  /81 (BP Location: Right arm, Patient Position: Sitting, Cuff Size: Adult)   Pulse 59   Temp 98.7 øF (37.1 øC)   Ht 182.9 cm (72\")   Wt (!) 146 kg (321 lb 9.6 oz)   SpO2 98%   BMI 43.62 kg/mý          Physical Exam:    Physical Exam  Vitals reviewed.   Constitutional:       Appearance: He is obese.   Cardiovascular:      Rate and Rhythm: Normal rate and regular rhythm.   Pulmonary:      Effort: Pulmonary effort is normal.   Abdominal:      General: Bowel sounds are normal.      Palpations: Abdomen is soft.   Musculoskeletal:         General: Normal range of motion.   Skin:     General: Skin is warm and dry.   Neurological:      Mental Status: He is alert and oriented to person, place, and time.   Psychiatric:         Mood and Affect: Mood normal.         Behavior: Behavior normal.         Results Review:    Labs reviewed    Medication Reviewed: "   Current Outpatient Medications   Medication Sig Dispense Refill    CALCIUM-VITAMIN D PO Take  by mouth.      Cyanocobalamin (VITAMIN B-12 PO) Take  by mouth.      multivitamin (MULTIVITAMIN PO) Take  by mouth Daily.      vitamin D (ERGOCALCIFEROL) 1.25 MG (92024 UT) capsule capsule Take 1 capsule by mouth 1 (One) Time Per Week.      buPROPion XL (WELLBUTRIN XL) 150 MG 24 hr tablet Take 1 tablet by mouth Every Morning. (Patient not taking: Reported on 8/22/2023)      ferrous gluconate (FERGON) 324 MG tablet Take 1 tablet by mouth Daily With Breakfast. (Patient not taking: Reported on 8/22/2023) 30 tablet 0     Current Facility-Administered Medications   Medication Dose Route Frequency Provider Last Rate Last Admin    thiamine (B-1) injection 100 mg  100 mg Intramuscular Daily Solitario Luo MD           Assessment & Plan    POD  6 months  from Sleeve Gastrectomy.  Diagnoses and all orders for this visit:    1. Status post laparoscopic sleeve gastrectomy (Primary)  Comments:  Advised to add protein shake in addition to 4 meals.    2. Class 3 severe obesity due to excess calories with serious comorbidity and body mass index (BMI) of 40.0 to 44.9 in adult  Assessment & Plan:  Patient's (Body mass index is 43.62 kg/mý.) indicates that they are morbidly/severely obese (BMI > 40 or > 35 with obesity - related health condition) with health conditions that include hypertension . Weight is improving.  BMI  is above average; BMI management plan is completed. We discussed portion control and increasing exercise.       3. Primary hypertension       Santiago Mcgraw and I discussed the importance of changing behavior for consistent and successful weight loss. We first reviewed again the definition of a meal which is defined as portion sizes less than a half a cup and those portions incorporating a protein. Specifically the protein should fill half of that volume. I also explained that he should attempt to consume 4 meals as  defined above daily and to avoid snacking or grazing. He should also be mindful of adequate hydration by consuming at least 64 oz of water daily and incorporation of a regular exercise regimen.     I discussed the importance of taking his multivitamins as directed.    He is to return to our office 6 months or as needed.        HILDA Posada  08/22/23  09:48 CDT

## 2023-08-22 NOTE — ASSESSMENT & PLAN NOTE
Patient's (Body mass index is 43.62 kg/mý.) indicates that they are morbidly/severely obese (BMI > 40 or > 35 with obesity - related health condition) with health conditions that include hypertension . Weight is improving.  BMI  is above average; BMI management plan is completed. We discussed portion control and increasing exercise.

## 2023-09-06 ENCOUNTER — TELEPHONE (OUTPATIENT)
Dept: BARIATRICS/WEIGHT MGMT | Facility: CLINIC | Age: 48
End: 2023-09-06
Payer: COMMERCIAL

## 2023-09-26 ENCOUNTER — LAB (OUTPATIENT)
Dept: LAB | Facility: HOSPITAL | Age: 48
End: 2023-09-26
Payer: COMMERCIAL

## 2023-09-26 DIAGNOSIS — Z98.84 STATUS POST LAPAROSCOPIC SLEEVE GASTRECTOMY: ICD-10-CM

## 2023-09-26 DIAGNOSIS — E55.9 VITAMIN D DEFICIENCY: ICD-10-CM

## 2023-09-26 DIAGNOSIS — E66.01 CLASS 3 SEVERE OBESITY DUE TO EXCESS CALORIES WITH SERIOUS COMORBIDITY AND BODY MASS INDEX (BMI) OF 40.0 TO 44.9 IN ADULT: ICD-10-CM

## 2023-09-26 LAB
25(OH)D3 SERPL-MCNC: 30.6 NG/ML (ref 30–100)
ALBUMIN SERPL-MCNC: 4.1 G/DL (ref 3.5–5.2)
ALBUMIN/GLOB SERPL: 1.7 G/DL
ALP SERPL-CCNC: 69 U/L (ref 39–117)
ALT SERPL W P-5'-P-CCNC: <5 U/L (ref 1–41)
ANION GAP SERPL CALCULATED.3IONS-SCNC: 8 MMOL/L (ref 5–15)
AST SERPL-CCNC: 7 U/L (ref 1–40)
BASOPHILS # BLD AUTO: 0.05 10*3/MM3 (ref 0–0.2)
BASOPHILS NFR BLD AUTO: 0.8 % (ref 0–1.5)
BILIRUB SERPL-MCNC: 0.6 MG/DL (ref 0–1.2)
BUN SERPL-MCNC: 18 MG/DL (ref 6–20)
BUN/CREAT SERPL: 19.8 (ref 7–25)
CALCIUM SPEC-SCNC: 8.7 MG/DL (ref 8.6–10.5)
CHLORIDE SERPL-SCNC: 104 MMOL/L (ref 98–107)
CO2 SERPL-SCNC: 29 MMOL/L (ref 22–29)
CREAT SERPL-MCNC: 0.91 MG/DL (ref 0.76–1.27)
DEPRECATED RDW RBC AUTO: 43.4 FL (ref 37–54)
EGFRCR SERPLBLD CKD-EPI 2021: 103.3 ML/MIN/1.73
EOSINOPHIL # BLD AUTO: 0.3 10*3/MM3 (ref 0–0.4)
EOSINOPHIL NFR BLD AUTO: 4.8 % (ref 0.3–6.2)
ERYTHROCYTE [DISTWIDTH] IN BLOOD BY AUTOMATED COUNT: 13.8 % (ref 12.3–15.4)
FOLATE SERPL-MCNC: 10.9 NG/ML (ref 4.78–24.2)
GLOBULIN UR ELPH-MCNC: 2.4 GM/DL
GLUCOSE SERPL-MCNC: 98 MG/DL (ref 65–99)
HCT VFR BLD AUTO: 43.6 % (ref 37.5–51)
HGB BLD-MCNC: 13.7 G/DL (ref 13–17.7)
IMM GRANULOCYTES # BLD AUTO: 0.02 10*3/MM3 (ref 0–0.05)
IMM GRANULOCYTES NFR BLD AUTO: 0.3 % (ref 0–0.5)
IRON 24H UR-MRATE: 85 MCG/DL (ref 59–158)
LYMPHOCYTES # BLD AUTO: 1.53 10*3/MM3 (ref 0.7–3.1)
LYMPHOCYTES NFR BLD AUTO: 24.6 % (ref 19.6–45.3)
MCH RBC QN AUTO: 27.2 PG (ref 26.6–33)
MCHC RBC AUTO-ENTMCNC: 31.4 G/DL (ref 31.5–35.7)
MCV RBC AUTO: 86.5 FL (ref 79–97)
MONOCYTES # BLD AUTO: 0.45 10*3/MM3 (ref 0.1–0.9)
MONOCYTES NFR BLD AUTO: 7.2 % (ref 5–12)
NEUTROPHILS NFR BLD AUTO: 3.88 10*3/MM3 (ref 1.7–7)
NEUTROPHILS NFR BLD AUTO: 62.3 % (ref 42.7–76)
NRBC BLD AUTO-RTO: 0 /100 WBC (ref 0–0.2)
PLATELET # BLD AUTO: 192 10*3/MM3 (ref 140–450)
PMV BLD AUTO: 11.5 FL (ref 6–12)
POTASSIUM SERPL-SCNC: 4 MMOL/L (ref 3.5–5.2)
PROT SERPL-MCNC: 6.5 G/DL (ref 6–8.5)
RBC # BLD AUTO: 5.04 10*6/MM3 (ref 4.14–5.8)
SODIUM SERPL-SCNC: 141 MMOL/L (ref 136–145)
VIT B12 BLD-MCNC: <150 PG/ML (ref 211–946)
WBC NRBC COR # BLD: 6.23 10*3/MM3 (ref 3.4–10.8)

## 2023-09-26 PROCEDURE — 80053 COMPREHEN METABOLIC PANEL: CPT

## 2023-09-26 PROCEDURE — 85025 COMPLETE CBC W/AUTO DIFF WBC: CPT

## 2023-09-26 PROCEDURE — 82746 ASSAY OF FOLIC ACID SERUM: CPT

## 2023-09-26 PROCEDURE — 84590 ASSAY OF VITAMIN A: CPT

## 2023-09-26 PROCEDURE — 84446 ASSAY OF VITAMIN E: CPT

## 2023-09-26 PROCEDURE — 84425 ASSAY OF VITAMIN B-1: CPT

## 2023-09-26 PROCEDURE — 84630 ASSAY OF ZINC: CPT

## 2023-09-26 PROCEDURE — 36415 COLL VENOUS BLD VENIPUNCTURE: CPT

## 2023-09-26 PROCEDURE — 82607 VITAMIN B-12: CPT

## 2023-09-26 PROCEDURE — 82306 VITAMIN D 25 HYDROXY: CPT

## 2023-09-26 PROCEDURE — 82525 ASSAY OF COPPER: CPT

## 2023-09-26 PROCEDURE — 84207 ASSAY OF VITAMIN B-6: CPT

## 2023-09-26 PROCEDURE — 83540 ASSAY OF IRON: CPT

## 2023-09-29 ENCOUNTER — OFFICE VISIT (OUTPATIENT)
Dept: FAMILY MEDICINE CLINIC | Age: 48
End: 2023-09-29
Payer: COMMERCIAL

## 2023-09-29 VITALS
SYSTOLIC BLOOD PRESSURE: 128 MMHG | OXYGEN SATURATION: 99 % | HEART RATE: 68 BPM | WEIGHT: 315 LBS | BODY MASS INDEX: 43.54 KG/M2 | TEMPERATURE: 97.4 F | DIASTOLIC BLOOD PRESSURE: 82 MMHG

## 2023-09-29 DIAGNOSIS — M17.0 PRIMARY OSTEOARTHRITIS OF BOTH KNEES: ICD-10-CM

## 2023-09-29 DIAGNOSIS — E55.9 VITAMIN D DEFICIENCY: ICD-10-CM

## 2023-09-29 DIAGNOSIS — Z23 NEED FOR INFLUENZA VACCINATION: ICD-10-CM

## 2023-09-29 DIAGNOSIS — G47.33 OSA (OBSTRUCTIVE SLEEP APNEA): ICD-10-CM

## 2023-09-29 DIAGNOSIS — D50.9 IRON DEFICIENCY ANEMIA, UNSPECIFIED IRON DEFICIENCY ANEMIA TYPE: ICD-10-CM

## 2023-09-29 DIAGNOSIS — E66.01 CLASS 3 SEVERE OBESITY DUE TO EXCESS CALORIES WITHOUT SERIOUS COMORBIDITY WITH BODY MASS INDEX (BMI) OF 40.0 TO 44.9 IN ADULT (HCC): Primary | ICD-10-CM

## 2023-09-29 DIAGNOSIS — Z98.890 S/P GASTRIC SURGERY: ICD-10-CM

## 2023-09-29 DIAGNOSIS — E53.8 B12 DEFICIENCY: ICD-10-CM

## 2023-09-29 LAB
A-TOCOPHEROL VIT E SERPL-MCNC: 8.8 MG/L (ref 7–25.1)
GAMMA TOCOPHEROL SERPL-MCNC: 0.7 MG/L (ref 0.5–5.5)
VIT A SERPL-MCNC: 27.8 UG/DL (ref 20.1–62)
VIT B1 BLD-SCNC: 169.5 NMOL/L (ref 66.5–200)

## 2023-09-29 PROCEDURE — 90674 CCIIV4 VAC NO PRSV 0.5 ML IM: CPT | Performed by: NURSE PRACTITIONER

## 2023-09-29 PROCEDURE — 3079F DIAST BP 80-89 MM HG: CPT | Performed by: NURSE PRACTITIONER

## 2023-09-29 PROCEDURE — 3074F SYST BP LT 130 MM HG: CPT | Performed by: NURSE PRACTITIONER

## 2023-09-29 PROCEDURE — 90471 IMMUNIZATION ADMIN: CPT | Performed by: NURSE PRACTITIONER

## 2023-09-29 PROCEDURE — 99214 OFFICE O/P EST MOD 30 MIN: CPT | Performed by: NURSE PRACTITIONER

## 2023-09-29 PROCEDURE — 96372 THER/PROPH/DIAG INJ SC/IM: CPT | Performed by: NURSE PRACTITIONER

## 2023-09-29 RX ORDER — ACETAMINOPHEN 160 MG
TABLET,DISINTEGRATING ORAL
COMMUNITY

## 2023-09-29 RX ORDER — ASCORBIC ACID 500 MG
500 TABLET ORAL DAILY
COMMUNITY

## 2023-09-29 RX ORDER — HYDROCODONE BITARTRATE AND ACETAMINOPHEN 7.5; 325 MG/1; MG/1
1 TABLET ORAL EVERY 6 HOURS PRN
Qty: 30 TABLET | Refills: 0 | Status: SHIPPED | OUTPATIENT
Start: 2023-09-29 | End: 2023-10-07

## 2023-09-29 RX ORDER — CYANOCOBALAMIN 1000 UG/ML
1000 INJECTION, SOLUTION INTRAMUSCULAR; SUBCUTANEOUS ONCE
Status: COMPLETED | OUTPATIENT
Start: 2023-09-29 | End: 2023-09-29

## 2023-09-29 RX ADMIN — CYANOCOBALAMIN 1000 MCG: 1000 INJECTION, SOLUTION INTRAMUSCULAR; SUBCUTANEOUS at 09:09

## 2023-09-29 ASSESSMENT — ENCOUNTER SYMPTOMS
TROUBLE SWALLOWING: 0
SORE THROAT: 0
CONSTIPATION: 0
SHORTNESS OF BREATH: 0
VOMITING: 0
RHINORRHEA: 0
NAUSEA: 0
ABDOMINAL PAIN: 0
DIARRHEA: 0
COUGH: 0

## 2023-09-29 ASSESSMENT — PATIENT HEALTH QUESTIONNAIRE - PHQ9
SUM OF ALL RESPONSES TO PHQ QUESTIONS 1-9: 0
SUM OF ALL RESPONSES TO PHQ QUESTIONS 1-9: 0
4. FEELING TIRED OR HAVING LITTLE ENERGY: 0
5. POOR APPETITE OR OVEREATING: 0
2. FEELING DOWN, DEPRESSED OR HOPELESS: 0
1. LITTLE INTEREST OR PLEASURE IN DOING THINGS: 0
10. IF YOU CHECKED OFF ANY PROBLEMS, HOW DIFFICULT HAVE THESE PROBLEMS MADE IT FOR YOU TO DO YOUR WORK, TAKE CARE OF THINGS AT HOME, OR GET ALONG WITH OTHER PEOPLE: 0
SUM OF ALL RESPONSES TO PHQ9 QUESTIONS 1 & 2: 0
7. TROUBLE CONCENTRATING ON THINGS, SUCH AS READING THE NEWSPAPER OR WATCHING TELEVISION: 0
6. FEELING BAD ABOUT YOURSELF - OR THAT YOU ARE A FAILURE OR HAVE LET YOURSELF OR YOUR FAMILY DOWN: 0
SUM OF ALL RESPONSES TO PHQ QUESTIONS 1-9: 0
SUM OF ALL RESPONSES TO PHQ QUESTIONS 1-9: 0
3. TROUBLE FALLING OR STAYING ASLEEP: 0
8. MOVING OR SPEAKING SO SLOWLY THAT OTHER PEOPLE COULD HAVE NOTICED. OR THE OPPOSITE, BEING SO FIGETY OR RESTLESS THAT YOU HAVE BEEN MOVING AROUND A LOT MORE THAN USUAL: 0
9. THOUGHTS THAT YOU WOULD BE BETTER OFF DEAD, OR OF HURTING YOURSELF: 0

## 2023-09-29 NOTE — PROGRESS NOTES
Vaccine Information Sheet, \"Influenza - Inactivated\"  given to Aiyana Small, or parent/legal guardian of  Aiyana Small and verbalized understanding. Patient responses:    Have you ever had a reaction to a flu vaccine? NO  Do you have an adverse effect when eating eggs? NO  Do you have any current illness? NO  Have you ever had [place] Syndrome? NO    Flu vaccine given per order. Please see immunization tab. Patient tolerated well. Patient is to call the office with any complications from vaccine. Given in left deltoid. See media for consent form. After obtaining consent and per order of jim GAYTAN, gave patient Vitamin B12 1000mcg injection in Right deltoid, patient tolerated well. Medication was not supplied by the patient.

## 2023-10-04 LAB
COPPER SERPL-MCNC: 85 UG/DL (ref 69–132)
PYRIDOXAL PHOS SERPL-MCNC: 3.9 UG/L (ref 3.4–65.2)
ZINC SERPL-MCNC: 66 UG/DL (ref 44–115)

## 2024-02-12 NOTE — CASE MANAGEMENT/SOCIAL WORK
"Discharge Planning Assessment  [facility]     Patient Name: Santiago Mcgraw  MRN: [id number]  Today's Date: 4/27/2023    Admit Date: 4/25/2023        Discharge Needs Assessment     Row Name 04/27/23 1002       Living Environment    People in Home spouse;child(hardik), dependent    Name(s) of People in Home Radha Mcgraw - spouse    Current Living Arrangements home    Potentially Unsafe Housing Conditions none    Primary Care Provided by self    Provides Primary Care For no one    Family Caregiver if Needed spouse    Quality of Family Relationships helpful;involved;supportive    Able to Return to Prior Arrangements yes       Resource/Environmental Concerns    Resource/Environmental Concerns none    Transportation Concerns none       Food Insecurity    Within the past 12 months, you worried that your food would run out before you got the money to buy more. Never true    Within the past 12 months, the food you bought just didn't last and you didn't have money to get more. Never true       Transition Planning    Patient/Family Anticipates Transition to home with family    Patient/Family Anticipated Services at Transition none    Transportation Anticipated family or friend will provide       Discharge Needs Assessment    Readmission Within the Last 30 Days no previous admission in last 30 days    Equipment Currently Used at Home cpap;crutches  uses crutches when gout flares up    Concerns to be Addressed discharge planning;denies needs/concerns at this time    Anticipated Changes Related to Illness none    Discharge Coordination/Progress Pt lives at home with spouse and dependent children, independent and drives self; states he has PCP and RX coverage and anticipates going home at DC \"from where ever that may be or when\"; states no concerns for DC at this time; will continue to follow.               Discharge Plan    No documentation.               Continued Care and Services - Admitted Since 4/25/2023    Coordination has " not been started for this encounter.          Demographic Summary    No documentation.                Functional Status    No documentation.                Psychosocial    No documentation.                Abuse/Neglect    No documentation.                Legal    No documentation.                Substance Abuse    No documentation.                Patient Forms    No documentation.                   Sofiya Pritchett RN     Yes

## 2024-07-08 ENCOUNTER — OFFICE VISIT (OUTPATIENT)
Dept: PRIMARY CARE CLINIC | Age: 49
End: 2024-07-08
Payer: COMMERCIAL

## 2024-07-08 ENCOUNTER — HOSPITAL ENCOUNTER (EMERGENCY)
Facility: HOSPITAL | Age: 49
Discharge: HOME | End: 2024-07-08
Payer: COMMERCIAL

## 2024-07-08 ENCOUNTER — APPOINTMENT (OUTPATIENT)
Dept: ULTRASOUND IMAGING | Facility: HOSPITAL | Age: 49
End: 2024-07-08
Payer: COMMERCIAL

## 2024-07-08 VITALS
WEIGHT: 315 LBS | HEART RATE: 55 BPM | HEIGHT: 72 IN | OXYGEN SATURATION: 99 % | BODY MASS INDEX: 42.66 KG/M2 | TEMPERATURE: 97.2 F | DIASTOLIC BLOOD PRESSURE: 74 MMHG | SYSTOLIC BLOOD PRESSURE: 130 MMHG

## 2024-07-08 VITALS
HEIGHT: 74 IN | TEMPERATURE: 99 F | OXYGEN SATURATION: 100 % | BODY MASS INDEX: 40.43 KG/M2 | SYSTOLIC BLOOD PRESSURE: 139 MMHG | WEIGHT: 315 LBS | RESPIRATION RATE: 20 BRPM | HEART RATE: 62 BPM | DIASTOLIC BLOOD PRESSURE: 81 MMHG

## 2024-07-08 DIAGNOSIS — L03.115 CELLULITIS OF RIGHT LOWER EXTREMITY: Primary | ICD-10-CM

## 2024-07-08 DIAGNOSIS — I83.811 VARICOSE VEINS OF RIGHT LOWER EXTREMITY WITH PAIN: Primary | ICD-10-CM

## 2024-07-08 PROCEDURE — 3075F SYST BP GE 130 - 139MM HG: CPT | Performed by: NURSE PRACTITIONER

## 2024-07-08 PROCEDURE — 3078F DIAST BP <80 MM HG: CPT | Performed by: NURSE PRACTITIONER

## 2024-07-08 PROCEDURE — 99284 EMERGENCY DEPT VISIT MOD MDM: CPT

## 2024-07-08 PROCEDURE — 93971 EXTREMITY STUDY: CPT | Performed by: SURGERY

## 2024-07-08 PROCEDURE — 99213 OFFICE O/P EST LOW 20 MIN: CPT | Performed by: NURSE PRACTITIONER

## 2024-07-08 PROCEDURE — 93971 EXTREMITY STUDY: CPT

## 2024-07-08 RX ORDER — CLINDAMYCIN HYDROCHLORIDE 300 MG/1
300 CAPSULE ORAL 4 TIMES DAILY
Qty: 40 CAPSULE | Refills: 0 | Status: SHIPPED | OUTPATIENT
Start: 2024-07-08 | End: 2024-07-18

## 2024-07-08 ASSESSMENT — ENCOUNTER SYMPTOMS
RHINORRHEA: 0
BLOOD IN STOOL: 0
ABDOMINAL PAIN: 0
DIARRHEA: 0
VOMITING: 0
CONSTIPATION: 0
WHEEZING: 0
SORE THROAT: 0
NAUSEA: 0
COUGH: 0
EYE ITCHING: 0
EYE DISCHARGE: 0
SINUS PRESSURE: 0
COLOR CHANGE: 0
SHORTNESS OF BREATH: 0

## 2024-07-08 NOTE — ED PROVIDER NOTES
Subjective   History of Present Illness  Patient is a 49-year-old male who presents to the ER with complaints of right leg pain.  He states a few nights ago he thought he had a charley horse to his right calf.  He has since noted redness to the right calf.  He went to a walk-in clinic who was concerned about a DVT and felt he needed to be further evaluated.  He has no chest pain, shortness of breath, or other symptoms.  Past medical history significant for hypertension, obesity, sleep apnea        Review of Systems   Constitutional: Negative.  Negative for fever.   HENT: Negative.  Negative for congestion.    Respiratory: Negative.  Negative for cough and shortness of breath.    Cardiovascular: Negative.  Negative for chest pain.   Gastrointestinal: Negative.  Negative for abdominal pain, constipation, diarrhea, nausea and vomiting.   Genitourinary: Negative.  Negative for dysuria.   Musculoskeletal:         Positive for right calf pain   Skin:  Positive for color change.        Positive for redness to the right calf   All other systems reviewed and are negative.      Past Medical History:   Diagnosis Date    Hypertension     Obesity     Obstructive sleep apnea treated with continuous positive airway pressure (CPAP)     Pain     back and joint    Personal history of COVID-19 05/2022    Personal history of COVID-19 01/2023       Allergies   Allergen Reactions    Adhesive Tape Swelling and Rash     Latex based tape    Latex Swelling and Rash       Past Surgical History:   Procedure Laterality Date    ENDOSCOPY N/A 01/06/2023    Procedure: ESOPHAGOGASTRODUODENOSCOPY WITH ANESTHESIA;  Surgeon: Solitario Luo MD;  Location: [facility] ENDOSCOPY;  Service: General;  Laterality: N/A;  pre morbid obesity  post  miriam rodrigues aprn    GASTRIC SLEEVE LAPAROSCOPIC N/A 2/23/2023    Procedure: GASTRIC SLEEVE LAPAROSCOPIC WITH DAVINCI ROBOT;  Surgeon: Solitario Luo MD;  Location: [facility] OR;  Service: Robotics - DaVinci;   Laterality: N/A;    HIP SURGERY Left 1986       Family History   Problem Relation Age of Onset    Arthritis Mother     Hypertension Mother     Obesity Mother     Arthritis Father     Diabetes Father     Hypertension Father     Obesity Father     Arthritis Brother     Obesity Brother     Arthritis Maternal Grandmother     Hypertension Maternal Grandmother     Cancer Maternal Grandfather     Hypertension Maternal Grandfather     Arthritis Paternal Grandmother     Hypertension Paternal Grandmother     Obesity Paternal Grandmother     Hypertension Paternal Grandfather     Stroke Paternal Grandfather     Obesity Paternal Grandfather        Social History     Socioeconomic History    Marital status:    Tobacco Use    Smoking status: Never    Smokeless tobacco: Never   Vaping Use    Vaping status: Never Used   Substance and Sexual Activity    Alcohol use: Never    Drug use: Never    Sexual activity: Defer           Objective   Physical Exam  Vitals and nursing note reviewed.   Constitutional:       General: He is not in acute distress.     Appearance: He is well-developed. He is obese. He is not diaphoretic.   HENT:      Head: Atraumatic.      Right Ear: External ear normal.      Left Ear: External ear normal.      Nose: Nose normal.      Mouth/Throat:      Pharynx: Oropharynx is clear.   Eyes:      General: No scleral icterus.     Extraocular Movements: Extraocular movements intact.      Conjunctiva/sclera: Conjunctivae normal.   Neck:      Thyroid: No thyromegaly.      Vascular: No JVD.   Cardiovascular:      Rate and Rhythm: Normal rate and regular rhythm.      Heart sounds: Normal heart sounds. No murmur heard.  Pulmonary:      Effort: Pulmonary effort is normal. No respiratory distress.      Breath sounds: Normal breath sounds. No wheezing or rales.   Chest:      Chest wall: No tenderness.   Abdominal:      General: Bowel sounds are normal. There is no distension.      Palpations: Abdomen is soft. There is no  mass.      Tenderness: There is no abdominal tenderness. There is no guarding or rebound.   Musculoskeletal:         General: Normal range of motion.      Cervical back: Normal range of motion and neck supple.   Lymphadenopathy:      Cervical: No cervical adenopathy.   Skin:     General: Skin is warm and dry.      Coloration: Skin is not pale.      Findings: Erythema present. No rash.      Comments: Patient has mild pain to the posterior aspect of the right calf, no significant swelling noted, there is mild erythema noted to the posterior aspect of the right calf, patient is neurologically neurovascularly intact   Neurological:      Mental Status: He is alert and oriented to person, place, and time.      Cranial Nerves: No cranial nerve deficit.      Coordination: Coordination normal.      Deep Tendon Reflexes: Reflexes are normal and symmetric.   Psychiatric:         Mood and Affect: Mood normal.         Behavior: Behavior normal.         Thought Content: Thought content normal.         Judgment: Judgment normal.         Procedures           ED Course  ED Course as of 07/08/24 1949 Mon Jul 08, 2024 1930 Bette Shahid on 7/8/2024  7:12 PM CDT  RT Lower Extremity  No thrombus visualized at this time     [TW]      ED Course User Index  [TW] Renetta Linn APRN                                             Medical Decision Making  Patient is a 49-year-old male who presents to the ER with complaints of right leg pain.  He states a few nights ago he thought he had a charley horse to his right calf.  He has since noted redness to the right calf.  He went to a walk-in clinic who was concerned about a DVT and felt he needed to be further evaluated.  He has no chest pain, shortness of breath, or other symptoms.  Past medical history significant for hypertension, obesity, sleep apnea  Differential diagnosis: DVT, cellulitis, and other    Venous duplex Doppler ultrasound is negative for DVT.  We will treat for  cellulitis and recommend close follow-up with PCP for reevaluation.  He will be discharged home shortly in stable condition.    Problems Addressed:  Cellulitis of right lower extremity: acute illness or injury    Risk  Prescription drug management.        Final diagnoses:   Cellulitis of right lower extremity       ED Disposition  ED Disposition       ED Disposition   Discharge    Condition   Good    Comment   --               No follow-up provider specified.       Medication List        New Prescriptions      clindamycin 300 MG capsule  Commonly known as: CLEOCIN  Take 1 capsule by mouth 4 (Four) Times a Day for 10 days.               Where to Get Your Medications        These medications were sent to [facility] - [address] - [phone] [facility] [phone]   [address], [place] [address]      Phone: [phone]   clindamycin 300 MG capsule            Renetta Linn, APRN  07/08/24 1949

## 2024-07-08 NOTE — PROGRESS NOTES
Neurological:      General: No focal deficit present.      Mental Status: He is alert and oriented to person, place, and time.      Deep Tendon Reflexes: Reflexes are normal and symmetric.   Psychiatric:         Attention and Perception: Attention normal.         Mood and Affect: Mood normal.         Behavior: Behavior normal. Behavior is cooperative.         Thought Content: Thought content normal.         /74 (Site: Right Upper Arm, Position: Sitting, Cuff Size: Large Adult)   Pulse 55   Temp 97.2 °F (36.2 °C) (Temporal)   Ht 1.829 m (6')   Wt (!) 160.2 kg (353 lb 4 oz)   SpO2 99%   BMI 47.91 kg/m²     Assessment   Assessment & Plan      Diagnosis Orders   1. Varicose veins of right lower extremity with pain            Plan   Discussed with patient that DVT is suspected with symptoms and assessment. Called [facility] to try to see if they could perform Venous Doppler before closing and their regular ultrasound tech is out and the one covering is unable to perform them. Due to limitations and not having STAT results tonight, discussed with patient it would be better for him to be seen at the ER to rule out DVT. Pt verbalized understanding and will go to the ER. Denied need for ambulance transport.     Urgent Care evaluation today is not a substitute for a PCP visit. Follow up care is your responsibility to discuss and review this UC visit with your PCP.     Return if symptoms worsen or fail to improve.         Discussed usage, benefits, and side effects of any administered or prescribed medications. All questions were answered regarding evaluation, diagnosis, and treatment plan done during today's visit. Patient was strongly encouraged to follow up with PCP within the next few days to be re-evaluated for improvement in symptoms or for any other questions. Return precautions for worsening of the condition or development of new concerning symptoms discussed. Patient and/or guardian was

## 2025-04-17 ENCOUNTER — OFFICE VISIT (OUTPATIENT)
Age: 50
End: 2025-04-17

## 2025-04-17 VITALS
WEIGHT: 315 LBS | BODY MASS INDEX: 42.66 KG/M2 | DIASTOLIC BLOOD PRESSURE: 82 MMHG | HEIGHT: 72 IN | HEART RATE: 80 BPM | OXYGEN SATURATION: 98 % | TEMPERATURE: 97.3 F | SYSTOLIC BLOOD PRESSURE: 134 MMHG

## 2025-04-17 DIAGNOSIS — N47.2 PARAPHIMOSIS: ICD-10-CM

## 2025-04-17 DIAGNOSIS — Z12.5 ENCOUNTER FOR SCREENING FOR MALIGNANT NEOPLASM OF PROSTATE: ICD-10-CM

## 2025-04-17 DIAGNOSIS — I10 PRIMARY HYPERTENSION: ICD-10-CM

## 2025-04-17 DIAGNOSIS — Z12.11 ENCOUNTER FOR SCREENING FOR MALIGNANT NEOPLASM OF COLON: ICD-10-CM

## 2025-04-17 DIAGNOSIS — Z13.220 ENCOUNTER FOR SCREENING FOR LIPID DISORDER: ICD-10-CM

## 2025-04-17 DIAGNOSIS — Z13.1 SCREENING FOR DIABETES MELLITUS: ICD-10-CM

## 2025-04-17 DIAGNOSIS — M17.0 PRIMARY OSTEOARTHRITIS OF BOTH KNEES: Primary | ICD-10-CM

## 2025-04-17 DIAGNOSIS — B35.1 ONYCHOMYCOSIS: ICD-10-CM

## 2025-04-17 DIAGNOSIS — L30.9 DERMATITIS: ICD-10-CM

## 2025-04-17 DIAGNOSIS — Z98.84 BARIATRIC SURGERY STATUS: ICD-10-CM

## 2025-04-17 DIAGNOSIS — R53.83 FATIGUE, UNSPECIFIED TYPE: ICD-10-CM

## 2025-04-17 RX ORDER — CLOBETASOL PROPIONATE 0.5 MG/G
CREAM TOPICAL
Qty: 60 G | Refills: 3 | Status: SHIPPED | OUTPATIENT
Start: 2025-04-17

## 2025-04-17 RX ORDER — TERBINAFINE HYDROCHLORIDE 250 MG/1
250 TABLET ORAL DAILY
Qty: 84 TABLET | Refills: 0 | Status: SHIPPED | OUTPATIENT
Start: 2025-04-17 | End: 2025-07-10

## 2025-04-17 RX ORDER — TRAMADOL HYDROCHLORIDE 50 MG/1
50 TABLET ORAL EVERY 6 HOURS PRN
Qty: 60 TABLET | Refills: 0 | Status: SHIPPED | OUTPATIENT
Start: 2025-04-17 | End: 2025-05-17

## 2025-04-17 RX ORDER — CLOBETASOL PROPIONATE 0.5 MG/G
OINTMENT TOPICAL
Qty: 60 G | Refills: 5 | Status: SHIPPED | OUTPATIENT
Start: 2025-04-17 | End: 2025-04-17 | Stop reason: CLARIF

## 2025-04-17 RX ORDER — LISINOPRIL 10 MG/1
10 TABLET ORAL DAILY
Qty: 90 TABLET | Refills: 3 | Status: SHIPPED | OUTPATIENT
Start: 2025-04-17

## 2025-04-17 SDOH — ECONOMIC STABILITY: FOOD INSECURITY: WITHIN THE PAST 12 MONTHS, THE FOOD YOU BOUGHT JUST DIDN'T LAST AND YOU DIDN'T HAVE MONEY TO GET MORE.: NEVER TRUE

## 2025-04-17 SDOH — ECONOMIC STABILITY: FOOD INSECURITY: WITHIN THE PAST 12 MONTHS, YOU WORRIED THAT YOUR FOOD WOULD RUN OUT BEFORE YOU GOT MONEY TO BUY MORE.: NEVER TRUE

## 2025-04-17 ASSESSMENT — PATIENT HEALTH QUESTIONNAIRE - PHQ9
SUM OF ALL RESPONSES TO PHQ QUESTIONS 1-9: 0
SUM OF ALL RESPONSES TO PHQ QUESTIONS 1-9: 0
2. FEELING DOWN, DEPRESSED OR HOPELESS: NOT AT ALL
1. LITTLE INTEREST OR PLEASURE IN DOING THINGS: NOT AT ALL
SUM OF ALL RESPONSES TO PHQ QUESTIONS 1-9: 0
SUM OF ALL RESPONSES TO PHQ QUESTIONS 1-9: 0

## 2025-04-17 ASSESSMENT — ENCOUNTER SYMPTOMS
SORE THROAT: 0
VOMITING: 0
SHORTNESS OF BREATH: 0
ABDOMINAL PAIN: 0
NAUSEA: 0
CONSTIPATION: 0
COUGH: 0
DIARRHEA: 0
TROUBLE SWALLOWING: 0
RHINORRHEA: 0

## 2025-04-17 NOTE — PROGRESS NOTES
Grandfather        Social History     Tobacco Use    Smoking status: Never    Smokeless tobacco: Never   Substance Use Topics    Alcohol use: No     Alcohol/week: 0.0 standard drinks of alcohol         Review of Systems   Constitutional:  Negative for activity change, appetite change, fatigue, fever and unexpected weight change.   HENT:  Negative for ear pain, rhinorrhea, sore throat and trouble swallowing.    Eyes:  Negative for visual disturbance.   Respiratory:  Negative for cough and shortness of breath.    Cardiovascular:  Negative for chest pain, palpitations and leg swelling.   Gastrointestinal:  Negative for abdominal pain, constipation, diarrhea, nausea and vomiting.   Genitourinary:  Positive for penile pain (at glans and foreskin). Negative for flank pain.   Musculoskeletal:  Positive for arthralgias. Negative for myalgias, neck pain and neck stiffness.   Neurological:  Negative for headaches.   Psychiatric/Behavioral:  Negative for decreased concentration and sleep disturbance. The patient is not nervous/anxious.           Physical Exam  Vitals reviewed.   Constitutional:       Appearance: Normal appearance. He is obese.   HENT:      Head: Normocephalic and atraumatic.      Right Ear: Tympanic membrane, ear canal and external ear normal.      Left Ear: Tympanic membrane, ear canal and external ear normal.      Nose: Nose normal.      Mouth/Throat:      Mouth: Mucous membranes are moist.      Pharynx: Oropharynx is clear.   Eyes:      Extraocular Movements: Extraocular movements intact.      Conjunctiva/sclera: Conjunctivae normal.      Pupils: Pupils are equal, round, and reactive to light.   Cardiovascular:      Rate and Rhythm: Normal rate and regular rhythm.      Pulses: Normal pulses.      Heart sounds: Normal heart sounds. No murmur heard.     No friction rub. No gallop.   Pulmonary:      Effort: Pulmonary effort is normal.      Breath sounds: Normal breath sounds.   Abdominal:      General: There

## 2025-04-22 ENCOUNTER — RESULTS FOLLOW-UP (OUTPATIENT)
Age: 50
End: 2025-04-22

## 2025-04-22 DIAGNOSIS — Z98.84 BARIATRIC SURGERY STATUS: ICD-10-CM

## 2025-04-22 DIAGNOSIS — Z13.1 SCREENING FOR DIABETES MELLITUS: ICD-10-CM

## 2025-04-22 DIAGNOSIS — Z13.220 ENCOUNTER FOR SCREENING FOR LIPID DISORDER: ICD-10-CM

## 2025-04-22 DIAGNOSIS — R53.83 FATIGUE, UNSPECIFIED TYPE: ICD-10-CM

## 2025-04-22 DIAGNOSIS — I10 PRIMARY HYPERTENSION: ICD-10-CM

## 2025-04-22 DIAGNOSIS — Z12.5 ENCOUNTER FOR SCREENING FOR MALIGNANT NEOPLASM OF PROSTATE: ICD-10-CM

## 2025-04-22 LAB
25(OH)D3 SERPL-MCNC: 16.6 NG/ML
ALBUMIN SERPL-MCNC: 4.1 G/DL (ref 3.5–5.2)
ALP SERPL-CCNC: 80 U/L (ref 40–129)
ALT SERPL-CCNC: 7 U/L (ref 10–50)
ANION GAP SERPL CALCULATED.3IONS-SCNC: 9 MMOL/L (ref 8–16)
AST SERPL-CCNC: 10 U/L (ref 10–50)
BASOPHILS # BLD: 0.1 K/UL (ref 0–0.2)
BASOPHILS NFR BLD: 0.8 % (ref 0–1)
BILIRUB SERPL-MCNC: 0.5 MG/DL (ref 0.2–1.2)
BUN SERPL-MCNC: 18 MG/DL (ref 6–20)
CALCIUM SERPL-MCNC: 8.8 MG/DL (ref 8.6–10)
CHLORIDE SERPL-SCNC: 105 MMOL/L (ref 98–107)
CHOLEST SERPL-MCNC: 120 MG/DL (ref 0–199)
CO2 SERPL-SCNC: 27 MMOL/L (ref 22–29)
CREAT SERPL-MCNC: 1 MG/DL (ref 0.7–1.2)
CREAT UR-MCNC: 222 MG/DL (ref 39–259)
EOSINOPHIL # BLD: 0.3 K/UL (ref 0–0.6)
EOSINOPHIL NFR BLD: 4.6 % (ref 0–5)
ERYTHROCYTE [DISTWIDTH] IN BLOOD BY AUTOMATED COUNT: 13.9 % (ref 11.5–14.5)
FOLATE SERPL-MCNC: 8.7 NG/ML (ref 4.5–32.2)
GLUCOSE SERPL-MCNC: 106 MG/DL (ref 70–99)
HBA1C MFR BLD: 5.1 % (ref 4–5.6)
HCT VFR BLD AUTO: 46.7 % (ref 42–52)
HDLC SERPL-MCNC: 34 MG/DL (ref 40–60)
HGB BLD-MCNC: 14.9 G/DL (ref 14–18)
IMM GRANULOCYTES # BLD: 0 K/UL
IRON SATN MFR SERPL: 41 % (ref 20–50)
IRON SERPL-MCNC: 96 UG/DL (ref 59–158)
LDLC SERPL CALC-MCNC: 55 MG/DL
LYMPHOCYTES # BLD: 1.5 K/UL (ref 1.1–4.5)
LYMPHOCYTES NFR BLD: 20.9 % (ref 20–40)
MCH RBC QN AUTO: 27.6 PG (ref 27–31)
MCHC RBC AUTO-ENTMCNC: 31.9 G/DL (ref 33–37)
MCV RBC AUTO: 86.5 FL (ref 80–94)
MICROALBUMIN UR-MCNC: <1.2 MG/DL (ref 0–1.99)
MICROALBUMIN/CREAT UR-RTO: NORMAL MG/G (ref 0–29)
MONOCYTES # BLD: 0.6 K/UL (ref 0–0.9)
MONOCYTES NFR BLD: 8.1 % (ref 0–10)
NEUTROPHILS # BLD: 4.7 K/UL (ref 1.5–7.5)
NEUTS SEG NFR BLD: 65.5 % (ref 50–65)
PLATELET # BLD AUTO: 219 K/UL (ref 130–400)
PMV BLD AUTO: 11.4 FL (ref 9.4–12.4)
POTASSIUM SERPL-SCNC: 4.3 MMOL/L (ref 3.5–5.1)
PROT SERPL-MCNC: 6.6 G/DL (ref 6.4–8.3)
PSA SERPL-MCNC: 0.87 NG/ML (ref 0–4)
RBC # BLD AUTO: 5.4 M/UL (ref 4.7–6.1)
SODIUM SERPL-SCNC: 141 MMOL/L (ref 136–145)
T4 FREE SERPL-MCNC: 1.2 NG/DL (ref 0.93–1.7)
TIBC SERPL-MCNC: 234 UG/DL (ref 250–400)
TRIGL SERPL-MCNC: 153 MG/DL (ref 0–149)
TSH SERPL DL<=0.005 MIU/L-ACNC: 1.01 UIU/ML (ref 0.27–4.2)
VIT B12 SERPL-MCNC: <150 PG/ML (ref 232–1245)
WBC # BLD AUTO: 7.2 K/UL (ref 4.8–10.8)

## 2025-04-25 ENCOUNTER — NUTRITION (OUTPATIENT)
Dept: BARIATRICS/WEIGHT MGMT | Facility: CLINIC | Age: 50
End: 2025-04-25
Payer: COMMERCIAL

## 2025-04-25 ENCOUNTER — OFFICE VISIT (OUTPATIENT)
Dept: BARIATRICS/WEIGHT MGMT | Facility: CLINIC | Age: 50
End: 2025-04-25
Payer: COMMERCIAL

## 2025-04-25 VITALS
TEMPERATURE: 98.4 F | OXYGEN SATURATION: 99 % | DIASTOLIC BLOOD PRESSURE: 85 MMHG | SYSTOLIC BLOOD PRESSURE: 126 MMHG | HEART RATE: 60 BPM | WEIGHT: 315 LBS | HEIGHT: 72 IN | BODY MASS INDEX: 42.66 KG/M2

## 2025-04-25 DIAGNOSIS — Z98.84 STATUS POST LAPAROSCOPIC SLEEVE GASTRECTOMY: Primary | ICD-10-CM

## 2025-04-25 DIAGNOSIS — I10 PRIMARY HYPERTENSION: ICD-10-CM

## 2025-04-25 DIAGNOSIS — E66.813 CLASS 3 SEVERE OBESITY DUE TO EXCESS CALORIES WITH SERIOUS COMORBIDITY AND BODY MASS INDEX (BMI) OF 50.0 TO 59.9 IN ADULT: ICD-10-CM

## 2025-04-25 DIAGNOSIS — E66.01 CLASS 3 SEVERE OBESITY DUE TO EXCESS CALORIES WITH SERIOUS COMORBIDITY AND BODY MASS INDEX (BMI) OF 50.0 TO 59.9 IN ADULT: ICD-10-CM

## 2025-04-25 LAB — VIT B6 SERPL-MCNC: 11.7 NMOL/L (ref 20–125)

## 2025-04-25 RX ORDER — TERBINAFINE HYDROCHLORIDE 250 MG/1
250 TABLET ORAL DAILY
COMMUNITY
Start: 2025-04-17 | End: 2025-07-11

## 2025-04-25 RX ORDER — CLOBETASOL PROPIONATE 0.5 MG/G
1 OINTMENT TOPICAL 2 TIMES DAILY
COMMUNITY
Start: 2025-04-17

## 2025-04-25 RX ORDER — ERGOCALCIFEROL 1.25 MG/1
50000 CAPSULE, LIQUID FILLED ORAL WEEKLY
Qty: 12 CAPSULE | Refills: 1 | Status: SHIPPED | OUTPATIENT
Start: 2025-04-25

## 2025-04-25 RX ORDER — LISINOPRIL 10 MG/1
10 TABLET ORAL DAILY
COMMUNITY
Start: 2025-04-17

## 2025-04-25 RX ORDER — TRAMADOL HYDROCHLORIDE 50 MG/1
50 TABLET ORAL
COMMUNITY
Start: 2025-04-17 | End: 2025-05-18

## 2025-04-25 NOTE — PROGRESS NOTES
"Metabolic and Bariatric Surgery Adult Nutrition Assessment    Patient Name: Santiago Mcgraw   YOB: 1975   MRN: [id number]     Assessment Date:  04/25/2025     Reason for Visit: Post-Surgical New Patient Nutrition Education Class    Treatment Pathway: Postoperative Gastric Sleeve Gastrectomy 2+ yrs    Assessment    Anthropometrics   Wt Readings from Last 1 Encounters:   04/25/25 (!) 171 kg (377 lb 12.8 oz)     Ht Readings from Last 1 Encounters:   04/25/25 182.9 cm (72\")     BMI Readings from Last 1 Encounters:   04/25/25 51.24 kg/m²      Surgery Date: Feb 23, 2023      Past Medical History:   Diagnosis Date    Hypertension     Obesity     Obstructive sleep apnea treated with continuous positive airway pressure (CPAP)     Pain     back and joint    Personal history of COVID-19 05/2022    Personal history of COVID-19 01/2023      Past Surgical History:   Procedure Laterality Date    ENDOSCOPY N/A 01/06/2023    Procedure: ESOPHAGOGASTRODUODENOSCOPY WITH ANESTHESIA;  Surgeon: Solitario Luo MD;  Location: [facility] ENDOSCOPY;  Service: General;  Laterality: N/A;  pre morbid obesity  post  miriam ritter    GASTRIC SLEEVE LAPAROSCOPIC N/A 2/23/2023    Procedure: GASTRIC SLEEVE LAPAROSCOPIC WITH DAVINCI ROBOT;  Surgeon: Solitario Luo MD;  Location: [facility] OR;  Service: Robotics - DaVinci;  Laterality: N/A;    HIP SURGERY Left 1986      Current Outpatient Medications   Medication Sig Dispense Refill    buPROPion XL (WELLBUTRIN XL) 150 MG 24 hr tablet Take 1 tablet by mouth Every Morning. (Patient not taking: Reported on 8/22/2023)      CALCIUM-VITAMIN D PO Take  by mouth. (Patient not taking: Reported on 4/25/2025)      clobetasol (TEMOVATE) 0.05 % ointment Apply 1 Application topically to the appropriate area as directed 2 (Two) Times a Day. APPLY TO AFFECTED AREA      Cyanocobalamin (VITAMIN B-12 PO) Take  by mouth. (Patient not taking: Reported on 4/25/2025)      ferrous gluconate (FERGON) " 324 MG tablet Take 1 tablet by mouth Daily With Breakfast. (Patient not taking: Reported on 4/25/2025) 30 tablet 0    lisinopril (PRINIVIL,ZESTRIL) 10 MG tablet Take 1 tablet by mouth Daily.      multivitamin (MULTIVITAMIN PO) Take  by mouth Daily. (Patient not taking: Reported on 4/25/2025)      terbinafine (lamiSIL) 250 MG tablet Take 1 tablet by mouth Daily.      traMADol (ULTRAM) 50 MG tablet Take 1 tablet by mouth.      vitamin D (ERGOCALCIFEROL) 1.25 MG (40627 UT) capsule capsule Take 1 capsule by mouth 1 (One) Time Per Week. 12 capsule 1     Current Facility-Administered Medications   Medication Dose Route Frequency Provider Last Rate Last Admin    thiamine (B-1) injection 100 mg  100 mg Intramuscular Daily Solitario Luo MD          Allergies   Allergen Reactions    Adhesive Tape Swelling and Rash     Latex based tape    Latex Swelling and Rash          Nutrition Intervention  Nutrition education and nutrition coaching for behavior change provided.  Strategies used included Comprehensive education & skill development for measuring portions, reading food labels, calculating protein, meal planning, understanding appropriate drink choices, and evaluating condiments, seasonings, and cooking methods.   Review of medical weight loss prescription plan and reviewed nutritional needs for Postoperative Gastric Sleeve Gastrectomy 2+ yrs .   Discussed importance ofo daily bariatric or multi-vitamin daily, B12, Calcium Citrate with Vitamin D, and meal plan for promoting adequate nutrition:  Eat 4 meals per day at 1 c portions, , Half of all meals should be servings of vegetables., Protein goal: minimum 80 gms., 1 protein shake daily (discussed guidelines of compliant shakes), Eliminate snacks., Reduce fat, sugar, and/or salt in food choices., Choose more nutrient dense foods., Choose foods with increased fiber., Monitor portion sizes using measuring cups., Eliminate soda and sugar-sweetened beverages, and Increase  fluid intake to 64 ounces per day  Self-monitoring strategies such as keeping a food journal (on paper or electronically) were discussed.  Recommended increasing physical activity, beyond normal daily habits, gradually working to reach ~30 minutes daily.     Monitoring/Evaluation Plan  Anticipate follow up in 2 weeks. Continue collaboration of care with physician and treatment team.     Time Spent 30 minutes    Electronically signed by  Catalina Proctor RDN, LD  04/25/2025 14:23 CDT.

## 2025-04-25 NOTE — PROGRESS NOTES
"Patient Care Team:  Angelo Mckinnon APRN as PCP - General (Family Medicine)  Francie Cabrera APRN as Nurse Practitioner (Nurse Practitioner)    Chief Complaint: S/P Sleeve Gastrectomy, 2/2023    Subjective     History of Present Illness  The patient is status post sleeve gastrectomy in 2023, here today returning to get back on track.    He has been experiencing significant stress due to a recent job change, which has led to an increase in his responsibilities and subsequent weight gain. He acknowledges that he has not been adhering to portion control in his diet. His current role involves managing a restaurant, where he frequently indulges in snacks such as homemade chips. Despite these challenges, he has made some positive changes, including eliminating Coke from his diet and reducing his intake of lemonade with sugar. He also reports a decrease in his overall food consumption compared to his pre-surgery habits.      Objective     Vital Signs  /85 (BP Location: Right arm, Patient Position: Sitting, Cuff Size: Adult)   Pulse 60   Temp 98.4 °F (36.9 °C) (Temporal)   Ht 182.9 cm (72\")   Wt (!) 171 kg (377 lb 12.8 oz)   SpO2 99%   BMI 51.24 kg/m²          Physical Exam:  Physical Exam  Vitals reviewed.   Constitutional:       Appearance: He is obese.   Cardiovascular:      Rate and Rhythm: Normal rate and regular rhythm.   Pulmonary:      Effort: Pulmonary effort is normal.   Musculoskeletal:         General: Normal range of motion.   Skin:     General: Skin is warm and dry.   Neurological:      Mental Status: He is alert and oriented to person, place, and time.   Psychiatric:         Mood and Affect: Mood normal.         Behavior: Behavior normal.          Physical Exam       Results Review:    Labs reviewed    Results  Laboratory Studies  B12 levels are low. Vitamin D levels are low.      Medication Reviewed:   Current Outpatient Medications   Medication Sig Dispense Refill    clobetasol " (TEMOVATE) 0.05 % ointment Apply 1 Application topically to the appropriate area as directed 2 (Two) Times a Day. APPLY TO AFFECTED AREA      lisinopril (PRINIVIL,ZESTRIL) 10 MG tablet Take 1 tablet by mouth Daily.      terbinafine (lamiSIL) 250 MG tablet Take 1 tablet by mouth Daily.      traMADol (ULTRAM) 50 MG tablet Take 1 tablet by mouth.      buPROPion XL (WELLBUTRIN XL) 150 MG 24 hr tablet Take 1 tablet by mouth Every Morning. (Patient not taking: Reported on 8/22/2023)      CALCIUM-VITAMIN D PO Take  by mouth. (Patient not taking: Reported on 4/25/2025)      Cyanocobalamin (VITAMIN B-12 PO) Take  by mouth. (Patient not taking: Reported on 4/25/2025)      ferrous gluconate (FERGON) 324 MG tablet Take 1 tablet by mouth Daily With Breakfast. (Patient not taking: Reported on 4/25/2025) 30 tablet 0    multivitamin (MULTIVITAMIN PO) Take  by mouth Daily. (Patient not taking: Reported on 4/25/2025)      vitamin D (ERGOCALCIFEROL) 1.25 MG (85375 UT) capsule capsule Take 1 capsule by mouth 1 (One) Time Per Week. 12 capsule 1     Current Facility-Administered Medications   Medication Dose Route Frequency Provider Last Rate Last Admin    thiamine (B-1) injection 100 mg  100 mg Intramuscular Daily Solitario Luo MD           Assessment & Plan      Assessment & Plan  1. Post-sleeve gastrectomy status.  His BMI is currently at 51. He was advised to increase his meal portion to one cup, four times daily, supplemented with a shake. He was also encouraged to increase his protein and fiber intake. The importance of planning meals in advance was emphasized. He was commended for his progress in reducing sugar intake and was advised to continue this effort. A follow-up consultation with Catalina was scheduled for two weeks, either via video call or in person.    2. Vitamin B12 deficiency.  He reported feeling unwell consistently. He was advised to resume his multivitamin regimen. A prescription for vitamin B12 injections will be  sent to [facility].    3. Vitamin D deficiency.  A prescription for vitamin D, to be taken once weekly for 12 weeks, will be provided. Upon completion of this course, he will transition to a daily vitamin D3 supplement.    Follow-up  The patient will follow up in 1 month.    PROCEDURE  The patient underwent a sleeve gastrectomy in 2023.    POD  23  from Sleeve Gastrectomy.  Diagnoses and all orders for this visit:    1. Status post laparoscopic sleeve gastrectomy (Primary)    2. Class 3 severe obesity due to excess calories with serious comorbidity and body mass index (BMI) of 50.0 to 59.9 in adult  Assessment & Plan:  Patient's (Body mass index is 51.24 kg/m².) indicates that they are morbidly/severely obese (BMI > 40 or > 35 with obesity - related health condition) with health conditions that include hypertension . Weight is worsening.  BMI  is above average; BMI management plan is completed. We discussed portion control and increasing exercise.       3. Primary hypertension    Other orders  -     vitamin D (ERGOCALCIFEROL) 1.25 MG (86361 UT) capsule capsule; Take 1 capsule by mouth 1 (One) Time Per Week.  Dispense: 12 capsule; Refill: 1           Santiago Mcgraw and I discussed the importance of changing behavior for consistent and successful weight loss. We first reviewed again the definition of a meal which is defined as portion sizes less than a half a cup and those portions incorporating a protein. Specifically the protein should fill half of that volume. I also explained that he should attempt to consume 4 meals as defined above daily and to avoid snacking or grazing. He should also be mindful of adequate hydration by consuming at least 64 oz of water daily and incorporation of a regular exercise regimen.     I discussed the importance of taking his multivitamins as directed.  He is to return to our office 1 month or as needed.        Francie Cabrera, HILDA  05/01/25  11:39 CDT    A total of 30 minutes was  spent face to face with this patient and over half of the time was spent on counseling and coordination of care for the disease of obesity. We specifically reviewed the dietary prescription and I made recommendations toward increasing exercise as tolerated as well as focusing on training their behavior toward storing less.  Patient or patient representative verbalized consent for the use of Ambient Listening during the visit with  HILDA Posada for chart documentation. 5/1/2025  11:08 CDT

## 2025-04-26 LAB — VIT B1 SERPL-MCNC: 6 NMOL/L (ref 4–15)

## 2025-04-27 LAB
A-TOCOPHEROL VIT E SERPL-MCNC: 9.5 MG/L (ref 5.5–18)
BETA+GAMMA TOCOPHEROL SERPL-MCNC: 1.1 MG/L (ref 0–6)
VIT C SERPL-MCNC: 56 UMOL/L (ref 23–114)

## 2025-05-01 NOTE — ASSESSMENT & PLAN NOTE
Patient's (Body mass index is 51.24 kg/m².) indicates that they are morbidly/severely obese (BMI > 40 or > 35 with obesity - related health condition) with health conditions that include hypertension . Weight is worsening.  BMI  is above average; BMI management plan is completed. We discussed portion control and increasing exercise.

## 2025-05-12 ENCOUNTER — TELEMEDICINE (OUTPATIENT)
Dept: BARIATRICS/WEIGHT MGMT | Facility: CLINIC | Age: 50
End: 2025-05-12
Payer: COMMERCIAL

## 2025-05-12 DIAGNOSIS — E66.813 CLASS 3 SEVERE OBESITY DUE TO EXCESS CALORIES WITH SERIOUS COMORBIDITY AND BODY MASS INDEX (BMI) OF 50.0 TO 59.9 IN ADULT: Primary | ICD-10-CM

## 2025-05-12 DIAGNOSIS — E66.01 CLASS 3 SEVERE OBESITY DUE TO EXCESS CALORIES WITH SERIOUS COMORBIDITY AND BODY MASS INDEX (BMI) OF 50.0 TO 59.9 IN ADULT: Primary | ICD-10-CM

## 2025-05-12 DIAGNOSIS — Z98.84 STATUS POST LAPAROSCOPIC SLEEVE GASTRECTOMY: ICD-10-CM

## 2025-05-12 PROCEDURE — 97802 MEDICAL NUTRITION INDIV IN: CPT

## 2025-05-12 NOTE — PROGRESS NOTES
"Metabolic and Bariatric Surgery Adult Nutrition Assessment    Patient Name: Santiago Mcgraw   YOB: 1975   MRN: [id number]     Assessment Date:  05/12/2025     You have chosen to receive care through a telehealth visit.  Do you consent to use a video/audio connection for your medical care today? Yes   Patient Location: in [place] - at home.   Provider Location: [facility]    Reason for Visit: Follow-up Nutrition Assessment     Treatment Pathway: Postoperative Gastric Sleeve Gastrectomy 2+ yrs  Surgical date Feb 23, 2023  Assessment    Anthropometrics   Wt Readings from Last 1 Encounters:   04/25/25 (!) 171 kg (377 lb 12.8 oz)     Ht Readings from Last 1 Encounters:   04/25/25 182.9 cm (72\")     BMI Readings from Last 1 Encounters:   04/25/25 51.24 kg/m²        Past Medical History:   Diagnosis Date    Hypertension     Obesity     Obstructive sleep apnea treated with continuous positive airway pressure (CPAP)     Pain     back and joint    Personal history of COVID-19 05/2022    Personal history of COVID-19 01/2023      Past Surgical History:   Procedure Laterality Date    ENDOSCOPY N/A 01/06/2023    Procedure: ESOPHAGOGASTRODUODENOSCOPY WITH ANESTHESIA;  Surgeon: Solitario Luo MD;  Location: [facility] ENDOSCOPY;  Service: General;  Laterality: N/A;  pre morbid obesity  post  miriam ritter    GASTRIC SLEEVE LAPAROSCOPIC N/A 2/23/2023    Procedure: GASTRIC SLEEVE LAPAROSCOPIC WITH DAVINCI ROBOT;  Surgeon: Solitario Luo MD;  Location: [facility] OR;  Service: Robotics - DaVinci;  Laterality: N/A;    HIP SURGERY Left 1986      Current Outpatient Medications   Medication Sig Dispense Refill    buPROPion XL (WELLBUTRIN XL) 150 MG 24 hr tablet Take 1 tablet by mouth Every Morning. (Patient not taking: Reported on 8/22/2023)      CALCIUM-VITAMIN D PO Take  by mouth. (Patient not taking: Reported on 4/25/2025)      clobetasol (TEMOVATE) 0.05 % ointment Apply 1 Application topically to the " appropriate area as directed 2 (Two) Times a Day. APPLY TO AFFECTED AREA      Cyanocobalamin (VITAMIN B-12 PO) Take  by mouth. (Patient not taking: Reported on 4/25/2025)      ferrous gluconate (FERGON) 324 MG tablet Take 1 tablet by mouth Daily With Breakfast. (Patient not taking: Reported on 4/25/2025) 30 tablet 0    lisinopril (PRINIVIL,ZESTRIL) 10 MG tablet Take 1 tablet by mouth Daily.      multivitamin (MULTIVITAMIN PO) Take  by mouth Daily. (Patient not taking: Reported on 4/25/2025)      terbinafine (lamiSIL) 250 MG tablet Take 1 tablet by mouth Daily.      traMADol (ULTRAM) 50 MG tablet Take 1 tablet by mouth.      vitamin D (ERGOCALCIFEROL) 1.25 MG (33342 UT) capsule capsule Take 1 capsule by mouth 1 (One) Time Per Week. 12 capsule 1     Current Facility-Administered Medications   Medication Dose Route Frequency Provider Last Rate Last Admin    thiamine (B-1) injection 100 mg  100 mg Intramuscular Daily Solitario Luo MD          Allergies   Allergen Reactions    Adhesive Tape Swelling and Rash     Latex based tape    Latex Swelling and Rash          Motivation for weight loss includes: Wants to be able to keep up with children. Needs knee replacement surgery but would like to have more weight loss     Pertinent Social/Behavior/Environmental History:  at [employer]. Family is very busy with track season and son about to graduate. Has 2 boys     Nutrition Recall  Eating 3 meals daily   (M1) scrambled egg with turkey sausage link and cheddar cheese.   (M2) does have salad bar available at work but hasn't always made that choice.   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   Calculating Protein- n/a  Drinking sugary/carbonated beverages- weaning off sugary lemonade. Was drinking plain OJ  too- weaned off in the last 2 weeks.   Fluid Intake- Has started drinking more plain water- at least 5 bottles/day then additionally water that is flavored.     Success this Month: remains off soda (including diet sodas). Over the past 2 weeks has increased water intake and decreased sugar in beverages.    Nutrition Intervention  Nutrition education for morbid obesity s/p sleeve gastrectomy. Nutrition coaching and intensive behavioral therapy for behavior change provided.  Strategies used included Comprehensive education, Motivational Interviewing , and Problem Solving  Review of medical weight loss prescription 4 meal/day plan and reviewed nutritional needs for Postoperative Gastric Sleeve Gastrectomy 2+ yrs .  Self-monitoring strategies such as keeping a food journal (on paper or electronically) and calculating fluid/protein intake were discussed.  Recommend increasing physical activity, beyond normal daily habits, gradually working to reach ~30 minutes daily.     Recommended Diet Changes- Eat 4 meals per day at 1 c portions, , Half of all meals should be servings of vegetables., Protein goal: 80+ gms., 1 protein shake daily (discussed guidelines of compliant shakes), Eliminate snacks., Reduce fat, sugar, and/or salt in food choices., Choose more nutrient dense foods., Choose foods with increased fiber., Monitor portion sizes using measuring cups., Eliminate soda and sugar-sweetened beverages, and Increase fluid intake to 64 ounces per day     Goals  1. Prepackage meal 2 each night when making meal 3.   2. Measure portions objectively.   3. Resume food journaling and track how many days in a row he completes that behavior.    Monitoring/Evaluation Plan  Anticipate follow in 1 months. Continue collaboration of care with physician and treatment team.     Time Spent 20 minutes    Electronically signed by  Catalina Proctor RDN, LD  05/12/2025 14:03 CDT.

## 2025-06-10 ENCOUNTER — TELEMEDICINE (OUTPATIENT)
Dept: BARIATRICS/WEIGHT MGMT | Facility: CLINIC | Age: 50
End: 2025-06-10
Payer: COMMERCIAL

## 2025-06-10 DIAGNOSIS — I10 PRIMARY HYPERTENSION: ICD-10-CM

## 2025-06-10 DIAGNOSIS — Z98.84 STATUS POST LAPAROSCOPIC SLEEVE GASTRECTOMY: Primary | ICD-10-CM

## 2025-06-10 DIAGNOSIS — G47.33 OBSTRUCTIVE SLEEP APNEA SYNDROME: ICD-10-CM

## 2025-06-10 NOTE — PROGRESS NOTES
Patient Care Team:  Angelo Mckinnon APRN as PCP - General (Family Medicine)  Francie Cabrera APRN as Nurse Practitioner (Nurse Practitioner)    Reason for Visit:  Surgical Weight loss    You have chosen to receive care through a telehealth visit.  Do you consent to use a video/audio connection for your medical care today? Yes    Type of Visit: Video Visit  Location of Patient: Work     Location of Provider: [facility]   Santiago Mcgraw is a 50 y.o. male.     Santiago is here for follow-up and continued medical management of his morbid obesity.  He is currently on a prescription diet.  Santiago previously was to apply dietary changes such as following the meal plan as directed.  Patient admits to eating around 4 meals per day with the exception of vacation.  She admits to drinking at least 64 ounces or more of fluid each day and intaking 80 grams of protein..  She is currently exercising by hiking recently on vacation..  She states that she has gone without soda intake and denies  nicotine use.  Patient has lost pounds since her last appointment with us.     His goal is to get to 296 lbs so he can have a knee replacement.        The following portions of the patient's history were reviewed and updated as appropriate: allergies, current medications, past family history, past medical history, past social history, past surgical history, and problem list.    Objective   There were no vitals taken for this visit.  There were no vitals filed for this visit.    Physical Exam  Neurological:      Mental Status: He is alert.   Psychiatric:         Behavior: Behavior normal.     Patient states his weight is down 10 lbs from previous appointment.     Assessment & Plan     Encounter Diagnoses   Name Primary?    Status post laparoscopic sleeve gastrectomy Yes    Primary hypertension     Obstructive sleep apnea syndrome        Santiago Mcgraw was seen today for follow-up, obesity, nutrition  counseling and weight loss.    Today we discussed healthy changes in lifestyle, diet, and exercise. Dietician consultation obtained.  Santiago Mcgraw had received handouts to him explaining the recommendation on portion sizes/appetite control/reading nutrition labels.   Intensive behavioral therapy for obesity was done today as well.     Goals for this month are:   Continue with measuring portions and increasing back to 4 meals per day   Reviewed bloodwork- patient had it done at [facility] and his PCP is managing them. He has begun B12 shots and weekly Vit D. Advised to continue this and continue multivitamin. He had quit it and has restarted.   Bring a food journal into next appointment.     Follow up in 1 month for a weight recheck.

## 2025-08-01 ENCOUNTER — PATIENT MESSAGE (OUTPATIENT)
Age: 50
End: 2025-08-01

## 2025-08-01 DIAGNOSIS — G47.33 OSA (OBSTRUCTIVE SLEEP APNEA): ICD-10-CM

## 2025-08-01 DIAGNOSIS — E66.813 CLASS 3 SEVERE OBESITY DUE TO EXCESS CALORIES WITHOUT SERIOUS COMORBIDITY WITH BODY MASS INDEX (BMI) OF 50.0 TO 59.9 IN ADULT (HCC): ICD-10-CM

## 2025-08-05 DIAGNOSIS — E66.813 CLASS 3 SEVERE OBESITY DUE TO EXCESS CALORIES WITHOUT SERIOUS COMORBIDITY WITH BODY MASS INDEX (BMI) OF 50.0 TO 59.9 IN ADULT (HCC): Primary | ICD-10-CM

## 2025-08-25 ENCOUNTER — OFFICE VISIT (OUTPATIENT)
Dept: UROLOGY | Facility: CLINIC | Age: 50
End: 2025-08-25
Payer: COMMERCIAL

## 2025-08-25 VITALS — HEIGHT: 72 IN | BODY MASS INDEX: 42.66 KG/M2 | TEMPERATURE: 97.6 F | WEIGHT: 315 LBS

## 2025-08-25 DIAGNOSIS — N48.83 ACQUIRED BURIED PENIS: Primary | ICD-10-CM

## 2025-08-25 LAB
BILIRUB BLD-MCNC: NEGATIVE MG/DL
CLARITY, POC: CLEAR
COLOR UR: YELLOW
GLUCOSE UR STRIP-MCNC: NEGATIVE MG/DL
KETONES UR QL: NEGATIVE
LEUKOCYTE EST, POC: ABNORMAL
NITRITE UR-MCNC: NEGATIVE MG/ML
PH UR: 8.5 [PH] (ref 5–8)
PROT UR STRIP-MCNC: ABNORMAL MG/DL
RBC # UR STRIP: NEGATIVE /UL
SP GR UR: 1.01 (ref 1–1.03)
UROBILINOGEN UR QL: NORMAL

## 2025-08-25 PROCEDURE — 81003 URINALYSIS AUTO W/O SCOPE: CPT | Performed by: UROLOGY

## 2025-08-25 PROCEDURE — 99202 OFFICE O/P NEW SF 15 MIN: CPT | Performed by: UROLOGY

## 2025-08-25 RX ORDER — SEMAGLUTIDE 0.25 MG/.5ML
0.25 INJECTION, SOLUTION SUBCUTANEOUS WEEKLY
COMMUNITY
Start: 2025-08-13

## (undated) DEVICE — FRCP BIOP ENDO CAPTURAPRO SPK SERR 2.8MM 230CM

## (undated) DEVICE — SYR LL TP 10ML STRL

## (undated) DEVICE — GLV SURG SENSICARE W/ALOE PF LF 8 STRL

## (undated) DEVICE — SENSR O2 OXIMAX FNGR A/ 18IN NONSTR

## (undated) DEVICE — NDL HYPO PRECISIONGLIDE REG 22G 1 1/2

## (undated) DEVICE — BNDR ABD 4PANEL 12IN 63 TO 74IN

## (undated) DEVICE — BLADELESS OBTURATOR: Brand: WECK VISTA

## (undated) DEVICE — SUT VIC 3/0 SH 36IN VCP527H

## (undated) DEVICE — ST TBG AIRSEAL FLTR TRI LUM

## (undated) DEVICE — Device: Brand: DEFENDO AIR/WATER/SUCTION AND BIOPSY VALVE

## (undated) DEVICE — BAPTIST TURNOVER KIT: Brand: MEDLINE INDUSTRIES, INC.

## (undated) DEVICE — TROC ANCHORPORT BLADELES LP 8X120MM

## (undated) DEVICE — STAPLER 60: Brand: SUREFORM

## (undated) DEVICE — REDUCER: Brand: ENDOWRIST

## (undated) DEVICE — CANNULA SEAL

## (undated) DEVICE — FRCP BX RADJAW4 NDL 2.8 240 STD OG

## (undated) DEVICE — DRN PENRS RO SILCNE 1/4X12IN LF STRL

## (undated) DEVICE — SUT VIC 0 SUTUPAK TIES 18IN J906G

## (undated) DEVICE — CONMED SCOPE SAVER BITE BLOCK, 20X27 MM: Brand: SCOPE SAVER

## (undated) DEVICE — ARM DRAPE

## (undated) DEVICE — SEAL

## (undated) DEVICE — ENDOGATOR AUXILIARY WATER JET CONNECTOR: Brand: ENDOGATOR

## (undated) DEVICE — SYS CLOSE PORTII CARTR/THOMASN XL

## (undated) DEVICE — DAVINCI: Brand: MEDLINE INDUSTRIES, INC.

## (undated) DEVICE — THE CHANNEL CLEANING BRUSH IS A NYLON FLEXI BRUSH ATTACHED TO A FLEXIBLE PLASTIC SHEATH DESIGNED TO SAFELY REMOVE DEBRIS FROM FLEXIBLE ENDOSCOPES.

## (undated) DEVICE — KT CLN CLEANOR SCPE

## (undated) DEVICE — CUFF,BP,DISP,1 TUBE,ADULT,HP: Brand: MEDLINE

## (undated) DEVICE — VESSEL SEALER EXTEND: Brand: ENDOWRIST

## (undated) DEVICE — SUT MNCRYL 4/0 PS2 27IN UD MCP426H

## (undated) DEVICE — ENDOPATH XCEL WITH OPTIVIEW TECHNOLOGY BLADELESS TROCARS WITH STABILITY SLEEVES: Brand: ENDOPATH XCEL OPTIVIEW

## (undated) DEVICE — VISIGI 3D®  CALIBRATION SYSTEM  SIZE 40FR STD W/ BULB: Brand: BOEHRINGER® VISIGI 3D™ SLEEVE GASTRECTOMY CALIBRATION SYSTEM, SIZE 40FR W/BULB

## (undated) DEVICE — MAT PREVALON MOBL TRANSFR AIR W/PAD REPROC 39X81IN

## (undated) DEVICE — BANDAGE,GAUZE,BULKEE II,4.5"X4.1YD,STRL: Brand: MEDLINE